# Patient Record
Sex: FEMALE | Race: WHITE | NOT HISPANIC OR LATINO | Employment: OTHER | URBAN - METROPOLITAN AREA
[De-identification: names, ages, dates, MRNs, and addresses within clinical notes are randomized per-mention and may not be internally consistent; named-entity substitution may affect disease eponyms.]

---

## 2021-01-01 ENCOUNTER — APPOINTMENT (OUTPATIENT)
Dept: GENERAL RADIOLOGY | Facility: CLINIC | Age: 76
DRG: 207 | End: 2021-01-01
Attending: INTERNAL MEDICINE
Payer: COMMERCIAL

## 2021-01-01 ENCOUNTER — APPOINTMENT (OUTPATIENT)
Dept: CT IMAGING | Facility: CLINIC | Age: 76
DRG: 207 | End: 2021-01-01
Attending: HOSPITALIST
Payer: COMMERCIAL

## 2021-01-01 ENCOUNTER — HOSPITAL ENCOUNTER (INPATIENT)
Facility: CLINIC | Age: 76
LOS: 20 days | DRG: 207 | End: 2021-12-28
Attending: EMERGENCY MEDICINE | Admitting: INTERNAL MEDICINE
Payer: COMMERCIAL

## 2021-01-01 ENCOUNTER — ANESTHESIA EVENT (OUTPATIENT)
Dept: INTENSIVE CARE | Facility: CLINIC | Age: 76
DRG: 207 | End: 2021-01-01
Payer: COMMERCIAL

## 2021-01-01 ENCOUNTER — ANESTHESIA (OUTPATIENT)
Dept: INTENSIVE CARE | Facility: CLINIC | Age: 76
DRG: 207 | End: 2021-01-01
Payer: COMMERCIAL

## 2021-01-01 ENCOUNTER — DOCUMENTATION ONLY (OUTPATIENT)
Dept: OTHER | Facility: CLINIC | Age: 76
End: 2021-01-01
Payer: MEDICARE

## 2021-01-01 ENCOUNTER — APPOINTMENT (OUTPATIENT)
Dept: CT IMAGING | Facility: CLINIC | Age: 76
DRG: 207 | End: 2021-01-01
Attending: EMERGENCY MEDICINE
Payer: COMMERCIAL

## 2021-01-01 VITALS
DIASTOLIC BLOOD PRESSURE: 61 MMHG | WEIGHT: 186.29 LBS | TEMPERATURE: 99.8 F | HEIGHT: 61 IN | BODY MASS INDEX: 35.17 KG/M2 | SYSTOLIC BLOOD PRESSURE: 131 MMHG | OXYGEN SATURATION: 91 %

## 2021-01-01 DIAGNOSIS — J12.82 PNEUMONIA DUE TO 2019 NOVEL CORONAVIRUS: ICD-10-CM

## 2021-01-01 DIAGNOSIS — U07.1 PNEUMONIA DUE TO 2019 NOVEL CORONAVIRUS: ICD-10-CM

## 2021-01-01 DIAGNOSIS — J96.01 ACUTE RESPIRATORY FAILURE WITH HYPOXIA (H): ICD-10-CM

## 2021-01-01 LAB
ABO/RH(D): NORMAL
ALBUMIN SERPL-MCNC: 1.4 G/DL (ref 3.4–5)
ALBUMIN SERPL-MCNC: 1.5 G/DL (ref 3.4–5)
ALBUMIN SERPL-MCNC: 1.5 G/DL (ref 3.4–5)
ALBUMIN SERPL-MCNC: 1.7 G/DL (ref 3.4–5)
ALBUMIN SERPL-MCNC: 2.2 G/DL (ref 3.4–5)
ALBUMIN SERPL-MCNC: 3 G/DL (ref 3.4–5)
ALBUMIN UR-MCNC: 70 MG/DL
ALBUMIN UR-MCNC: 70 MG/DL
ALP SERPL-CCNC: 50 U/L (ref 40–150)
ALP SERPL-CCNC: 50 U/L (ref 40–150)
ALP SERPL-CCNC: 53 U/L (ref 40–150)
ALP SERPL-CCNC: 55 U/L (ref 40–150)
ALP SERPL-CCNC: 58 U/L (ref 40–150)
ALP SERPL-CCNC: 63 U/L (ref 40–150)
ALT SERPL W P-5'-P-CCNC: 13 U/L (ref 0–50)
ALT SERPL W P-5'-P-CCNC: 15 U/L (ref 0–50)
ALT SERPL W P-5'-P-CCNC: 20 U/L (ref 0–50)
ALT SERPL W P-5'-P-CCNC: 21 U/L (ref 0–50)
ALT SERPL W P-5'-P-CCNC: 22 U/L (ref 0–50)
ALT SERPL W P-5'-P-CCNC: 24 U/L (ref 0–50)
ANION GAP SERPL CALCULATED.3IONS-SCNC: 11 MMOL/L (ref 3–14)
ANION GAP SERPL CALCULATED.3IONS-SCNC: 4 MMOL/L (ref 3–14)
ANION GAP SERPL CALCULATED.3IONS-SCNC: 4 MMOL/L (ref 3–14)
ANION GAP SERPL CALCULATED.3IONS-SCNC: 5 MMOL/L (ref 3–14)
ANION GAP SERPL CALCULATED.3IONS-SCNC: 6 MMOL/L (ref 3–14)
ANION GAP SERPL CALCULATED.3IONS-SCNC: 7 MMOL/L (ref 3–14)
ANION GAP SERPL CALCULATED.3IONS-SCNC: 8 MMOL/L (ref 3–14)
ANION GAP SERPL CALCULATED.3IONS-SCNC: 8 MMOL/L (ref 3–14)
APPEARANCE UR: CLEAR
APPEARANCE UR: CLEAR
APTT PPP: 32 SECONDS (ref 22–38)
AST SERPL W P-5'-P-CCNC: 19 U/L (ref 0–45)
AST SERPL W P-5'-P-CCNC: 20 U/L (ref 0–45)
AST SERPL W P-5'-P-CCNC: 28 U/L (ref 0–45)
AST SERPL W P-5'-P-CCNC: 31 U/L (ref 0–45)
AST SERPL W P-5'-P-CCNC: 43 U/L (ref 0–45)
AST SERPL W P-5'-P-CCNC: 59 U/L (ref 0–45)
ATRIAL RATE - MUSE: 75 BPM
ATRIAL RATE - MUSE: 87 BPM
ATRIAL RATE - MUSE: 87 BPM
BACTERIA #/AREA URNS HPF: ABNORMAL /HPF
BACTERIA #/AREA URNS HPF: ABNORMAL /HPF
BACTERIA BLD CULT: NO GROWTH
BACTERIA SPT CULT: ABNORMAL
BACTERIA UR CULT: ABNORMAL
BASE EXCESS BLDA CALC-SCNC: -1.7 MMOL/L (ref -9–1.8)
BASE EXCESS BLDA CALC-SCNC: -1.9 MMOL/L (ref -9–1.8)
BASE EXCESS BLDA CALC-SCNC: -2.1 MMOL/L (ref -9–1.8)
BASE EXCESS BLDA CALC-SCNC: -2.2 MMOL/L (ref -9–1.8)
BASE EXCESS BLDA CALC-SCNC: -2.9 MMOL/L (ref -9–1.8)
BASE EXCESS BLDA CALC-SCNC: 1 MMOL/L (ref -9–1.8)
BASE EXCESS BLDA CALC-SCNC: 1.3 MMOL/L (ref -9–1.8)
BASE EXCESS BLDA CALC-SCNC: 3.1 MMOL/L (ref -9–1.8)
BASE EXCESS BLDV CALC-SCNC: -1.1 MMOL/L (ref -7.7–1.9)
BASE EXCESS BLDV CALC-SCNC: -1.5 MMOL/L (ref -7.7–1.9)
BASE EXCESS BLDV CALC-SCNC: -2.3 MMOL/L (ref -7.7–1.9)
BASE EXCESS BLDV CALC-SCNC: -2.7 MMOL/L (ref -7.7–1.9)
BASE EXCESS BLDV CALC-SCNC: -4.4 MMOL/L (ref -7.7–1.9)
BASE EXCESS BLDV CALC-SCNC: -5.2 MMOL/L (ref -7.7–1.9)
BASE EXCESS BLDV CALC-SCNC: -5.2 MMOL/L (ref -7.7–1.9)
BASE EXCESS BLDV CALC-SCNC: -6.7 MMOL/L (ref -7.7–1.9)
BASE EXCESS BLDV CALC-SCNC: 2 MMOL/L (ref -7.7–1.9)
BASOPHILS # BLD AUTO: 0 10E3/UL (ref 0–0.2)
BASOPHILS # BLD MANUAL: 0 10E3/UL (ref 0–0.2)
BASOPHILS NFR BLD AUTO: 0 %
BASOPHILS NFR BLD MANUAL: 0 %
BILIRUB DIRECT SERPL-MCNC: <0.1 MG/DL (ref 0–0.2)
BILIRUB SERPL-MCNC: 0.2 MG/DL (ref 0.2–1.3)
BILIRUB SERPL-MCNC: 0.3 MG/DL (ref 0.2–1.3)
BILIRUB UR QL STRIP: NEGATIVE
BILIRUB UR QL STRIP: NEGATIVE
BUN SERPL-MCNC: 29 MG/DL (ref 7–30)
BUN SERPL-MCNC: 29 MG/DL (ref 7–30)
BUN SERPL-MCNC: 31 MG/DL (ref 7–30)
BUN SERPL-MCNC: 32 MG/DL (ref 7–30)
BUN SERPL-MCNC: 35 MG/DL (ref 7–30)
BUN SERPL-MCNC: 36 MG/DL (ref 7–30)
BUN SERPL-MCNC: 36 MG/DL (ref 7–30)
BUN SERPL-MCNC: 39 MG/DL (ref 7–30)
BUN SERPL-MCNC: 40 MG/DL (ref 7–30)
BUN SERPL-MCNC: 46 MG/DL (ref 7–30)
BUN SERPL-MCNC: 53 MG/DL (ref 7–30)
BUN SERPL-MCNC: 63 MG/DL (ref 7–30)
BUN SERPL-MCNC: 66 MG/DL (ref 7–30)
BUN SERPL-MCNC: 71 MG/DL (ref 7–30)
BUN SERPL-MCNC: 72 MG/DL (ref 7–30)
BUN SERPL-MCNC: 75 MG/DL (ref 7–30)
CALCIUM SERPL-MCNC: 7.8 MG/DL (ref 8.5–10.1)
CALCIUM SERPL-MCNC: 8 MG/DL (ref 8.5–10.1)
CALCIUM SERPL-MCNC: 8.1 MG/DL (ref 8.5–10.1)
CALCIUM SERPL-MCNC: 8.2 MG/DL (ref 8.5–10.1)
CALCIUM SERPL-MCNC: 8.3 MG/DL (ref 8.5–10.1)
CALCIUM SERPL-MCNC: 8.4 MG/DL (ref 8.5–10.1)
CALCIUM SERPL-MCNC: 8.5 MG/DL (ref 8.5–10.1)
CALCIUM SERPL-MCNC: 8.6 MG/DL (ref 8.5–10.1)
CHLORIDE BLD-SCNC: 101 MMOL/L (ref 94–109)
CHLORIDE BLD-SCNC: 107 MMOL/L (ref 94–109)
CHLORIDE BLD-SCNC: 107 MMOL/L (ref 94–109)
CHLORIDE BLD-SCNC: 108 MMOL/L (ref 94–109)
CHLORIDE BLD-SCNC: 108 MMOL/L (ref 94–109)
CHLORIDE BLD-SCNC: 109 MMOL/L (ref 94–109)
CHLORIDE BLD-SCNC: 109 MMOL/L (ref 94–109)
CHLORIDE BLD-SCNC: 110 MMOL/L (ref 94–109)
CHLORIDE BLD-SCNC: 111 MMOL/L (ref 94–109)
CHLORIDE BLD-SCNC: 112 MMOL/L (ref 94–109)
CK SERPL-CCNC: 12 U/L (ref 30–225)
CK SERPL-CCNC: 22 U/L (ref 30–225)
CK SERPL-CCNC: 44 U/L (ref 30–225)
CK SERPL-CCNC: 52 U/L (ref 30–225)
CK SERPL-CCNC: 537 U/L (ref 30–225)
CK SERPL-CCNC: 677 U/L (ref 30–225)
CO2 SERPL-SCNC: 19 MMOL/L (ref 20–32)
CO2 SERPL-SCNC: 20 MMOL/L (ref 20–32)
CO2 SERPL-SCNC: 20 MMOL/L (ref 20–32)
CO2 SERPL-SCNC: 21 MMOL/L (ref 20–32)
CO2 SERPL-SCNC: 22 MMOL/L (ref 20–32)
CO2 SERPL-SCNC: 22 MMOL/L (ref 20–32)
CO2 SERPL-SCNC: 23 MMOL/L (ref 20–32)
CO2 SERPL-SCNC: 24 MMOL/L (ref 20–32)
CO2 SERPL-SCNC: 24 MMOL/L (ref 20–32)
CO2 SERPL-SCNC: 25 MMOL/L (ref 20–32)
CO2 SERPL-SCNC: 25 MMOL/L (ref 20–32)
CO2 SERPL-SCNC: 26 MMOL/L (ref 20–32)
CO2 SERPL-SCNC: 27 MMOL/L (ref 20–32)
CO2 SERPL-SCNC: 27 MMOL/L (ref 20–32)
COLOR UR AUTO: YELLOW
COLOR UR AUTO: YELLOW
CREAT SERPL-MCNC: 0.79 MG/DL (ref 0.52–1.04)
CREAT SERPL-MCNC: 0.84 MG/DL (ref 0.52–1.04)
CREAT SERPL-MCNC: 0.86 MG/DL (ref 0.52–1.04)
CREAT SERPL-MCNC: 0.87 MG/DL (ref 0.52–1.04)
CREAT SERPL-MCNC: 0.92 MG/DL (ref 0.52–1.04)
CREAT SERPL-MCNC: 0.97 MG/DL (ref 0.52–1.04)
CREAT SERPL-MCNC: 0.97 MG/DL (ref 0.52–1.04)
CREAT SERPL-MCNC: 1.11 MG/DL (ref 0.52–1.04)
CREAT SERPL-MCNC: 1.12 MG/DL (ref 0.52–1.04)
CREAT SERPL-MCNC: 1.13 MG/DL (ref 0.52–1.04)
CREAT SERPL-MCNC: 1.16 MG/DL (ref 0.52–1.04)
CREAT SERPL-MCNC: 1.16 MG/DL (ref 0.52–1.04)
CREAT SERPL-MCNC: 1.21 MG/DL (ref 0.52–1.04)
CREAT SERPL-MCNC: 1.24 MG/DL (ref 0.52–1.04)
CREAT SERPL-MCNC: 1.28 MG/DL (ref 0.52–1.04)
CREAT SERPL-MCNC: 1.28 MG/DL (ref 0.52–1.04)
CREAT SERPL-MCNC: 1.33 MG/DL (ref 0.52–1.04)
CREAT SERPL-MCNC: 1.42 MG/DL (ref 0.52–1.04)
CREAT SERPL-MCNC: 1.54 MG/DL (ref 0.52–1.04)
CREAT SERPL-MCNC: 1.55 MG/DL (ref 0.52–1.04)
CREAT SERPL-MCNC: 1.67 MG/DL (ref 0.52–1.04)
CREAT UR-MCNC: 19 MG/DL
CRP SERPL-MCNC: 102 MG/L (ref 0–8)
CRP SERPL-MCNC: 120 MG/L (ref 0–8)
CRP SERPL-MCNC: 146 MG/L (ref 0–8)
CRP SERPL-MCNC: 148 MG/L (ref 0–8)
CRP SERPL-MCNC: 152 MG/L (ref 0–8)
CRP SERPL-MCNC: 154 MG/L (ref 0–8)
CRP SERPL-MCNC: 41.8 MG/L (ref 0–8)
CRP SERPL-MCNC: 72.1 MG/L (ref 0–8)
CRP SERPL-MCNC: 81 MG/L (ref 0–8)
CRP SERPL-MCNC: 91.3 MG/L (ref 0–8)
D DIMER PPP FEU-MCNC: 0.34 UG/ML FEU (ref 0–0.5)
D DIMER PPP FEU-MCNC: 0.38 UG/ML FEU (ref 0–0.5)
D DIMER PPP FEU-MCNC: 0.47 UG/ML FEU (ref 0–0.5)
D DIMER PPP FEU-MCNC: 0.75 UG/ML FEU (ref 0–0.5)
D DIMER PPP FEU-MCNC: 2.62 UG/ML FEU (ref 0–0.5)
D DIMER PPP FEU-MCNC: 2.69 UG/ML FEU (ref 0–0.5)
DIASTOLIC BLOOD PRESSURE - MUSE: NORMAL MMHG
EOSINOPHIL # BLD AUTO: 0 10E3/UL (ref 0–0.7)
EOSINOPHIL # BLD MANUAL: 0 10E3/UL (ref 0–0.7)
EOSINOPHIL NFR BLD AUTO: 0 %
EOSINOPHIL NFR BLD MANUAL: 0 %
ERYTHROCYTE [DISTWIDTH] IN BLOOD BY AUTOMATED COUNT: 12.7 % (ref 10–15)
ERYTHROCYTE [DISTWIDTH] IN BLOOD BY AUTOMATED COUNT: 12.9 % (ref 10–15)
ERYTHROCYTE [DISTWIDTH] IN BLOOD BY AUTOMATED COUNT: 13 % (ref 10–15)
ERYTHROCYTE [DISTWIDTH] IN BLOOD BY AUTOMATED COUNT: 13.1 % (ref 10–15)
ERYTHROCYTE [DISTWIDTH] IN BLOOD BY AUTOMATED COUNT: 13.1 % (ref 10–15)
ERYTHROCYTE [DISTWIDTH] IN BLOOD BY AUTOMATED COUNT: 13.2 % (ref 10–15)
ERYTHROCYTE [DISTWIDTH] IN BLOOD BY AUTOMATED COUNT: 13.2 % (ref 10–15)
ERYTHROCYTE [DISTWIDTH] IN BLOOD BY AUTOMATED COUNT: 13.3 % (ref 10–15)
ERYTHROCYTE [DISTWIDTH] IN BLOOD BY AUTOMATED COUNT: 13.5 % (ref 10–15)
ERYTHROCYTE [DISTWIDTH] IN BLOOD BY AUTOMATED COUNT: 13.6 % (ref 10–15)
ERYTHROCYTE [DISTWIDTH] IN BLOOD BY AUTOMATED COUNT: 13.6 % (ref 10–15)
ERYTHROCYTE [DISTWIDTH] IN BLOOD BY AUTOMATED COUNT: 14.1 % (ref 10–15)
ERYTHROCYTE [DISTWIDTH] IN BLOOD BY AUTOMATED COUNT: 14.3 % (ref 10–15)
ERYTHROCYTE [DISTWIDTH] IN BLOOD BY AUTOMATED COUNT: 14.6 % (ref 10–15)
FIBRINOGEN PPP-MCNC: 462 MG/DL (ref 170–490)
FIBRINOGEN PPP-MCNC: 506 MG/DL (ref 170–490)
FLUAV RNA SPEC QL NAA+PROBE: NEGATIVE
FLUBV RNA RESP QL NAA+PROBE: NEGATIVE
FRACT EXCRET NA UR+SERPL-RTO: 1.1 %
GFR SERPL CREATININE-BSD FRML MDRD: 31 ML/MIN/1.73M2
GFR SERPL CREATININE-BSD FRML MDRD: 33 ML/MIN/1.73M2
GFR SERPL CREATININE-BSD FRML MDRD: 34 ML/MIN/1.73M2
GFR SERPL CREATININE-BSD FRML MDRD: 38 ML/MIN/1.73M2
GFR SERPL CREATININE-BSD FRML MDRD: 39 ML/MIN/1.73M2
GFR SERPL CREATININE-BSD FRML MDRD: 41 ML/MIN/1.73M2
GFR SERPL CREATININE-BSD FRML MDRD: 42 ML/MIN/1.73M2
GFR SERPL CREATININE-BSD FRML MDRD: 43 ML/MIN/1.73M2
GFR SERPL CREATININE-BSD FRML MDRD: 44 ML/MIN/1.73M2
GFR SERPL CREATININE-BSD FRML MDRD: 46 ML/MIN/1.73M2
GFR SERPL CREATININE-BSD FRML MDRD: 48 ML/MIN/1.73M2
GFR SERPL CREATININE-BSD FRML MDRD: 48 ML/MIN/1.73M2
GFR SERPL CREATININE-BSD FRML MDRD: 49 ML/MIN/1.73M2
GFR SERPL CREATININE-BSD FRML MDRD: 50 ML/MIN/1.73M2
GFR SERPL CREATININE-BSD FRML MDRD: 57 ML/MIN/1.73M2
GFR SERPL CREATININE-BSD FRML MDRD: 60 ML/MIN/1.73M2
GFR SERPL CREATININE-BSD FRML MDRD: 61 ML/MIN/1.73M2
GFR SERPL CREATININE-BSD FRML MDRD: 65 ML/MIN/1.73M2
GFR SERPL CREATININE-BSD FRML MDRD: 66 ML/MIN/1.73M2
GFR SERPL CREATININE-BSD FRML MDRD: 68 ML/MIN/1.73M2
GFR SERPL CREATININE-BSD FRML MDRD: 73 ML/MIN/1.73M2
GLUCOSE BLD-MCNC: 102 MG/DL (ref 70–99)
GLUCOSE BLD-MCNC: 109 MG/DL (ref 70–99)
GLUCOSE BLD-MCNC: 110 MG/DL (ref 70–99)
GLUCOSE BLD-MCNC: 113 MG/DL (ref 70–99)
GLUCOSE BLD-MCNC: 120 MG/DL (ref 70–99)
GLUCOSE BLD-MCNC: 121 MG/DL (ref 70–99)
GLUCOSE BLD-MCNC: 125 MG/DL (ref 70–99)
GLUCOSE BLD-MCNC: 129 MG/DL (ref 70–99)
GLUCOSE BLD-MCNC: 129 MG/DL (ref 70–99)
GLUCOSE BLD-MCNC: 130 MG/DL (ref 70–99)
GLUCOSE BLD-MCNC: 131 MG/DL (ref 70–99)
GLUCOSE BLD-MCNC: 137 MG/DL (ref 70–99)
GLUCOSE BLD-MCNC: 164 MG/DL (ref 70–99)
GLUCOSE BLD-MCNC: 228 MG/DL (ref 70–99)
GLUCOSE BLD-MCNC: 94 MG/DL (ref 70–99)
GLUCOSE BLD-MCNC: 96 MG/DL (ref 70–99)
GLUCOSE BLDC GLUCOMTR-MCNC: 101 MG/DL (ref 70–99)
GLUCOSE BLDC GLUCOMTR-MCNC: 102 MG/DL (ref 70–99)
GLUCOSE BLDC GLUCOMTR-MCNC: 104 MG/DL (ref 70–99)
GLUCOSE BLDC GLUCOMTR-MCNC: 107 MG/DL (ref 70–99)
GLUCOSE BLDC GLUCOMTR-MCNC: 109 MG/DL (ref 70–99)
GLUCOSE BLDC GLUCOMTR-MCNC: 109 MG/DL (ref 70–99)
GLUCOSE BLDC GLUCOMTR-MCNC: 113 MG/DL (ref 70–99)
GLUCOSE BLDC GLUCOMTR-MCNC: 114 MG/DL (ref 70–99)
GLUCOSE BLDC GLUCOMTR-MCNC: 116 MG/DL (ref 70–99)
GLUCOSE BLDC GLUCOMTR-MCNC: 118 MG/DL (ref 70–99)
GLUCOSE BLDC GLUCOMTR-MCNC: 120 MG/DL (ref 70–99)
GLUCOSE BLDC GLUCOMTR-MCNC: 121 MG/DL (ref 70–99)
GLUCOSE BLDC GLUCOMTR-MCNC: 121 MG/DL (ref 70–99)
GLUCOSE BLDC GLUCOMTR-MCNC: 122 MG/DL (ref 70–99)
GLUCOSE BLDC GLUCOMTR-MCNC: 123 MG/DL (ref 70–99)
GLUCOSE BLDC GLUCOMTR-MCNC: 123 MG/DL (ref 70–99)
GLUCOSE BLDC GLUCOMTR-MCNC: 127 MG/DL (ref 70–99)
GLUCOSE BLDC GLUCOMTR-MCNC: 128 MG/DL (ref 70–99)
GLUCOSE BLDC GLUCOMTR-MCNC: 130 MG/DL (ref 70–99)
GLUCOSE BLDC GLUCOMTR-MCNC: 131 MG/DL (ref 70–99)
GLUCOSE BLDC GLUCOMTR-MCNC: 131 MG/DL (ref 70–99)
GLUCOSE BLDC GLUCOMTR-MCNC: 134 MG/DL (ref 70–99)
GLUCOSE BLDC GLUCOMTR-MCNC: 135 MG/DL (ref 70–99)
GLUCOSE BLDC GLUCOMTR-MCNC: 135 MG/DL (ref 70–99)
GLUCOSE BLDC GLUCOMTR-MCNC: 136 MG/DL (ref 70–99)
GLUCOSE BLDC GLUCOMTR-MCNC: 136 MG/DL (ref 70–99)
GLUCOSE BLDC GLUCOMTR-MCNC: 137 MG/DL (ref 70–99)
GLUCOSE BLDC GLUCOMTR-MCNC: 137 MG/DL (ref 70–99)
GLUCOSE BLDC GLUCOMTR-MCNC: 139 MG/DL (ref 70–99)
GLUCOSE BLDC GLUCOMTR-MCNC: 139 MG/DL (ref 70–99)
GLUCOSE BLDC GLUCOMTR-MCNC: 140 MG/DL (ref 70–99)
GLUCOSE BLDC GLUCOMTR-MCNC: 142 MG/DL (ref 70–99)
GLUCOSE BLDC GLUCOMTR-MCNC: 144 MG/DL (ref 70–99)
GLUCOSE BLDC GLUCOMTR-MCNC: 148 MG/DL (ref 70–99)
GLUCOSE BLDC GLUCOMTR-MCNC: 148 MG/DL (ref 70–99)
GLUCOSE BLDC GLUCOMTR-MCNC: 149 MG/DL (ref 70–99)
GLUCOSE BLDC GLUCOMTR-MCNC: 151 MG/DL (ref 70–99)
GLUCOSE BLDC GLUCOMTR-MCNC: 155 MG/DL (ref 70–99)
GLUCOSE BLDC GLUCOMTR-MCNC: 156 MG/DL (ref 70–99)
GLUCOSE BLDC GLUCOMTR-MCNC: 157 MG/DL (ref 70–99)
GLUCOSE BLDC GLUCOMTR-MCNC: 158 MG/DL (ref 70–99)
GLUCOSE BLDC GLUCOMTR-MCNC: 166 MG/DL (ref 70–99)
GLUCOSE BLDC GLUCOMTR-MCNC: 170 MG/DL (ref 70–99)
GLUCOSE BLDC GLUCOMTR-MCNC: 171 MG/DL (ref 70–99)
GLUCOSE BLDC GLUCOMTR-MCNC: 173 MG/DL (ref 70–99)
GLUCOSE BLDC GLUCOMTR-MCNC: 175 MG/DL (ref 70–99)
GLUCOSE BLDC GLUCOMTR-MCNC: 176 MG/DL (ref 70–99)
GLUCOSE BLDC GLUCOMTR-MCNC: 179 MG/DL (ref 70–99)
GLUCOSE BLDC GLUCOMTR-MCNC: 181 MG/DL (ref 70–99)
GLUCOSE BLDC GLUCOMTR-MCNC: 187 MG/DL (ref 70–99)
GLUCOSE BLDC GLUCOMTR-MCNC: 192 MG/DL (ref 70–99)
GLUCOSE BLDC GLUCOMTR-MCNC: 213 MG/DL (ref 70–99)
GLUCOSE BLDC GLUCOMTR-MCNC: 219 MG/DL (ref 70–99)
GLUCOSE BLDC GLUCOMTR-MCNC: 63 MG/DL (ref 70–99)
GLUCOSE BLDC GLUCOMTR-MCNC: 70 MG/DL (ref 70–99)
GLUCOSE BLDC GLUCOMTR-MCNC: 83 MG/DL (ref 70–99)
GLUCOSE BLDC GLUCOMTR-MCNC: 86 MG/DL (ref 70–99)
GLUCOSE BLDC GLUCOMTR-MCNC: 87 MG/DL (ref 70–99)
GLUCOSE BLDC GLUCOMTR-MCNC: 90 MG/DL (ref 70–99)
GLUCOSE BLDC GLUCOMTR-MCNC: 93 MG/DL (ref 70–99)
GLUCOSE BLDC GLUCOMTR-MCNC: 96 MG/DL (ref 70–99)
GLUCOSE BLDC GLUCOMTR-MCNC: 98 MG/DL (ref 70–99)
GLUCOSE BLDC GLUCOMTR-MCNC: 99 MG/DL (ref 70–99)
GLUCOSE BLDC GLUCOMTR-MCNC: 99 MG/DL (ref 70–99)
GLUCOSE UR STRIP-MCNC: NEGATIVE MG/DL
GLUCOSE UR STRIP-MCNC: NEGATIVE MG/DL
GRAM STAIN RESULT: ABNORMAL
GRAM STAIN RESULT: ABNORMAL
GRANULAR CAST: 4 /LPF
HBA1C MFR BLD: 5.6 % (ref 0–5.6)
HCO3 BLD-SCNC: 24 MMOL/L (ref 21–28)
HCO3 BLD-SCNC: 25 MMOL/L (ref 21–28)
HCO3 BLD-SCNC: 26 MMOL/L (ref 21–28)
HCO3 BLD-SCNC: 26 MMOL/L (ref 21–28)
HCO3 BLD-SCNC: 27 MMOL/L (ref 21–28)
HCO3 BLDV-SCNC: 21 MMOL/L (ref 21–28)
HCO3 BLDV-SCNC: 22 MMOL/L (ref 21–28)
HCO3 BLDV-SCNC: 24 MMOL/L (ref 21–28)
HCO3 BLDV-SCNC: 25 MMOL/L (ref 21–28)
HCO3 BLDV-SCNC: 28 MMOL/L (ref 21–28)
HCT VFR BLD AUTO: 28.2 % (ref 35–47)
HCT VFR BLD AUTO: 29.2 % (ref 35–47)
HCT VFR BLD AUTO: 29.5 % (ref 35–47)
HCT VFR BLD AUTO: 32.3 % (ref 35–47)
HCT VFR BLD AUTO: 32.9 % (ref 35–47)
HCT VFR BLD AUTO: 33.9 % (ref 35–47)
HCT VFR BLD AUTO: 34.8 % (ref 35–47)
HCT VFR BLD AUTO: 35.9 % (ref 35–47)
HCT VFR BLD AUTO: 36.1 % (ref 35–47)
HCT VFR BLD AUTO: 37.3 % (ref 35–47)
HCT VFR BLD AUTO: 37.3 % (ref 35–47)
HCT VFR BLD AUTO: 37.9 % (ref 35–47)
HCT VFR BLD AUTO: 38.9 % (ref 35–47)
HCT VFR BLD AUTO: 40.4 % (ref 35–47)
HCT VFR BLD AUTO: 40.8 % (ref 35–47)
HCT VFR BLD AUTO: 43.9 % (ref 35–47)
HGB BLD-MCNC: 10.2 G/DL (ref 11.7–15.7)
HGB BLD-MCNC: 10.8 G/DL (ref 11.7–15.7)
HGB BLD-MCNC: 11.2 G/DL (ref 11.7–15.7)
HGB BLD-MCNC: 11.7 G/DL (ref 11.7–15.7)
HGB BLD-MCNC: 12 G/DL (ref 11.7–15.7)
HGB BLD-MCNC: 12.2 G/DL (ref 11.7–15.7)
HGB BLD-MCNC: 12.2 G/DL (ref 11.7–15.7)
HGB BLD-MCNC: 12.7 G/DL (ref 11.7–15.7)
HGB BLD-MCNC: 13.1 G/DL (ref 11.7–15.7)
HGB BLD-MCNC: 13.1 G/DL (ref 11.7–15.7)
HGB BLD-MCNC: 14.5 G/DL (ref 11.7–15.7)
HGB BLD-MCNC: 8.6 G/DL (ref 11.7–15.7)
HGB BLD-MCNC: 8.7 G/DL (ref 11.7–15.7)
HGB BLD-MCNC: 9.1 G/DL (ref 11.7–15.7)
HGB BLD-MCNC: 9.6 G/DL (ref 11.7–15.7)
HGB BLD-MCNC: 9.8 G/DL (ref 11.7–15.7)
HGB UR QL STRIP: ABNORMAL
HGB UR QL STRIP: ABNORMAL
IMM GRANULOCYTES # BLD: 0 10E3/UL
IMM GRANULOCYTES # BLD: 0.1 10E3/UL
IMM GRANULOCYTES NFR BLD: 0 %
IMM GRANULOCYTES NFR BLD: 1 %
INR PPP: 1.05 (ref 0.85–1.15)
INR PPP: 1.22 (ref 0.85–1.15)
INTERPRETATION ECG - MUSE: NORMAL
KETONES UR STRIP-MCNC: 10 MG/DL
KETONES UR STRIP-MCNC: NEGATIVE MG/DL
LACTATE SERPL-SCNC: 1.7 MMOL/L (ref 0.7–2)
LEUKOCYTE ESTERASE UR QL STRIP: ABNORMAL
LEUKOCYTE ESTERASE UR QL STRIP: NEGATIVE
LIPASE SERPL-CCNC: 322 U/L (ref 73–393)
LYMPHOCYTES # BLD AUTO: 0.3 10E3/UL (ref 0.8–5.3)
LYMPHOCYTES # BLD AUTO: 0.4 10E3/UL (ref 0.8–5.3)
LYMPHOCYTES # BLD AUTO: 0.4 10E3/UL (ref 0.8–5.3)
LYMPHOCYTES # BLD AUTO: 0.5 10E3/UL (ref 0.8–5.3)
LYMPHOCYTES # BLD MANUAL: 0.4 10E3/UL (ref 0.8–5.3)
LYMPHOCYTES NFR BLD AUTO: 3 %
LYMPHOCYTES NFR BLD AUTO: 4 %
LYMPHOCYTES NFR BLD AUTO: 4 %
LYMPHOCYTES NFR BLD AUTO: 8 %
LYMPHOCYTES NFR BLD MANUAL: 3 %
MAGNESIUM SERPL-MCNC: 2.2 MG/DL (ref 1.6–2.3)
MAGNESIUM SERPL-MCNC: 2.3 MG/DL (ref 1.6–2.3)
MAGNESIUM SERPL-MCNC: 2.3 MG/DL (ref 1.6–2.3)
MAGNESIUM SERPL-MCNC: 2.4 MG/DL (ref 1.6–2.3)
MAGNESIUM SERPL-MCNC: 2.4 MG/DL (ref 1.6–2.3)
MAGNESIUM SERPL-MCNC: 2.5 MG/DL (ref 1.6–2.3)
MAGNESIUM SERPL-MCNC: 2.5 MG/DL (ref 1.6–2.3)
MAGNESIUM SERPL-MCNC: 2.6 MG/DL (ref 1.6–2.3)
MAGNESIUM SERPL-MCNC: 2.6 MG/DL (ref 1.6–2.3)
MAGNESIUM SERPL-MCNC: 2.8 MG/DL (ref 1.6–2.3)
MCH RBC QN AUTO: 30.2 PG (ref 26.5–33)
MCH RBC QN AUTO: 30.4 PG (ref 26.5–33)
MCH RBC QN AUTO: 30.4 PG (ref 26.5–33)
MCH RBC QN AUTO: 30.5 PG (ref 26.5–33)
MCH RBC QN AUTO: 30.6 PG (ref 26.5–33)
MCH RBC QN AUTO: 30.8 PG (ref 26.5–33)
MCH RBC QN AUTO: 30.8 PG (ref 26.5–33)
MCH RBC QN AUTO: 31 PG (ref 26.5–33)
MCH RBC QN AUTO: 31 PG (ref 26.5–33)
MCH RBC QN AUTO: 31.1 PG (ref 26.5–33)
MCH RBC QN AUTO: 31.3 PG (ref 26.5–33)
MCH RBC QN AUTO: 31.3 PG (ref 26.5–33)
MCH RBC QN AUTO: 31.4 PG (ref 26.5–33)
MCH RBC QN AUTO: 31.6 PG (ref 26.5–33)
MCHC RBC AUTO-ENTMCNC: 29.3 G/DL (ref 31.5–36.5)
MCHC RBC AUTO-ENTMCNC: 29.5 G/DL (ref 31.5–36.5)
MCHC RBC AUTO-ENTMCNC: 29.7 G/DL (ref 31.5–36.5)
MCHC RBC AUTO-ENTMCNC: 29.8 G/DL (ref 31.5–36.5)
MCHC RBC AUTO-ENTMCNC: 30.5 G/DL (ref 31.5–36.5)
MCHC RBC AUTO-ENTMCNC: 31 G/DL (ref 31.5–36.5)
MCHC RBC AUTO-ENTMCNC: 31.2 G/DL (ref 31.5–36.5)
MCHC RBC AUTO-ENTMCNC: 31.4 G/DL (ref 31.5–36.5)
MCHC RBC AUTO-ENTMCNC: 31.7 G/DL (ref 31.5–36.5)
MCHC RBC AUTO-ENTMCNC: 31.9 G/DL (ref 31.5–36.5)
MCHC RBC AUTO-ENTMCNC: 32.1 G/DL (ref 31.5–36.5)
MCHC RBC AUTO-ENTMCNC: 32.6 G/DL (ref 31.5–36.5)
MCHC RBC AUTO-ENTMCNC: 32.7 G/DL (ref 31.5–36.5)
MCHC RBC AUTO-ENTMCNC: 32.7 G/DL (ref 31.5–36.5)
MCHC RBC AUTO-ENTMCNC: 33 G/DL (ref 31.5–36.5)
MCHC RBC AUTO-ENTMCNC: 33.7 G/DL (ref 31.5–36.5)
MCV RBC AUTO: 100 FL (ref 78–100)
MCV RBC AUTO: 100 FL (ref 78–100)
MCV RBC AUTO: 102 FL (ref 78–100)
MCV RBC AUTO: 103 FL (ref 78–100)
MCV RBC AUTO: 105 FL (ref 78–100)
MCV RBC AUTO: 94 FL (ref 78–100)
MCV RBC AUTO: 94 FL (ref 78–100)
MCV RBC AUTO: 95 FL (ref 78–100)
MCV RBC AUTO: 95 FL (ref 78–100)
MCV RBC AUTO: 96 FL (ref 78–100)
MCV RBC AUTO: 96 FL (ref 78–100)
MCV RBC AUTO: 97 FL (ref 78–100)
MCV RBC AUTO: 98 FL (ref 78–100)
MCV RBC AUTO: 99 FL (ref 78–100)
MONOCYTES # BLD AUTO: 0.1 10E3/UL (ref 0–1.3)
MONOCYTES # BLD AUTO: 0.2 10E3/UL (ref 0–1.3)
MONOCYTES # BLD AUTO: 0.3 10E3/UL (ref 0–1.3)
MONOCYTES # BLD AUTO: 0.3 10E3/UL (ref 0–1.3)
MONOCYTES # BLD MANUAL: 0.1 10E3/UL (ref 0–1.3)
MONOCYTES NFR BLD AUTO: 1 %
MONOCYTES NFR BLD AUTO: 1 %
MONOCYTES NFR BLD AUTO: 3 %
MONOCYTES NFR BLD AUTO: 5 %
MONOCYTES NFR BLD MANUAL: 1 %
MUCOUS THREADS #/AREA URNS LPF: PRESENT /LPF
MUCOUS THREADS #/AREA URNS LPF: PRESENT /LPF
NEUTROPHILS # BLD AUTO: 10 10E3/UL (ref 1.6–8.3)
NEUTROPHILS # BLD AUTO: 14.4 10E3/UL (ref 1.6–8.3)
NEUTROPHILS # BLD AUTO: 4.7 10E3/UL (ref 1.6–8.3)
NEUTROPHILS # BLD AUTO: 7.4 10E3/UL (ref 1.6–8.3)
NEUTROPHILS # BLD MANUAL: 12.9 10E3/UL (ref 1.6–8.3)
NEUTROPHILS NFR BLD AUTO: 87 %
NEUTROPHILS NFR BLD AUTO: 92 %
NEUTROPHILS NFR BLD AUTO: 94 %
NEUTROPHILS NFR BLD AUTO: 95 %
NEUTROPHILS NFR BLD MANUAL: 96 %
NITRATE UR QL: NEGATIVE
NITRATE UR QL: NEGATIVE
NRBC # BLD AUTO: 0 10E3/UL
NRBC BLD AUTO-RTO: 0 /100
O2/TOTAL GAS SETTING VFR VENT: 100 %
O2/TOTAL GAS SETTING VFR VENT: 70 %
O2/TOTAL GAS SETTING VFR VENT: 75 %
O2/TOTAL GAS SETTING VFR VENT: 80 %
O2/TOTAL GAS SETTING VFR VENT: 80 %
O2/TOTAL GAS SETTING VFR VENT: 85 %
O2/TOTAL GAS SETTING VFR VENT: 90 %
O2/TOTAL GAS SETTING VFR VENT: 95 %
OXYHGB MFR BLD: 88 % (ref 92–100)
OXYHGB MFR BLD: 92 % (ref 92–100)
OXYHGB MFR BLD: 93 % (ref 92–100)
OXYHGB MFR BLD: 94 % (ref 92–100)
OXYHGB MFR BLD: 97 % (ref 92–100)
OXYHGB MFR BLD: 98 % (ref 92–100)
OXYHGB MFR BLDV: 70 % (ref 70–75)
OXYHGB MFR BLDV: 76 % (ref 70–75)
OXYHGB MFR BLDV: 80 % (ref 70–75)
OXYHGB MFR BLDV: 85 % (ref 70–75)
OXYHGB MFR BLDV: 86 % (ref 70–75)
OXYHGB MFR BLDV: 87 % (ref 70–75)
OXYHGB MFR BLDV: 88 % (ref 70–75)
OXYHGB MFR BLDV: 88 % (ref 70–75)
P AXIS - MUSE: 4 DEGREES
P AXIS - MUSE: 55 DEGREES
P AXIS - MUSE: 57 DEGREES
PCO2 BLD: 37 MM HG (ref 35–45)
PCO2 BLD: 39 MM HG (ref 35–45)
PCO2 BLD: 40 MM HG (ref 35–45)
PCO2 BLD: 45 MM HG (ref 35–45)
PCO2 BLD: 47 MM HG (ref 35–45)
PCO2 BLD: 48 MM HG (ref 35–45)
PCO2 BLD: 49 MM HG (ref 35–45)
PCO2 BLD: 49 MM HG (ref 35–45)
PCO2 BLDV: 48 MM HG (ref 40–50)
PCO2 BLDV: 50 MM HG (ref 40–50)
PCO2 BLDV: 51 MM HG (ref 40–50)
PCO2 BLDV: 52 MM HG (ref 40–50)
PCO2 BLDV: 59 MM HG (ref 40–50)
PCO2 BLDV: 64 MM HG (ref 40–50)
PH BLD: 7.3 [PH] (ref 7.35–7.45)
PH BLD: 7.31 [PH] (ref 7.35–7.45)
PH BLD: 7.32 [PH] (ref 7.35–7.45)
PH BLD: 7.32 [PH] (ref 7.35–7.45)
PH BLD: 7.34 [PH] (ref 7.35–7.45)
PH BLD: 7.42 [PH] (ref 7.35–7.45)
PH BLD: 7.42 [PH] (ref 7.35–7.45)
PH BLD: 7.47 [PH] (ref 7.35–7.45)
PH BLDV: 7.18 [PH] (ref 7.32–7.43)
PH BLDV: 7.21 [PH] (ref 7.32–7.43)
PH BLDV: 7.22 [PH] (ref 7.32–7.43)
PH BLDV: 7.25 [PH] (ref 7.32–7.43)
PH BLDV: 7.28 [PH] (ref 7.32–7.43)
PH BLDV: 7.28 [PH] (ref 7.32–7.43)
PH BLDV: 7.3 [PH] (ref 7.32–7.43)
PH BLDV: 7.33 [PH] (ref 7.32–7.43)
PH BLDV: 7.34 [PH] (ref 7.32–7.43)
PH UR STRIP: 6 [PH] (ref 5–7)
PH UR STRIP: 6 [PH] (ref 5–7)
PHOSPHATE SERPL-MCNC: 2.5 MG/DL (ref 2.5–4.5)
PHOSPHATE SERPL-MCNC: 2.6 MG/DL (ref 2.5–4.5)
PHOSPHATE SERPL-MCNC: 2.8 MG/DL (ref 2.5–4.5)
PHOSPHATE SERPL-MCNC: 3.2 MG/DL (ref 2.5–4.5)
PHOSPHATE SERPL-MCNC: 3.4 MG/DL (ref 2.5–4.5)
PHOSPHATE SERPL-MCNC: 3.7 MG/DL (ref 2.5–4.5)
PHOSPHATE SERPL-MCNC: 3.8 MG/DL (ref 2.5–4.5)
PHOSPHATE SERPL-MCNC: 3.9 MG/DL (ref 2.5–4.5)
PHOSPHATE SERPL-MCNC: 4 MG/DL (ref 2.5–4.5)
PHOSPHATE SERPL-MCNC: 4.3 MG/DL (ref 2.5–4.5)
PLAT MORPH BLD: ABNORMAL
PLAT MORPH BLD: NORMAL
PLATELET # BLD AUTO: 137 10E3/UL (ref 150–450)
PLATELET # BLD AUTO: 145 10E3/UL (ref 150–450)
PLATELET # BLD AUTO: 146 10E3/UL (ref 150–450)
PLATELET # BLD AUTO: 156 10E3/UL (ref 150–450)
PLATELET # BLD AUTO: 166 10E3/UL (ref 150–450)
PLATELET # BLD AUTO: 166 10E3/UL (ref 150–450)
PLATELET # BLD AUTO: 175 10E3/UL (ref 150–450)
PLATELET # BLD AUTO: 177 10E3/UL (ref 150–450)
PLATELET # BLD AUTO: 178 10E3/UL (ref 150–450)
PLATELET # BLD AUTO: 183 10E3/UL (ref 150–450)
PLATELET # BLD AUTO: 190 10E3/UL (ref 150–450)
PLATELET # BLD AUTO: 213 10E3/UL (ref 150–450)
PLATELET # BLD AUTO: 223 10E3/UL (ref 150–450)
PLATELET # BLD AUTO: 278 10E3/UL (ref 150–450)
PLATELET # BLD AUTO: 284 10E3/UL (ref 150–450)
PLATELET # BLD AUTO: 295 10E3/UL (ref 150–450)
PO2 BLD: 122 MM HG (ref 80–105)
PO2 BLD: 58 MM HG (ref 80–105)
PO2 BLD: 65 MM HG (ref 80–105)
PO2 BLD: 66 MM HG (ref 80–105)
PO2 BLD: 72 MM HG (ref 80–105)
PO2 BLD: 77 MM HG (ref 80–105)
PO2 BLD: 81 MM HG (ref 80–105)
PO2 BLD: 97 MM HG (ref 80–105)
PO2 BLDV: 21 MM HG (ref 25–47)
PO2 BLDV: 40 MM HG (ref 25–47)
PO2 BLDV: 42 MM HG (ref 25–47)
PO2 BLDV: 45 MM HG (ref 25–47)
PO2 BLDV: 54 MM HG (ref 25–47)
PO2 BLDV: 56 MM HG (ref 25–47)
PO2 BLDV: 57 MM HG (ref 25–47)
POTASSIUM BLD-SCNC: 4 MMOL/L (ref 3.4–5.3)
POTASSIUM BLD-SCNC: 4 MMOL/L (ref 3.4–5.3)
POTASSIUM BLD-SCNC: 4.1 MMOL/L (ref 3.4–5.3)
POTASSIUM BLD-SCNC: 4.3 MMOL/L (ref 3.4–5.3)
POTASSIUM BLD-SCNC: 4.3 MMOL/L (ref 3.4–5.3)
POTASSIUM BLD-SCNC: 4.4 MMOL/L (ref 3.4–5.3)
POTASSIUM BLD-SCNC: 4.5 MMOL/L (ref 3.4–5.3)
POTASSIUM BLD-SCNC: 4.6 MMOL/L (ref 3.4–5.3)
POTASSIUM BLD-SCNC: 4.6 MMOL/L (ref 3.4–5.3)
POTASSIUM BLD-SCNC: 4.7 MMOL/L (ref 3.4–5.3)
POTASSIUM BLD-SCNC: 4.8 MMOL/L (ref 3.4–5.3)
POTASSIUM BLD-SCNC: 4.9 MMOL/L (ref 3.4–5.3)
POTASSIUM BLD-SCNC: 5 MMOL/L (ref 3.4–5.3)
POTASSIUM BLD-SCNC: 5.1 MMOL/L (ref 3.4–5.3)
PR INTERVAL - MUSE: 136 MS
PR INTERVAL - MUSE: 136 MS
PR INTERVAL - MUSE: 92 MS
PREALB SERPL IA-MCNC: 7 MG/DL (ref 15–45)
PROCALCITONIN SERPL-MCNC: 0.17 NG/ML
PROCALCITONIN SERPL-MCNC: <0.05 NG/ML
PROT SERPL-MCNC: 5.6 G/DL (ref 6.8–8.8)
PROT SERPL-MCNC: 5.8 G/DL (ref 6.8–8.8)
PROT SERPL-MCNC: 6 G/DL (ref 6.8–8.8)
PROT SERPL-MCNC: 6 G/DL (ref 6.8–8.8)
PROT SERPL-MCNC: 6.1 G/DL (ref 6.8–8.8)
PROT SERPL-MCNC: 8.1 G/DL (ref 6.8–8.8)
QRS DURATION - MUSE: 82 MS
QRS DURATION - MUSE: 84 MS
QRS DURATION - MUSE: 98 MS
QT - MUSE: 354 MS
QT - MUSE: 362 MS
QT - MUSE: 372 MS
QTC - MUSE: 404 MS
QTC - MUSE: 425 MS
QTC - MUSE: 447 MS
R AXIS - MUSE: -8 DEGREES
R AXIS - MUSE: 30 DEGREES
R AXIS - MUSE: 46 DEGREES
RBC # BLD AUTO: 2.77 10E6/UL (ref 3.8–5.2)
RBC # BLD AUTO: 2.86 10E6/UL (ref 3.8–5.2)
RBC # BLD AUTO: 2.93 10E6/UL (ref 3.8–5.2)
RBC # BLD AUTO: 3.16 10E6/UL (ref 3.8–5.2)
RBC # BLD AUTO: 3.24 10E6/UL (ref 3.8–5.2)
RBC # BLD AUTO: 3.31 10E6/UL (ref 3.8–5.2)
RBC # BLD AUTO: 3.44 10E6/UL (ref 3.8–5.2)
RBC # BLD AUTO: 3.6 10E6/UL (ref 3.8–5.2)
RBC # BLD AUTO: 3.82 10E6/UL (ref 3.8–5.2)
RBC # BLD AUTO: 3.9 10E6/UL (ref 3.8–5.2)
RBC # BLD AUTO: 3.93 10E6/UL (ref 3.8–5.2)
RBC # BLD AUTO: 3.93 10E6/UL (ref 3.8–5.2)
RBC # BLD AUTO: 4.12 10E6/UL (ref 3.8–5.2)
RBC # BLD AUTO: 4.15 10E6/UL (ref 3.8–5.2)
RBC # BLD AUTO: 4.21 10E6/UL (ref 3.8–5.2)
RBC # BLD AUTO: 4.64 10E6/UL (ref 3.8–5.2)
RBC MORPH BLD: ABNORMAL
RBC MORPH BLD: NORMAL
RBC URINE: 4 /HPF
RBC URINE: 4 /HPF
SARS-COV-2 RNA RESP QL NAA+PROBE: POSITIVE
SODIUM SERPL-SCNC: 134 MMOL/L (ref 133–144)
SODIUM SERPL-SCNC: 135 MMOL/L (ref 133–144)
SODIUM SERPL-SCNC: 135 MMOL/L (ref 133–144)
SODIUM SERPL-SCNC: 137 MMOL/L (ref 133–144)
SODIUM SERPL-SCNC: 138 MMOL/L (ref 133–144)
SODIUM SERPL-SCNC: 138 MMOL/L (ref 133–144)
SODIUM SERPL-SCNC: 139 MMOL/L (ref 133–144)
SODIUM SERPL-SCNC: 139 MMOL/L (ref 133–144)
SODIUM SERPL-SCNC: 140 MMOL/L (ref 133–144)
SODIUM SERPL-SCNC: 141 MMOL/L (ref 133–144)
SODIUM SERPL-SCNC: 142 MMOL/L (ref 133–144)
SODIUM SERPL-SCNC: 142 MMOL/L (ref 133–144)
SODIUM UR-SCNC: 17 MMOL/L
SP GR UR STRIP: 1.01 (ref 1–1.03)
SP GR UR STRIP: 1.03 (ref 1–1.03)
SPECIMEN EXPIRATION DATE: NORMAL
SQUAMOUS EPITHELIAL: <1 /HPF
SQUAMOUS EPITHELIAL: <1 /HPF
SYSTOLIC BLOOD PRESSURE - MUSE: NORMAL MMHG
T AXIS - MUSE: 170 DEGREES
T AXIS - MUSE: 66 DEGREES
T AXIS - MUSE: 89 DEGREES
TRIGL SERPL-MCNC: 105 MG/DL
TRIGL SERPL-MCNC: 128 MG/DL
TRIGL SERPL-MCNC: 160 MG/DL
TRIGL SERPL-MCNC: 168 MG/DL
TRIGL SERPL-MCNC: 476 MG/DL
TROPONIN I SERPL HS-MCNC: 16 NG/L
TROPONIN I SERPL HS-MCNC: 16 NG/L
TROPONIN I SERPL HS-MCNC: 18 NG/L
TROPONIN I SERPL HS-MCNC: 18 NG/L
TROPONIN I SERPL HS-MCNC: 22 NG/L
TSH SERPL DL<=0.005 MIU/L-ACNC: 0.88 MU/L (ref 0.4–4)
UROBILINOGEN UR STRIP-MCNC: NORMAL MG/DL
UROBILINOGEN UR STRIP-MCNC: NORMAL MG/DL
VENTRICULAR RATE- MUSE: 75 BPM
VENTRICULAR RATE- MUSE: 87 BPM
VENTRICULAR RATE- MUSE: 87 BPM
WBC # BLD AUTO: 10.9 10E3/UL (ref 4–11)
WBC # BLD AUTO: 13.4 10E3/UL (ref 4–11)
WBC # BLD AUTO: 13.7 10E3/UL (ref 4–11)
WBC # BLD AUTO: 14.8 10E3/UL (ref 4–11)
WBC # BLD AUTO: 15 10E3/UL (ref 4–11)
WBC # BLD AUTO: 15.6 10E3/UL (ref 4–11)
WBC # BLD AUTO: 4 10E3/UL (ref 4–11)
WBC # BLD AUTO: 4 10E3/UL (ref 4–11)
WBC # BLD AUTO: 4.9 10E3/UL (ref 4–11)
WBC # BLD AUTO: 5.4 10E3/UL (ref 4–11)
WBC # BLD AUTO: 5.5 10E3/UL (ref 4–11)
WBC # BLD AUTO: 7.9 10E3/UL (ref 4–11)
WBC # BLD AUTO: 9 10E3/UL (ref 4–11)
WBC # BLD AUTO: 9.3 10E3/UL (ref 4–11)
WBC # BLD AUTO: 9.8 10E3/UL (ref 4–11)
WBC # BLD AUTO: 9.8 10E3/UL (ref 4–11)
WBC URINE: 2 /HPF
WBC URINE: 6 /HPF

## 2021-01-01 PROCEDURE — 120N000004 HC R&B MS OVERFLOW

## 2021-01-01 PROCEDURE — 86140 C-REACTIVE PROTEIN: CPT | Performed by: INTERNAL MEDICINE

## 2021-01-01 PROCEDURE — 999N000157 HC STATISTIC RCP TIME EA 10 MIN

## 2021-01-01 PROCEDURE — 94645 CONT INHLJ TX EACH ADDL HOUR: CPT

## 2021-01-01 PROCEDURE — 999N000105 HC STATISTIC NO DOCUMENTATION TO SUPPORT CHARGE

## 2021-01-01 PROCEDURE — 200N000001 HC R&B ICU

## 2021-01-01 PROCEDURE — 85027 COMPLETE CBC AUTOMATED: CPT | Performed by: INTERNAL MEDICINE

## 2021-01-01 PROCEDURE — 370N000003 HC ANESTHESIA WARD SERVICE

## 2021-01-01 PROCEDURE — 250N000011 HC RX IP 250 OP 636: Performed by: INTERNAL MEDICINE

## 2021-01-01 PROCEDURE — 36600 WITHDRAWAL OF ARTERIAL BLOOD: CPT

## 2021-01-01 PROCEDURE — 94003 VENT MGMT INPAT SUBQ DAY: CPT

## 2021-01-01 PROCEDURE — 82805 BLOOD GASES W/O2 SATURATION: CPT | Performed by: STUDENT IN AN ORGANIZED HEALTH CARE EDUCATION/TRAINING PROGRAM

## 2021-01-01 PROCEDURE — 87636 SARSCOV2 & INF A&B AMP PRB: CPT | Performed by: EMERGENCY MEDICINE

## 2021-01-01 PROCEDURE — 82550 ASSAY OF CK (CPK): CPT | Performed by: EMERGENCY MEDICINE

## 2021-01-01 PROCEDURE — 250N000013 HC RX MED GY IP 250 OP 250 PS 637: Performed by: INTERNAL MEDICINE

## 2021-01-01 PROCEDURE — 84100 ASSAY OF PHOSPHORUS: CPT | Performed by: INTERNAL MEDICINE

## 2021-01-01 PROCEDURE — 94640 AIRWAY INHALATION TREATMENT: CPT | Mod: 76

## 2021-01-01 PROCEDURE — 99223 1ST HOSP IP/OBS HIGH 75: CPT | Mod: AI | Performed by: INTERNAL MEDICINE

## 2021-01-01 PROCEDURE — 250N000009 HC RX 250: Performed by: EMERGENCY MEDICINE

## 2021-01-01 PROCEDURE — 82248 BILIRUBIN DIRECT: CPT | Performed by: INTERNAL MEDICINE

## 2021-01-01 PROCEDURE — C9113 INJ PANTOPRAZOLE SODIUM, VIA: HCPCS | Performed by: INTERNAL MEDICINE

## 2021-01-01 PROCEDURE — 99233 SBSQ HOSP IP/OBS HIGH 50: CPT | Performed by: INTERNAL MEDICINE

## 2021-01-01 PROCEDURE — 94002 VENT MGMT INPAT INIT DAY: CPT

## 2021-01-01 PROCEDURE — 250N000011 HC RX IP 250 OP 636: Performed by: HOSPITALIST

## 2021-01-01 PROCEDURE — 82310 ASSAY OF CALCIUM: CPT | Performed by: INTERNAL MEDICINE

## 2021-01-01 PROCEDURE — 82565 ASSAY OF CREATININE: CPT | Performed by: INTERNAL MEDICINE

## 2021-01-01 PROCEDURE — 83735 ASSAY OF MAGNESIUM: CPT | Performed by: INTERNAL MEDICINE

## 2021-01-01 PROCEDURE — XW033E5 INTRODUCTION OF REMDESIVIR ANTI-INFECTIVE INTO PERIPHERAL VEIN, PERCUTANEOUS APPROACH, NEW TECHNOLOGY GROUP 5: ICD-10-PCS | Performed by: INTERNAL MEDICINE

## 2021-01-01 PROCEDURE — 85384 FIBRINOGEN ACTIVITY: CPT | Performed by: INTERNAL MEDICINE

## 2021-01-01 PROCEDURE — 85610 PROTHROMBIN TIME: CPT | Performed by: INTERNAL MEDICINE

## 2021-01-01 PROCEDURE — 258N000003 HC RX IP 258 OP 636: Performed by: HOSPITALIST

## 2021-01-01 PROCEDURE — 80048 BASIC METABOLIC PNL TOTAL CA: CPT | Performed by: INTERNAL MEDICINE

## 2021-01-01 PROCEDURE — 272N000083 HC NUTRITION PRODUCT SEMIELEM INTERMED LITER

## 2021-01-01 PROCEDURE — 99231 SBSQ HOSP IP/OBS SF/LOW 25: CPT | Performed by: NURSE PRACTITIONER

## 2021-01-01 PROCEDURE — 250N000009 HC RX 250: Performed by: INTERNAL MEDICINE

## 2021-01-01 PROCEDURE — 99233 SBSQ HOSP IP/OBS HIGH 50: CPT | Performed by: HOSPITALIST

## 2021-01-01 PROCEDURE — 94660 CPAP INITIATION&MGMT: CPT

## 2021-01-01 PROCEDURE — 82550 ASSAY OF CK (CPK): CPT | Performed by: INTERNAL MEDICINE

## 2021-01-01 PROCEDURE — 82805 BLOOD GASES W/O2 SATURATION: CPT | Performed by: PHYSICIAN ASSISTANT

## 2021-01-01 PROCEDURE — 71045 X-RAY EXAM CHEST 1 VIEW: CPT

## 2021-01-01 PROCEDURE — 85379 FIBRIN DEGRADATION QUANT: CPT | Performed by: INTERNAL MEDICINE

## 2021-01-01 PROCEDURE — 84132 ASSAY OF SERUM POTASSIUM: CPT | Performed by: INTERNAL MEDICINE

## 2021-01-01 PROCEDURE — 84484 ASSAY OF TROPONIN QUANT: CPT | Performed by: EMERGENCY MEDICINE

## 2021-01-01 PROCEDURE — 36415 COLL VENOUS BLD VENIPUNCTURE: CPT | Performed by: INTERNAL MEDICINE

## 2021-01-01 PROCEDURE — 82310 ASSAY OF CALCIUM: CPT | Performed by: HOSPITALIST

## 2021-01-01 PROCEDURE — 85025 COMPLETE CBC W/AUTO DIFF WBC: CPT | Performed by: EMERGENCY MEDICINE

## 2021-01-01 PROCEDURE — 85379 FIBRIN DEGRADATION QUANT: CPT | Performed by: HOSPITALIST

## 2021-01-01 PROCEDURE — 94640 AIRWAY INHALATION TREATMENT: CPT

## 2021-01-01 PROCEDURE — 96376 TX/PRO/DX INJ SAME DRUG ADON: CPT

## 2021-01-01 PROCEDURE — 250N000013 HC RX MED GY IP 250 OP 250 PS 637: Performed by: HOSPITALIST

## 2021-01-01 PROCEDURE — 80048 BASIC METABOLIC PNL TOTAL CA: CPT | Performed by: HOSPITALIST

## 2021-01-01 PROCEDURE — 80053 COMPREHEN METABOLIC PANEL: CPT | Performed by: INTERNAL MEDICINE

## 2021-01-01 PROCEDURE — 84484 ASSAY OF TROPONIN QUANT: CPT | Performed by: INTERNAL MEDICINE

## 2021-01-01 PROCEDURE — 250N000009 HC RX 250: Performed by: NURSE PRACTITIONER

## 2021-01-01 PROCEDURE — 96365 THER/PROPH/DIAG IV INF INIT: CPT | Mod: 59

## 2021-01-01 PROCEDURE — 87205 SMEAR GRAM STAIN: CPT | Performed by: INTERNAL MEDICINE

## 2021-01-01 PROCEDURE — 999N000185 HC STATISTIC TRANSPORT TIME EA 15 MIN

## 2021-01-01 PROCEDURE — 82805 BLOOD GASES W/O2 SATURATION: CPT | Performed by: INTERNAL MEDICINE

## 2021-01-01 PROCEDURE — 99356 PR PROLONGED SERV,INPATIENT,1ST HR: CPT | Performed by: NURSE PRACTITIONER

## 2021-01-01 PROCEDURE — 85025 COMPLETE CBC W/AUTO DIFF WBC: CPT | Performed by: INTERNAL MEDICINE

## 2021-01-01 PROCEDURE — 250N000011 HC RX IP 250 OP 636: Performed by: NURSE PRACTITIONER

## 2021-01-01 PROCEDURE — 36592 COLLECT BLOOD FROM PICC: CPT | Performed by: INTERNAL MEDICINE

## 2021-01-01 PROCEDURE — 258N000003 HC RX IP 258 OP 636: Performed by: INTERNAL MEDICINE

## 2021-01-01 PROCEDURE — 84134 ASSAY OF PREALBUMIN: CPT | Performed by: INTERNAL MEDICINE

## 2021-01-01 PROCEDURE — 82803 BLOOD GASES ANY COMBINATION: CPT | Performed by: EMERGENCY MEDICINE

## 2021-01-01 PROCEDURE — 96375 TX/PRO/DX INJ NEW DRUG ADDON: CPT

## 2021-01-01 PROCEDURE — 74177 CT ABD & PELVIS W/CONTRAST: CPT

## 2021-01-01 PROCEDURE — 84478 ASSAY OF TRIGLYCERIDES: CPT | Performed by: INTERNAL MEDICINE

## 2021-01-01 PROCEDURE — 258N000003 HC RX IP 258 OP 636: Performed by: EMERGENCY MEDICINE

## 2021-01-01 PROCEDURE — 5A1955Z RESPIRATORY VENTILATION, GREATER THAN 96 CONSECUTIVE HOURS: ICD-10-PCS | Performed by: INTERNAL MEDICINE

## 2021-01-01 PROCEDURE — 84145 PROCALCITONIN (PCT): CPT | Performed by: HOSPITALIST

## 2021-01-01 PROCEDURE — 99223 1ST HOSP IP/OBS HIGH 75: CPT | Performed by: NURSE PRACTITIONER

## 2021-01-01 PROCEDURE — 85018 HEMOGLOBIN: CPT | Performed by: INTERNAL MEDICINE

## 2021-01-01 PROCEDURE — 80048 BASIC METABOLIC PNL TOTAL CA: CPT | Performed by: STUDENT IN AN ORGANIZED HEALTH CARE EDUCATION/TRAINING PROGRAM

## 2021-01-01 PROCEDURE — 250N000009 HC RX 250

## 2021-01-01 PROCEDURE — 87040 BLOOD CULTURE FOR BACTERIA: CPT | Performed by: EMERGENCY MEDICINE

## 2021-01-01 PROCEDURE — 999N000065 XR CHEST PORT 1 VIEW

## 2021-01-01 PROCEDURE — 99291 CRITICAL CARE FIRST HOUR: CPT | Performed by: INTERNAL MEDICINE

## 2021-01-01 PROCEDURE — 85014 HEMATOCRIT: CPT | Performed by: INTERNAL MEDICINE

## 2021-01-01 PROCEDURE — 5A09457 ASSISTANCE WITH RESPIRATORY VENTILATION, 24-96 CONSECUTIVE HOURS, CONTINUOUS POSITIVE AIRWAY PRESSURE: ICD-10-PCS | Performed by: HOSPITALIST

## 2021-01-01 PROCEDURE — 87040 BLOOD CULTURE FOR BACTERIA: CPT | Performed by: STUDENT IN AN ORGANIZED HEALTH CARE EDUCATION/TRAINING PROGRAM

## 2021-01-01 PROCEDURE — 81001 URINALYSIS AUTO W/SCOPE: CPT | Performed by: EMERGENCY MEDICINE

## 2021-01-01 PROCEDURE — 83605 ASSAY OF LACTIC ACID: CPT | Performed by: EMERGENCY MEDICINE

## 2021-01-01 PROCEDURE — 36415 COLL VENOUS BLD VENIPUNCTURE: CPT | Performed by: EMERGENCY MEDICINE

## 2021-01-01 PROCEDURE — 999N000215 HC STATISTIC HFNC ADULT NON-CPAP

## 2021-01-01 PROCEDURE — 70450 CT HEAD/BRAIN W/O DYE: CPT

## 2021-01-01 PROCEDURE — 999N000065 XR ABDOMEN PORT 1 VIEWS

## 2021-01-01 PROCEDURE — 86901 BLOOD TYPING SEROLOGIC RH(D): CPT | Performed by: INTERNAL MEDICINE

## 2021-01-01 PROCEDURE — 99285 EMERGENCY DEPT VISIT HI MDM: CPT | Mod: 25

## 2021-01-01 PROCEDURE — 84300 ASSAY OF URINE SODIUM: CPT | Performed by: INTERNAL MEDICINE

## 2021-01-01 PROCEDURE — 94644 CONT INHLJ TX 1ST HOUR: CPT

## 2021-01-01 PROCEDURE — 83036 HEMOGLOBIN GLYCOSYLATED A1C: CPT | Performed by: INTERNAL MEDICINE

## 2021-01-01 PROCEDURE — 250N000009 HC RX 250: Performed by: SURGERY

## 2021-01-01 PROCEDURE — 999N000127 HC STATISTIC PERIPHERAL IV START W US GUIDANCE

## 2021-01-01 PROCEDURE — G0463 HOSPITAL OUTPT CLINIC VISIT: HCPCS

## 2021-01-01 PROCEDURE — 250N000011 HC RX IP 250 OP 636: Performed by: NURSE ANESTHETIST, CERTIFIED REGISTERED

## 2021-01-01 PROCEDURE — 86140 C-REACTIVE PROTEIN: CPT | Performed by: HOSPITALIST

## 2021-01-01 PROCEDURE — 272N000026 HC KIT POWER PICC TRIPLE LUMEN

## 2021-01-01 PROCEDURE — 3E0436Z INTRODUCTION OF NUTRITIONAL SUBSTANCE INTO CENTRAL VEIN, PERCUTANEOUS APPROACH: ICD-10-PCS | Performed by: INTERNAL MEDICINE

## 2021-01-01 PROCEDURE — 258N000001 HC RX 258: Performed by: INTERNAL MEDICINE

## 2021-01-01 PROCEDURE — 32551 INSERTION OF CHEST TUBE: CPT | Performed by: SURGERY

## 2021-01-01 PROCEDURE — XW0DXM6 INTRODUCTION OF BARICITINIB INTO MOUTH AND PHARYNX, EXTERNAL APPROACH, NEW TECHNOLOGY GROUP 6: ICD-10-PCS | Performed by: HOSPITALIST

## 2021-01-01 PROCEDURE — 250N000011 HC RX IP 250 OP 636

## 2021-01-01 PROCEDURE — 250N000011 HC RX IP 250 OP 636: Performed by: STUDENT IN AN ORGANIZED HEALTH CARE EDUCATION/TRAINING PROGRAM

## 2021-01-01 PROCEDURE — 250N000011 HC RX IP 250 OP 636: Performed by: EMERGENCY MEDICINE

## 2021-01-01 PROCEDURE — 82040 ASSAY OF SERUM ALBUMIN: CPT | Performed by: INTERNAL MEDICINE

## 2021-01-01 PROCEDURE — 99291 CRITICAL CARE FIRST HOUR: CPT | Performed by: SURGERY

## 2021-01-01 PROCEDURE — 74018 RADEX ABDOMEN 1 VIEW: CPT

## 2021-01-01 PROCEDURE — 250N000012 HC RX MED GY IP 250 OP 636 PS 637: Performed by: INTERNAL MEDICINE

## 2021-01-01 PROCEDURE — 80053 COMPREHEN METABOLIC PANEL: CPT | Performed by: EMERGENCY MEDICINE

## 2021-01-01 PROCEDURE — 272N000054 HC CANNULA HIGH FLOW, ADULT

## 2021-01-01 PROCEDURE — 85730 THROMBOPLASTIN TIME PARTIAL: CPT | Performed by: INTERNAL MEDICINE

## 2021-01-01 PROCEDURE — 3E043XZ INTRODUCTION OF VASOPRESSOR INTO CENTRAL VEIN, PERCUTANEOUS APPROACH: ICD-10-PCS | Performed by: INTERNAL MEDICINE

## 2021-01-01 PROCEDURE — 84443 ASSAY THYROID STIM HORMONE: CPT | Performed by: HOSPITALIST

## 2021-01-01 PROCEDURE — 87077 CULTURE AEROBIC IDENTIFY: CPT | Performed by: INTERNAL MEDICINE

## 2021-01-01 PROCEDURE — 36415 COLL VENOUS BLD VENIPUNCTURE: CPT | Performed by: HOSPITALIST

## 2021-01-01 PROCEDURE — 96361 HYDRATE IV INFUSION ADD-ON: CPT

## 2021-01-01 PROCEDURE — 81001 URINALYSIS AUTO W/SCOPE: CPT | Performed by: STUDENT IN AN ORGANIZED HEALTH CARE EDUCATION/TRAINING PROGRAM

## 2021-01-01 PROCEDURE — 0W9B30Z DRAINAGE OF LEFT PLEURAL CAVITY WITH DRAINAGE DEVICE, PERCUTANEOUS APPROACH: ICD-10-PCS | Performed by: SURGERY

## 2021-01-01 PROCEDURE — 99291 CRITICAL CARE FIRST HOUR: CPT | Mod: 25 | Performed by: SURGERY

## 2021-01-01 PROCEDURE — 83690 ASSAY OF LIPASE: CPT | Performed by: EMERGENCY MEDICINE

## 2021-01-01 PROCEDURE — 71275 CT ANGIOGRAPHY CHEST: CPT

## 2021-01-01 PROCEDURE — 93005 ELECTROCARDIOGRAM TRACING: CPT

## 2021-01-01 PROCEDURE — 87186 SC STD MICRODIL/AGAR DIL: CPT | Performed by: EMERGENCY MEDICINE

## 2021-01-01 PROCEDURE — 84145 PROCALCITONIN (PCT): CPT | Performed by: STUDENT IN AN ORGANIZED HEALTH CARE EDUCATION/TRAINING PROGRAM

## 2021-01-01 PROCEDURE — 36569 INSJ PICC 5 YR+ W/O IMAGING: CPT

## 2021-01-01 PROCEDURE — 85027 COMPLETE CBC AUTOMATED: CPT | Performed by: STUDENT IN AN ORGANIZED HEALTH CARE EDUCATION/TRAINING PROGRAM

## 2021-01-01 PROCEDURE — 36592 COLLECT BLOOD FROM PICC: CPT | Performed by: STUDENT IN AN ORGANIZED HEALTH CARE EDUCATION/TRAINING PROGRAM

## 2021-01-01 PROCEDURE — 85025 COMPLETE CBC W/AUTO DIFF WBC: CPT | Performed by: HOSPITALIST

## 2021-01-01 PROCEDURE — C9803 HOPD COVID-19 SPEC COLLECT: HCPCS

## 2021-01-01 PROCEDURE — P9041 ALBUMIN (HUMAN),5%, 50ML: HCPCS | Performed by: INTERNAL MEDICINE

## 2021-01-01 RX ORDER — MEROPENEM 1 G/1
1 INJECTION, POWDER, FOR SOLUTION INTRAVENOUS EVERY 12 HOURS
Status: DISCONTINUED | OUTPATIENT
Start: 2021-01-01 | End: 2021-01-01

## 2021-01-01 RX ORDER — HYDROXYZINE HYDROCHLORIDE 10 MG/1
10 TABLET, FILM COATED ORAL EVERY 4 HOURS PRN
Status: DISCONTINUED | OUTPATIENT
Start: 2021-01-01 | End: 2021-01-01

## 2021-01-01 RX ORDER — LISINOPRIL 20 MG/1
20 TABLET ORAL DAILY
COMMUNITY

## 2021-01-01 RX ORDER — CYANOCOBALAMIN 1000 UG/ML
1 INJECTION, SOLUTION INTRAMUSCULAR; SUBCUTANEOUS
COMMUNITY

## 2021-01-01 RX ORDER — AMOXICILLIN 250 MG
1 CAPSULE ORAL 2 TIMES DAILY PRN
Status: DISCONTINUED | OUTPATIENT
Start: 2021-01-01 | End: 2021-01-01

## 2021-01-01 RX ORDER — PROPOFOL 10 MG/ML
5-75 INJECTION, EMULSION INTRAVENOUS CONTINUOUS
Status: DISCONTINUED | OUTPATIENT
Start: 2021-01-01 | End: 2021-01-01

## 2021-01-01 RX ORDER — FENTANYL CITRATE 50 UG/ML
50-100 INJECTION, SOLUTION INTRAMUSCULAR; INTRAVENOUS EVERY 30 MIN PRN
Status: DISCONTINUED | OUTPATIENT
Start: 2021-01-01 | End: 2021-01-01

## 2021-01-01 RX ORDER — GUAIFENESIN/DEXTROMETHORPHAN 100-10MG/5
10 SYRUP ORAL EVERY 4 HOURS PRN
Status: DISCONTINUED | OUTPATIENT
Start: 2021-01-01 | End: 2021-01-01 | Stop reason: HOSPADM

## 2021-01-01 RX ORDER — CARBOXYMETHYLCELLULOSE SODIUM 5 MG/ML
1 SOLUTION/ DROPS OPHTHALMIC
Status: DISCONTINUED | OUTPATIENT
Start: 2021-01-01 | End: 2021-01-01 | Stop reason: HOSPADM

## 2021-01-01 RX ORDER — CALCIUM GLUCONATE 94 MG/ML
INJECTION, SOLUTION INTRAVENOUS
Status: COMPLETED
Start: 2021-01-01 | End: 2021-01-01

## 2021-01-01 RX ORDER — EPINEPHRINE IN 0.9 % SOD CHLOR 5 MG/250ML
.01-.3 PLASTIC BAG, INJECTION (ML) INTRAVENOUS CONTINUOUS
Status: DISCONTINUED | OUTPATIENT
Start: 2021-01-01 | End: 2021-01-01 | Stop reason: CLARIF

## 2021-01-01 RX ORDER — ACETAMINOPHEN 650 MG/1
650 SUPPOSITORY RECTAL EVERY 4 HOURS PRN
Status: DISCONTINUED | OUTPATIENT
Start: 2021-01-01 | End: 2021-01-01 | Stop reason: HOSPADM

## 2021-01-01 RX ORDER — DEXAMETHASONE SODIUM PHOSPHATE 10 MG/ML
6 INJECTION, SOLUTION INTRAMUSCULAR; INTRAVENOUS DAILY
Status: COMPLETED | OUTPATIENT
Start: 2021-01-01 | End: 2021-01-01

## 2021-01-01 RX ORDER — EPINEPHRINE IN SOD CHLOR,ISO 1 MG/10 ML
SYRINGE (ML) INTRAVENOUS
Status: DISPENSED
Start: 2021-01-01 | End: 2021-01-01

## 2021-01-01 RX ORDER — PROCHLORPERAZINE MALEATE 5 MG
5 TABLET ORAL EVERY 6 HOURS PRN
Status: DISCONTINUED | OUTPATIENT
Start: 2021-01-01 | End: 2021-01-01 | Stop reason: HOSPADM

## 2021-01-01 RX ORDER — SIMVASTATIN 40 MG
40 TABLET ORAL AT BEDTIME
COMMUNITY

## 2021-01-01 RX ORDER — SODIUM CHLORIDE, SODIUM LACTATE, POTASSIUM CHLORIDE, CALCIUM CHLORIDE 600; 310; 30; 20 MG/100ML; MG/100ML; MG/100ML; MG/100ML
INJECTION, SOLUTION INTRAVENOUS CONTINUOUS
Status: DISCONTINUED | OUTPATIENT
Start: 2021-01-01 | End: 2021-01-01

## 2021-01-01 RX ORDER — ERGOCALCIFEROL 1.25 MG/1
50000 CAPSULE ORAL
COMMUNITY

## 2021-01-01 RX ORDER — GLYCOPYRROLATE 0.2 MG/ML
0.2 INJECTION, SOLUTION INTRAMUSCULAR; INTRAVENOUS ONCE
Status: COMPLETED | OUTPATIENT
Start: 2021-01-01 | End: 2021-01-01

## 2021-01-01 RX ORDER — DEXTROSE MONOHYDRATE 25 G/50ML
25-50 INJECTION, SOLUTION INTRAVENOUS
Status: DISCONTINUED | OUTPATIENT
Start: 2021-01-01 | End: 2021-01-01 | Stop reason: HOSPADM

## 2021-01-01 RX ORDER — LISINOPRIL 20 MG/1
20 TABLET ORAL DAILY
Status: DISCONTINUED | OUTPATIENT
Start: 2021-01-01 | End: 2021-01-01

## 2021-01-01 RX ORDER — PROPOFOL 10 MG/ML
INJECTION, EMULSION INTRAVENOUS PRN
Status: DISCONTINUED | OUTPATIENT
Start: 2021-01-01 | End: 2021-01-01

## 2021-01-01 RX ORDER — FUROSEMIDE 10 MG/ML
40 INJECTION INTRAMUSCULAR; INTRAVENOUS ONCE
Status: DISCONTINUED | OUTPATIENT
Start: 2021-01-01 | End: 2021-01-01

## 2021-01-01 RX ORDER — CHLORHEXIDINE GLUCONATE ORAL RINSE 1.2 MG/ML
15 SOLUTION DENTAL 2 TIMES DAILY
Status: DISCONTINUED | OUTPATIENT
Start: 2021-01-01 | End: 2021-01-01 | Stop reason: HOSPADM

## 2021-01-01 RX ORDER — FLUOXETINE 40 MG/1
40 CAPSULE ORAL DAILY
COMMUNITY

## 2021-01-01 RX ORDER — ALBUMIN, HUMAN INJ 5% 5 %
25 SOLUTION INTRAVENOUS ONCE
Status: COMPLETED | OUTPATIENT
Start: 2021-01-01 | End: 2021-01-01

## 2021-01-01 RX ORDER — LEVOTHYROXINE SODIUM 75 UG/1
75 TABLET ORAL DAILY
Status: DISCONTINUED | OUTPATIENT
Start: 2021-01-01 | End: 2021-01-01

## 2021-01-01 RX ORDER — NALOXONE HYDROCHLORIDE 0.4 MG/ML
0.2 INJECTION, SOLUTION INTRAMUSCULAR; INTRAVENOUS; SUBCUTANEOUS
Status: DISCONTINUED | OUTPATIENT
Start: 2021-01-01 | End: 2021-01-01 | Stop reason: HOSPADM

## 2021-01-01 RX ORDER — IOPAMIDOL 755 MG/ML
500 INJECTION, SOLUTION INTRAVASCULAR ONCE
Status: COMPLETED | OUTPATIENT
Start: 2021-01-01 | End: 2021-01-01

## 2021-01-01 RX ORDER — NOREPINEPHRINE BITARTRATE 0.02 MG/ML
.01-.6 INJECTION, SOLUTION INTRAVENOUS CONTINUOUS
Status: DISCONTINUED | OUTPATIENT
Start: 2021-01-01 | End: 2021-01-01

## 2021-01-01 RX ORDER — FENTANYL CITRATE 50 UG/ML
50 INJECTION, SOLUTION INTRAMUSCULAR; INTRAVENOUS EVERY 30 MIN PRN
Status: DISCONTINUED | OUTPATIENT
Start: 2021-01-01 | End: 2021-01-01

## 2021-01-01 RX ORDER — DEXTROSE MONOHYDRATE 25 G/50ML
25-50 INJECTION, SOLUTION INTRAVENOUS
Status: DISCONTINUED | OUTPATIENT
Start: 2021-01-01 | End: 2021-01-01

## 2021-01-01 RX ORDER — FENTANYL CITRATE 50 UG/ML
50 INJECTION, SOLUTION INTRAMUSCULAR; INTRAVENOUS EVERY 10 MIN PRN
Status: DISCONTINUED | OUTPATIENT
Start: 2021-01-01 | End: 2021-01-01 | Stop reason: HOSPADM

## 2021-01-01 RX ORDER — LORAZEPAM 2 MG/ML
1 INJECTION INTRAMUSCULAR EVERY 10 MIN PRN
Status: DISCONTINUED | OUTPATIENT
Start: 2021-01-01 | End: 2021-01-01 | Stop reason: HOSPADM

## 2021-01-01 RX ORDER — SODIUM CHLORIDE 9 MG/ML
INJECTION, SOLUTION INTRAVENOUS CONTINUOUS
Status: DISCONTINUED | OUTPATIENT
Start: 2021-01-01 | End: 2021-01-01

## 2021-01-01 RX ORDER — NALOXONE HYDROCHLORIDE 0.4 MG/ML
0.4 INJECTION, SOLUTION INTRAMUSCULAR; INTRAVENOUS; SUBCUTANEOUS
Status: DISCONTINUED | OUTPATIENT
Start: 2021-01-01 | End: 2021-01-01 | Stop reason: HOSPADM

## 2021-01-01 RX ORDER — ASPIRIN 81 MG/1
81 TABLET ORAL DAILY
COMMUNITY

## 2021-01-01 RX ORDER — DEXTROSE MONOHYDRATE 100 MG/ML
INJECTION, SOLUTION INTRAVENOUS CONTINUOUS PRN
Status: DISCONTINUED | OUTPATIENT
Start: 2021-01-01 | End: 2021-01-01 | Stop reason: HOSPADM

## 2021-01-01 RX ORDER — LACTULOSE 10 G/15ML
20 SOLUTION ORAL 2 TIMES DAILY
Status: DISCONTINUED | OUTPATIENT
Start: 2021-01-01 | End: 2021-01-01 | Stop reason: HOSPADM

## 2021-01-01 RX ORDER — BISACODYL 10 MG
10 SUPPOSITORY, RECTAL RECTAL DAILY PRN
Status: DISCONTINUED | OUTPATIENT
Start: 2021-01-01 | End: 2021-01-01 | Stop reason: HOSPADM

## 2021-01-01 RX ORDER — HYDROXYZINE HYDROCHLORIDE 10 MG/1
10 TABLET, FILM COATED ORAL EVERY 4 HOURS PRN
Status: DISCONTINUED | OUTPATIENT
Start: 2021-01-01 | End: 2021-01-01 | Stop reason: HOSPADM

## 2021-01-01 RX ORDER — DEXAMETHASONE SODIUM PHOSPHATE 10 MG/ML
6 INJECTION, SOLUTION INTRAMUSCULAR; INTRAVENOUS ONCE
Status: COMPLETED | OUTPATIENT
Start: 2021-01-01 | End: 2021-01-01

## 2021-01-01 RX ORDER — ASPIRIN 81 MG/1
81 TABLET ORAL DAILY
Status: DISCONTINUED | OUTPATIENT
Start: 2021-01-01 | End: 2021-01-01 | Stop reason: HOSPADM

## 2021-01-01 RX ORDER — AMINO AC/PROTEIN HYDR/WHEY PRO 10G-100/30
1 LIQUID (ML) ORAL 3 TIMES DAILY
Status: DISCONTINUED | OUTPATIENT
Start: 2021-01-01 | End: 2021-01-01 | Stop reason: HOSPADM

## 2021-01-01 RX ORDER — LIDOCAINE HYDROCHLORIDE 20 MG/ML
5 SOLUTION OROPHARYNGEAL ONCE
Status: DISCONTINUED | OUTPATIENT
Start: 2021-01-01 | End: 2021-01-01 | Stop reason: HOSPADM

## 2021-01-01 RX ORDER — GUAIFENESIN/DEXTROMETHORPHAN 100-10MG/5
10 SYRUP ORAL EVERY 4 HOURS PRN
Status: DISCONTINUED | OUTPATIENT
Start: 2021-01-01 | End: 2021-01-01

## 2021-01-01 RX ORDER — OXYCODONE HCL 5 MG/5 ML
2.5 SOLUTION, ORAL ORAL EVERY 6 HOURS PRN
Status: DISCONTINUED | OUTPATIENT
Start: 2021-01-01 | End: 2021-01-01 | Stop reason: HOSPADM

## 2021-01-01 RX ORDER — ONDANSETRON 2 MG/ML
4 INJECTION INTRAMUSCULAR; INTRAVENOUS EVERY 6 HOURS PRN
Status: DISCONTINUED | OUTPATIENT
Start: 2021-01-01 | End: 2021-01-01 | Stop reason: HOSPADM

## 2021-01-01 RX ORDER — LIDOCAINE 40 MG/G
CREAM TOPICAL
Status: DISCONTINUED | OUTPATIENT
Start: 2021-01-01 | End: 2021-01-01

## 2021-01-01 RX ORDER — ACETAMINOPHEN 325 MG/1
650 TABLET ORAL EVERY 6 HOURS PRN
Status: DISCONTINUED | OUTPATIENT
Start: 2021-01-01 | End: 2021-01-01

## 2021-01-01 RX ORDER — PROCHLORPERAZINE 25 MG
12.5 SUPPOSITORY, RECTAL RECTAL EVERY 12 HOURS PRN
Status: DISCONTINUED | OUTPATIENT
Start: 2021-01-01 | End: 2021-01-01 | Stop reason: HOSPADM

## 2021-01-01 RX ORDER — DEXTROSE MONOHYDRATE 100 MG/ML
INJECTION, SOLUTION INTRAVENOUS CONTINUOUS PRN
Status: DISCONTINUED | OUTPATIENT
Start: 2021-01-01 | End: 2021-01-01

## 2021-01-01 RX ORDER — ACETAMINOPHEN 650 MG/1
650 SUPPOSITORY RECTAL EVERY 6 HOURS PRN
Status: DISCONTINUED | OUTPATIENT
Start: 2021-01-01 | End: 2021-01-01

## 2021-01-01 RX ORDER — HYDROMORPHONE HYDROCHLORIDE 1 MG/ML
0.3 INJECTION, SOLUTION INTRAMUSCULAR; INTRAVENOUS; SUBCUTANEOUS EVERY 4 HOURS PRN
Status: DISCONTINUED | OUTPATIENT
Start: 2021-01-01 | End: 2021-01-01

## 2021-01-01 RX ORDER — BENZONATATE 100 MG/1
100 CAPSULE ORAL 3 TIMES DAILY PRN
Status: DISCONTINUED | OUTPATIENT
Start: 2021-01-01 | End: 2021-01-01

## 2021-01-01 RX ORDER — NICOTINE POLACRILEX 4 MG
15-30 LOZENGE BUCCAL
Status: DISCONTINUED | OUTPATIENT
Start: 2021-01-01 | End: 2021-01-01

## 2021-01-01 RX ORDER — LEVOTHYROXINE SODIUM 75 UG/1
75 TABLET ORAL DAILY
COMMUNITY

## 2021-01-01 RX ORDER — SIMVASTATIN 40 MG
40 TABLET ORAL AT BEDTIME
Status: DISCONTINUED | OUTPATIENT
Start: 2021-01-01 | End: 2021-01-01 | Stop reason: HOSPADM

## 2021-01-01 RX ORDER — DEXMEDETOMIDINE HYDROCHLORIDE 4 UG/ML
.1-1.2 INJECTION, SOLUTION INTRAVENOUS CONTINUOUS
Status: DISCONTINUED | OUTPATIENT
Start: 2021-01-01 | End: 2021-01-01

## 2021-01-01 RX ORDER — AMOXICILLIN 250 MG
2 CAPSULE ORAL 2 TIMES DAILY PRN
Status: DISCONTINUED | OUTPATIENT
Start: 2021-01-01 | End: 2021-01-01

## 2021-01-01 RX ORDER — ONDANSETRON 4 MG/1
4 TABLET, ORALLY DISINTEGRATING ORAL EVERY 6 HOURS PRN
Status: DISCONTINUED | OUTPATIENT
Start: 2021-01-01 | End: 2021-01-01 | Stop reason: HOSPADM

## 2021-01-01 RX ORDER — DEXAMETHASONE SODIUM PHOSPHATE 10 MG/ML
12 INJECTION, SOLUTION INTRAMUSCULAR; INTRAVENOUS DAILY
Status: DISCONTINUED | OUTPATIENT
Start: 2021-01-01 | End: 2021-01-01

## 2021-01-01 RX ORDER — FENTANYL CITRATE 50 UG/ML
50 INJECTION, SOLUTION INTRAMUSCULAR; INTRAVENOUS EVERY 30 MIN PRN
Status: DISCONTINUED | OUTPATIENT
Start: 2021-01-01 | End: 2021-01-01 | Stop reason: HOSPADM

## 2021-01-01 RX ORDER — DEXAMETHASONE SODIUM PHOSPHATE 10 MG/ML
6 INJECTION, SOLUTION INTRAMUSCULAR; INTRAVENOUS DAILY
Status: DISCONTINUED | OUTPATIENT
Start: 2021-01-01 | End: 2021-01-01

## 2021-01-01 RX ORDER — PROPOFOL 10 MG/ML
10 INJECTION, EMULSION INTRAVENOUS CONTINUOUS
Status: DISCONTINUED | OUTPATIENT
Start: 2021-01-01 | End: 2021-01-01 | Stop reason: HOSPADM

## 2021-01-01 RX ORDER — CEFAZOLIN SODIUM 1 G/50ML
1 INJECTION, SOLUTION INTRAVENOUS EVERY 8 HOURS
Status: DISCONTINUED | OUTPATIENT
Start: 2021-01-01 | End: 2021-01-01

## 2021-01-01 RX ORDER — ATROPINE SULFATE 10 MG/ML
1 SOLUTION/ DROPS OPHTHALMIC
Status: DISCONTINUED | OUTPATIENT
Start: 2021-01-01 | End: 2021-01-01 | Stop reason: HOSPADM

## 2021-01-01 RX ORDER — PHENYLEPHRINE HYDROCHLORIDE 10 MG/ML
INJECTION INTRAVENOUS PRN
Status: DISCONTINUED | OUTPATIENT
Start: 2021-01-01 | End: 2021-01-01

## 2021-01-01 RX ORDER — GUAIFENESIN 600 MG/1
15 TABLET, EXTENDED RELEASE ORAL DAILY
Status: DISCONTINUED | OUTPATIENT
Start: 2021-01-01 | End: 2021-01-01 | Stop reason: HOSPADM

## 2021-01-01 RX ORDER — NICOTINE POLACRILEX 4 MG
15-30 LOZENGE BUCCAL
Status: DISCONTINUED | OUTPATIENT
Start: 2021-01-01 | End: 2021-01-01 | Stop reason: HOSPADM

## 2021-01-01 RX ADMIN — PROPOFOL 30 MCG/KG/MIN: 10 INJECTION, EMULSION INTRAVENOUS at 01:00

## 2021-01-01 RX ADMIN — ENOXAPARIN SODIUM 40 MG: 40 INJECTION SUBCUTANEOUS at 23:00

## 2021-01-01 RX ADMIN — Medication 100 MCG/HR: at 04:29

## 2021-01-01 RX ADMIN — DOCUSATE SODIUM 100 MG: 50 LIQUID ORAL at 20:07

## 2021-01-01 RX ADMIN — ENOXAPARIN SODIUM 40 MG: 40 INJECTION SUBCUTANEOUS at 22:43

## 2021-01-01 RX ADMIN — IOPAMIDOL 80 ML: 755 INJECTION, SOLUTION INTRAVENOUS at 19:32

## 2021-01-01 RX ADMIN — FLUOXETINE 60 MG: 20 CAPSULE ORAL at 09:06

## 2021-01-01 RX ADMIN — DEXAMETHASONE SODIUM PHOSPHATE 6 MG: 10 INJECTION INTRAMUSCULAR; INTRAVENOUS at 13:16

## 2021-01-01 RX ADMIN — IPRATROPIUM BROMIDE AND ALBUTEROL 2 PUFF: 20; 100 SPRAY, METERED RESPIRATORY (INHALATION) at 20:35

## 2021-01-01 RX ADMIN — FLUOXETINE 60 MG: 20 CAPSULE ORAL at 08:28

## 2021-01-01 RX ADMIN — CEFAZOLIN SODIUM 1 G: 1 INJECTION, SOLUTION INTRAVENOUS at 02:18

## 2021-01-01 RX ADMIN — DEXAMETHASONE SODIUM PHOSPHATE 12 MG: 10 INJECTION INTRAMUSCULAR; INTRAVENOUS at 12:55

## 2021-01-01 RX ADMIN — LISINOPRIL 20 MG: 20 TABLET ORAL at 08:49

## 2021-01-01 RX ADMIN — IPRATROPIUM BROMIDE AND ALBUTEROL 2 PUFF: 20; 100 SPRAY, METERED RESPIRATORY (INHALATION) at 17:27

## 2021-01-01 RX ADMIN — MEROPENEM 1 G: 1 INJECTION, POWDER, FOR SOLUTION INTRAVENOUS at 09:08

## 2021-01-01 RX ADMIN — PROPOFOL 25 MCG/KG/MIN: 10 INJECTION, EMULSION INTRAVENOUS at 12:34

## 2021-01-01 RX ADMIN — MEROPENEM 1 G: 1 INJECTION, POWDER, FOR SOLUTION INTRAVENOUS at 09:07

## 2021-01-01 RX ADMIN — EPOPROSTENOL 20 NG/KG/MIN: 1.5 INJECTION, POWDER, LYOPHILIZED, FOR SOLUTION INTRAVENOUS at 14:22

## 2021-01-01 RX ADMIN — Medication 1 PACKET: at 16:25

## 2021-01-01 RX ADMIN — PROPOFOL 75 MCG/KG/MIN: 10 INJECTION, EMULSION INTRAVENOUS at 22:15

## 2021-01-01 RX ADMIN — Medication 1 PACKET: at 15:54

## 2021-01-01 RX ADMIN — SODIUM CHLORIDE 1000 ML: 9 INJECTION, SOLUTION INTRAVENOUS at 00:41

## 2021-01-01 RX ADMIN — CHLORHEXIDINE GLUCONATE 15 ML: 1.2 SOLUTION ORAL at 22:06

## 2021-01-01 RX ADMIN — ENOXAPARIN SODIUM 40 MG: 40 INJECTION SUBCUTANEOUS at 08:21

## 2021-01-01 RX ADMIN — EPOPROSTENOL 20 NG/KG/MIN: 1.5 INJECTION, POWDER, LYOPHILIZED, FOR SOLUTION INTRAVENOUS at 09:28

## 2021-01-01 RX ADMIN — MINERAL OIL AND PETROLATUM 3.5 G: 150; 830 OINTMENT OPHTHALMIC at 22:06

## 2021-01-01 RX ADMIN — Medication 1 PACKET: at 17:36

## 2021-01-01 RX ADMIN — SENNOSIDES A AND B 15 ML: 8.8 SYRUP ORAL at 09:22

## 2021-01-01 RX ADMIN — SENNOSIDES 15 ML: 8.8 LIQUID ORAL at 10:55

## 2021-01-01 RX ADMIN — LISINOPRIL 20 MG: 20 TABLET ORAL at 10:03

## 2021-01-01 RX ADMIN — Medication 1 PACKET: at 08:21

## 2021-01-01 RX ADMIN — ENOXAPARIN SODIUM 40 MG: 40 INJECTION SUBCUTANEOUS at 22:12

## 2021-01-01 RX ADMIN — PROPOFOL 75 MCG/KG/MIN: 10 INJECTION, EMULSION INTRAVENOUS at 06:38

## 2021-01-01 RX ADMIN — IPRATROPIUM BROMIDE AND ALBUTEROL 2 PUFF: 20; 100 SPRAY, METERED RESPIRATORY (INHALATION) at 08:18

## 2021-01-01 RX ADMIN — ENOXAPARIN SODIUM 40 MG: 40 INJECTION SUBCUTANEOUS at 10:24

## 2021-01-01 RX ADMIN — Medication 0.06 MCG/KG/MIN: at 16:33

## 2021-01-01 RX ADMIN — PROPOFOL 10 MCG/KG/MIN: 10 INJECTION, EMULSION INTRAVENOUS at 23:36

## 2021-01-01 RX ADMIN — SODIUM CHLORIDE 50 ML: 9 INJECTION, SOLUTION INTRAVENOUS at 18:40

## 2021-01-01 RX ADMIN — REMDESIVIR 100 MG: 100 INJECTION, POWDER, LYOPHILIZED, FOR SOLUTION INTRAVENOUS at 17:34

## 2021-01-01 RX ADMIN — FAMOTIDINE 20 MG: 10 INJECTION, SOLUTION INTRAVENOUS at 09:03

## 2021-01-01 RX ADMIN — Medication 200 MCG/HR: at 14:55

## 2021-01-01 RX ADMIN — SENNOSIDES A AND B 15 ML: 8.8 SYRUP ORAL at 08:13

## 2021-01-01 RX ADMIN — INSULIN ASPART 1 UNITS: 100 INJECTION, SOLUTION INTRAVENOUS; SUBCUTANEOUS at 15:54

## 2021-01-01 RX ADMIN — PROPOFOL 75 MCG/KG/MIN: 10 INJECTION, EMULSION INTRAVENOUS at 03:36

## 2021-01-01 RX ADMIN — PANTOPRAZOLE SODIUM 40 MG: 40 INJECTION, POWDER, FOR SOLUTION INTRAVENOUS at 08:13

## 2021-01-01 RX ADMIN — PANTOPRAZOLE SODIUM 40 MG: 40 INJECTION, POWDER, FOR SOLUTION INTRAVENOUS at 08:46

## 2021-01-01 RX ADMIN — MINERAL OIL AND PETROLATUM 3.5 G: 150; 830 OINTMENT OPHTHALMIC at 18:25

## 2021-01-01 RX ADMIN — DEXAMETHASONE SODIUM PHOSPHATE 12 MG: 10 INJECTION INTRAMUSCULAR; INTRAVENOUS at 13:20

## 2021-01-01 RX ADMIN — LEVOTHYROXINE SODIUM 75 MCG: 0.07 TABLET ORAL at 07:52

## 2021-01-01 RX ADMIN — LEVOTHYROXINE SODIUM 75 MCG: 0.07 TABLET ORAL at 08:12

## 2021-01-01 RX ADMIN — SODIUM CHLORIDE 500 ML: 9 INJECTION, SOLUTION INTRAVENOUS at 20:19

## 2021-01-01 RX ADMIN — ASPIRIN 81 MG: 81 TABLET, COATED ORAL at 09:07

## 2021-01-01 RX ADMIN — Medication 0.07 MCG/KG/MIN: at 04:23

## 2021-01-01 RX ADMIN — DOCUSATE SODIUM 100 MG: 50 LIQUID ORAL at 08:13

## 2021-01-01 RX ADMIN — SENNOSIDES A AND B 15 ML: 8.8 SYRUP ORAL at 08:21

## 2021-01-01 RX ADMIN — ENOXAPARIN SODIUM 40 MG: 40 INJECTION SUBCUTANEOUS at 08:29

## 2021-01-01 RX ADMIN — Medication 60 MG: at 23:22

## 2021-01-01 RX ADMIN — IPRATROPIUM BROMIDE AND ALBUTEROL 2 PUFF: 20; 100 SPRAY, METERED RESPIRATORY (INHALATION) at 19:53

## 2021-01-01 RX ADMIN — CEFAZOLIN SODIUM 1 G: 1 INJECTION, SOLUTION INTRAVENOUS at 18:25

## 2021-01-01 RX ADMIN — HYDROXYZINE HYDROCHLORIDE 10 MG: 10 TABLET ORAL at 00:15

## 2021-01-01 RX ADMIN — INSULIN ASPART 1 UNITS: 100 INJECTION, SOLUTION INTRAVENOUS; SUBCUTANEOUS at 05:20

## 2021-01-01 RX ADMIN — DEXAMETHASONE SODIUM PHOSPHATE 12 MG: 10 INJECTION INTRAMUSCULAR; INTRAVENOUS at 11:54

## 2021-01-01 RX ADMIN — ALBUMIN HUMAN 25 G: 0.05 INJECTION, SOLUTION INTRAVENOUS at 10:03

## 2021-01-01 RX ADMIN — EPOPROSTENOL 20 NG/KG/MIN: 1.5 INJECTION, POWDER, LYOPHILIZED, FOR SOLUTION INTRAVENOUS at 20:10

## 2021-01-01 RX ADMIN — ENOXAPARIN SODIUM 40 MG: 40 INJECTION SUBCUTANEOUS at 21:39

## 2021-01-01 RX ADMIN — EPOPROSTENOL 20 NG/KG/MIN: 1.5 INJECTION, POWDER, LYOPHILIZED, FOR SOLUTION INTRAVENOUS at 10:29

## 2021-01-01 RX ADMIN — DEXAMETHASONE SODIUM PHOSPHATE 6 MG: 10 INJECTION INTRAMUSCULAR; INTRAVENOUS at 13:27

## 2021-01-01 RX ADMIN — PROPOFOL 75 MCG/KG/MIN: 10 INJECTION, EMULSION INTRAVENOUS at 17:11

## 2021-01-01 RX ADMIN — PROPOFOL 75 MCG/KG/MIN: 10 INJECTION, EMULSION INTRAVENOUS at 12:26

## 2021-01-01 RX ADMIN — FAMOTIDINE 20 MG: 10 INJECTION, SOLUTION INTRAVENOUS at 09:02

## 2021-01-01 RX ADMIN — ENOXAPARIN SODIUM 40 MG: 40 INJECTION SUBCUTANEOUS at 21:00

## 2021-01-01 RX ADMIN — MEROPENEM 1 G: 1 INJECTION, POWDER, FOR SOLUTION INTRAVENOUS at 22:01

## 2021-01-01 RX ADMIN — PROPOFOL 75 MCG/KG/MIN: 10 INJECTION, EMULSION INTRAVENOUS at 17:00

## 2021-01-01 RX ADMIN — SENNOSIDES 15 ML: 8.8 LIQUID ORAL at 08:51

## 2021-01-01 RX ADMIN — IPRATROPIUM BROMIDE AND ALBUTEROL 2 PUFF: 20; 100 SPRAY, METERED RESPIRATORY (INHALATION) at 09:10

## 2021-01-01 RX ADMIN — FLUOXETINE 60 MG: 20 CAPSULE ORAL at 11:10

## 2021-01-01 RX ADMIN — TAZOBACTAM SODIUM AND PIPERACILLIN SODIUM 4.5 G: 500; 4 INJECTION, SOLUTION INTRAVENOUS at 15:54

## 2021-01-01 RX ADMIN — LEVOTHYROXINE SODIUM 75 MCG: 0.07 TABLET ORAL at 08:34

## 2021-01-01 RX ADMIN — INSULIN ASPART 1 UNITS: 100 INJECTION, SOLUTION INTRAVENOUS; SUBCUTANEOUS at 20:01

## 2021-01-01 RX ADMIN — LEVOTHYROXINE SODIUM 75 MCG: 0.07 TABLET ORAL at 09:06

## 2021-01-01 RX ADMIN — ENOXAPARIN SODIUM 40 MG: 40 INJECTION SUBCUTANEOUS at 10:05

## 2021-01-01 RX ADMIN — MINERAL OIL AND PETROLATUM 3.5 G: 150; 830 OINTMENT OPHTHALMIC at 09:22

## 2021-01-01 RX ADMIN — Medication 50 MEQ: at 19:25

## 2021-01-01 RX ADMIN — SENNOSIDES A AND B 15 ML: 8.8 SYRUP ORAL at 08:46

## 2021-01-01 RX ADMIN — ASPIRIN 81 MG: 81 TABLET, COATED ORAL at 10:03

## 2021-01-01 RX ADMIN — Medication 1 PACKET: at 08:13

## 2021-01-01 RX ADMIN — PROPOFOL 75 MCG/KG/MIN: 10 INJECTION, EMULSION INTRAVENOUS at 20:10

## 2021-01-01 RX ADMIN — MIDAZOLAM 2 MG: 1 INJECTION INTRAMUSCULAR; INTRAVENOUS at 23:56

## 2021-01-01 RX ADMIN — PANTOPRAZOLE SODIUM 40 MG: 40 INJECTION, POWDER, FOR SOLUTION INTRAVENOUS at 09:00

## 2021-01-01 RX ADMIN — ACETAMINOPHEN 650 MG: 325 SOLUTION ORAL at 09:25

## 2021-01-01 RX ADMIN — Medication 200 MCG/HR: at 12:03

## 2021-01-01 RX ADMIN — MEROPENEM 1 G: 1 INJECTION, POWDER, FOR SOLUTION INTRAVENOUS at 08:29

## 2021-01-01 RX ADMIN — Medication 0.18 MCG/KG/MIN: at 10:53

## 2021-01-01 RX ADMIN — PROPOFOL 75 MCG/KG/MIN: 10 INJECTION, EMULSION INTRAVENOUS at 11:32

## 2021-01-01 RX ADMIN — LEVOTHYROXINE SODIUM 75 MCG: 0.07 TABLET ORAL at 08:49

## 2021-01-01 RX ADMIN — FAMOTIDINE 20 MG: 10 INJECTION, SOLUTION INTRAVENOUS at 09:10

## 2021-01-01 RX ADMIN — PROPOFOL 60 MCG/KG/MIN: 10 INJECTION, EMULSION INTRAVENOUS at 01:53

## 2021-01-01 RX ADMIN — ASPIRIN 81 MG: 81 TABLET, COATED ORAL at 08:41

## 2021-01-01 RX ADMIN — IPRATROPIUM BROMIDE AND ALBUTEROL 2 PUFF: 20; 100 SPRAY, METERED RESPIRATORY (INHALATION) at 15:49

## 2021-01-01 RX ADMIN — PROPOFOL 75 MCG/KG/MIN: 10 INJECTION, EMULSION INTRAVENOUS at 03:50

## 2021-01-01 RX ADMIN — IPRATROPIUM BROMIDE AND ALBUTEROL 2 PUFF: 20; 100 SPRAY, METERED RESPIRATORY (INHALATION) at 16:47

## 2021-01-01 RX ADMIN — LISINOPRIL 20 MG: 20 TABLET ORAL at 09:07

## 2021-01-01 RX ADMIN — EPOPROSTENOL 20 NG/KG/MIN: 1.5 INJECTION, POWDER, LYOPHILIZED, FOR SOLUTION INTRAVENOUS at 23:05

## 2021-01-01 RX ADMIN — Medication 1 PACKET: at 08:51

## 2021-01-01 RX ADMIN — EPOPROSTENOL 20 NG/KG/MIN: 1.5 INJECTION, POWDER, LYOPHILIZED, FOR SOLUTION INTRAVENOUS at 00:55

## 2021-01-01 RX ADMIN — PROPOFOL 75 MCG/KG/MIN: 10 INJECTION, EMULSION INTRAVENOUS at 15:00

## 2021-01-01 RX ADMIN — FAMOTIDINE 20 MG: 10 INJECTION, SOLUTION INTRAVENOUS at 00:38

## 2021-01-01 RX ADMIN — ENOXAPARIN SODIUM 40 MG: 40 INJECTION SUBCUTANEOUS at 10:30

## 2021-01-01 RX ADMIN — IPRATROPIUM BROMIDE AND ALBUTEROL 2 PUFF: 20; 100 SPRAY, METERED RESPIRATORY (INHALATION) at 16:18

## 2021-01-01 RX ADMIN — BISACODYL 10 MG: 10 SUPPOSITORY RECTAL at 17:55

## 2021-01-01 RX ADMIN — INSULIN ASPART 1 UNITS: 100 INJECTION, SOLUTION INTRAVENOUS; SUBCUTANEOUS at 00:20

## 2021-01-01 RX ADMIN — CHLORHEXIDINE GLUCONATE 15 ML: 1.2 SOLUTION ORAL at 22:38

## 2021-01-01 RX ADMIN — PROPOFOL 20 MCG/KG/MIN: 10 INJECTION, EMULSION INTRAVENOUS at 22:32

## 2021-01-01 RX ADMIN — SODIUM CHLORIDE 50 ML: 9 INJECTION, SOLUTION INTRAVENOUS at 03:44

## 2021-01-01 RX ADMIN — DEXAMETHASONE SODIUM PHOSPHATE 6 MG: 10 INJECTION INTRAMUSCULAR; INTRAVENOUS at 13:17

## 2021-01-01 RX ADMIN — BARICITINIB 2 MG: 2 TABLET, FILM COATED ORAL at 08:21

## 2021-01-01 RX ADMIN — FAMOTIDINE 20 MG: 10 INJECTION, SOLUTION INTRAVENOUS at 10:03

## 2021-01-01 RX ADMIN — CEFAZOLIN SODIUM 1 G: 1 INJECTION, SOLUTION INTRAVENOUS at 03:46

## 2021-01-01 RX ADMIN — IPRATROPIUM BROMIDE AND ALBUTEROL 2 PUFF: 20; 100 SPRAY, METERED RESPIRATORY (INHALATION) at 12:05

## 2021-01-01 RX ADMIN — Medication 0.1 MCG/KG/MIN: at 12:27

## 2021-01-01 RX ADMIN — LACTULOSE 20 G: 20 SOLUTION ORAL at 20:04

## 2021-01-01 RX ADMIN — EPOPROSTENOL 20 NG/KG/MIN: 1.5 INJECTION, POWDER, LYOPHILIZED, FOR SOLUTION INTRAVENOUS at 05:51

## 2021-01-01 RX ADMIN — TAZOBACTAM SODIUM AND PIPERACILLIN SODIUM 4.5 G: 500; 4 INJECTION, SOLUTION INTRAVENOUS at 03:39

## 2021-01-01 RX ADMIN — Medication 1 PACKET: at 22:12

## 2021-01-01 RX ADMIN — LACTULOSE 20 G: 20 SOLUTION ORAL at 11:35

## 2021-01-01 RX ADMIN — DEXAMETHASONE SODIUM PHOSPHATE 6 MG: 10 INJECTION INTRAMUSCULAR; INTRAVENOUS at 16:17

## 2021-01-01 RX ADMIN — CHLORHEXIDINE GLUCONATE 15 ML: 1.2 SOLUTION ORAL at 08:13

## 2021-01-01 RX ADMIN — BARICITINIB 2 MG: 2 TABLET, FILM COATED ORAL at 08:29

## 2021-01-01 RX ADMIN — EPOPROSTENOL 20 NG/KG/MIN: 1.5 INJECTION, POWDER, LYOPHILIZED, FOR SOLUTION INTRAVENOUS at 12:54

## 2021-01-01 RX ADMIN — Medication 1 PACKET: at 09:23

## 2021-01-01 RX ADMIN — EPOPROSTENOL 20 NG/KG/MIN: 1.5 INJECTION, POWDER, LYOPHILIZED, FOR SOLUTION INTRAVENOUS at 16:50

## 2021-01-01 RX ADMIN — LEVOTHYROXINE SODIUM 75 MCG: 0.07 TABLET ORAL at 08:28

## 2021-01-01 RX ADMIN — FAMOTIDINE 20 MG: 10 INJECTION, SOLUTION INTRAVENOUS at 10:24

## 2021-01-01 RX ADMIN — Medication 0.15 MCG/KG/MIN: at 08:51

## 2021-01-01 RX ADMIN — ENOXAPARIN SODIUM 40 MG: 40 INJECTION SUBCUTANEOUS at 11:32

## 2021-01-01 RX ADMIN — Medication 200 MCG/HR: at 09:22

## 2021-01-01 RX ADMIN — PROPOFOL 75 MCG/KG/MIN: 10 INJECTION, EMULSION INTRAVENOUS at 17:24

## 2021-01-01 RX ADMIN — MINERAL OIL AND PETROLATUM 3.5 G: 150; 830 OINTMENT OPHTHALMIC at 06:14

## 2021-01-01 RX ADMIN — PROPOFOL 75 MCG/KG/MIN: 10 INJECTION, EMULSION INTRAVENOUS at 12:03

## 2021-01-01 RX ADMIN — SODIUM CHLORIDE, POTASSIUM CHLORIDE, SODIUM LACTATE AND CALCIUM CHLORIDE: 600; 310; 30; 20 INJECTION, SOLUTION INTRAVENOUS at 11:50

## 2021-01-01 RX ADMIN — SENNOSIDES A AND B 15 ML: 8.8 SYRUP ORAL at 08:12

## 2021-01-01 RX ADMIN — DEXAMETHASONE SODIUM PHOSPHATE 6 MG: 10 INJECTION INTRAMUSCULAR; INTRAVENOUS at 15:01

## 2021-01-01 RX ADMIN — Medication 1 PACKET: at 21:46

## 2021-01-01 RX ADMIN — MIDAZOLAM 2 MG: 1 INJECTION INTRAMUSCULAR; INTRAVENOUS at 00:23

## 2021-01-01 RX ADMIN — PROPOFOL 30 MCG/KG/MIN: 10 INJECTION, EMULSION INTRAVENOUS at 06:27

## 2021-01-01 RX ADMIN — CEFAZOLIN SODIUM 1 G: 1 INJECTION, SOLUTION INTRAVENOUS at 11:14

## 2021-01-01 RX ADMIN — EPOPROSTENOL 20 NG/KG/MIN: 1.5 INJECTION, POWDER, LYOPHILIZED, FOR SOLUTION INTRAVENOUS at 23:39

## 2021-01-01 RX ADMIN — LEVOTHYROXINE SODIUM 75 MCG: 0.07 TABLET ORAL at 10:04

## 2021-01-01 RX ADMIN — INSULIN ASPART 1 UNITS: 100 INJECTION, SOLUTION INTRAVENOUS; SUBCUTANEOUS at 16:26

## 2021-01-01 RX ADMIN — SIMVASTATIN 40 MG: 40 TABLET, FILM COATED ORAL at 20:15

## 2021-01-01 RX ADMIN — CEFAZOLIN SODIUM 1 G: 1 INJECTION, SOLUTION INTRAVENOUS at 10:53

## 2021-01-01 RX ADMIN — PROPOFOL 30 MCG/KG/MIN: 10 INJECTION, EMULSION INTRAVENOUS at 19:13

## 2021-01-01 RX ADMIN — SIMVASTATIN 40 MG: 40 TABLET, FILM COATED ORAL at 21:07

## 2021-01-01 RX ADMIN — FAMOTIDINE 20 MG: 10 INJECTION, SOLUTION INTRAVENOUS at 09:07

## 2021-01-01 RX ADMIN — Medication 1 PACKET: at 17:00

## 2021-01-01 RX ADMIN — ACETAMINOPHEN 650 MG: 325 TABLET, FILM COATED ORAL at 04:42

## 2021-01-01 RX ADMIN — EPOPROSTENOL 20 NG/KG/MIN: 1.5 INJECTION, POWDER, LYOPHILIZED, FOR SOLUTION INTRAVENOUS at 21:45

## 2021-01-01 RX ADMIN — Medication 1 PACKET: at 21:00

## 2021-01-01 RX ADMIN — Medication 1 PACKET: at 08:46

## 2021-01-01 RX ADMIN — PROPOFOL 30 MCG/KG/MIN: 10 INJECTION, EMULSION INTRAVENOUS at 19:55

## 2021-01-01 RX ADMIN — Medication 200 MCG/HR: at 00:23

## 2021-01-01 RX ADMIN — ENOXAPARIN SODIUM 40 MG: 40 INJECTION SUBCUTANEOUS at 08:15

## 2021-01-01 RX ADMIN — ENOXAPARIN SODIUM 40 MG: 40 INJECTION SUBCUTANEOUS at 10:02

## 2021-01-01 RX ADMIN — SODIUM CHLORIDE 1000 ML: 9 INJECTION, SOLUTION INTRAVENOUS at 18:25

## 2021-01-01 RX ADMIN — EPOPROSTENOL 20 NG/KG/MIN: 1.5 INJECTION, POWDER, LYOPHILIZED, FOR SOLUTION INTRAVENOUS at 16:29

## 2021-01-01 RX ADMIN — LORAZEPAM 1 MG: 2 INJECTION INTRAMUSCULAR; INTRAVENOUS at 17:34

## 2021-01-01 RX ADMIN — ENOXAPARIN SODIUM 40 MG: 40 INJECTION SUBCUTANEOUS at 08:49

## 2021-01-01 RX ADMIN — EPOPROSTENOL 20 NG/KG/MIN: 1.5 INJECTION, POWDER, LYOPHILIZED, FOR SOLUTION INTRAVENOUS at 04:55

## 2021-01-01 RX ADMIN — Medication 1 PACKET: at 15:25

## 2021-01-01 RX ADMIN — SODIUM CHLORIDE 50 ML: 9 INJECTION, SOLUTION INTRAVENOUS at 16:38

## 2021-01-01 RX ADMIN — Medication 1 PACKET: at 15:55

## 2021-01-01 RX ADMIN — DEXAMETHASONE SODIUM PHOSPHATE 12 MG: 10 INJECTION INTRAMUSCULAR; INTRAVENOUS at 13:26

## 2021-01-01 RX ADMIN — TAZOBACTAM SODIUM AND PIPERACILLIN SODIUM 4.5 G: 500; 4 INJECTION, SOLUTION INTRAVENOUS at 09:29

## 2021-01-01 RX ADMIN — REMDESIVIR 100 MG: 100 INJECTION, POWDER, LYOPHILIZED, FOR SOLUTION INTRAVENOUS at 18:42

## 2021-01-01 RX ADMIN — PROPOFOL 75 MCG/KG/MIN: 10 INJECTION, EMULSION INTRAVENOUS at 08:49

## 2021-01-01 RX ADMIN — Medication 0.06 MCG/KG/MIN: at 15:53

## 2021-01-01 RX ADMIN — MINERAL OIL AND PETROLATUM 3.5 G: 150; 830 OINTMENT OPHTHALMIC at 06:34

## 2021-01-01 RX ADMIN — SENNOSIDES A AND B 15 ML: 8.8 SYRUP ORAL at 07:48

## 2021-01-01 RX ADMIN — MEROPENEM 1 G: 1 INJECTION, POWDER, FOR SOLUTION INTRAVENOUS at 21:08

## 2021-01-01 RX ADMIN — SENNOSIDES 15 ML: 8.8 LIQUID ORAL at 08:13

## 2021-01-01 RX ADMIN — ENOXAPARIN SODIUM 40 MG: 40 INJECTION SUBCUTANEOUS at 08:41

## 2021-01-01 RX ADMIN — Medication 20 MG: at 15:25

## 2021-01-01 RX ADMIN — Medication 0.11 MCG/KG/MIN: at 22:17

## 2021-01-01 RX ADMIN — Medication 0.07 MCG/KG/MIN: at 08:55

## 2021-01-01 RX ADMIN — Medication 1 PACKET: at 22:07

## 2021-01-01 RX ADMIN — PROPOFOL 75 MCG/KG/MIN: 10 INJECTION, EMULSION INTRAVENOUS at 20:09

## 2021-01-01 RX ADMIN — LISINOPRIL 20 MG: 20 TABLET ORAL at 10:02

## 2021-01-01 RX ADMIN — PANTOPRAZOLE SODIUM 40 MG: 40 INJECTION, POWDER, FOR SOLUTION INTRAVENOUS at 08:34

## 2021-01-01 RX ADMIN — ASPIRIN 81 MG: 81 TABLET, COATED ORAL at 11:10

## 2021-01-01 RX ADMIN — INSULIN ASPART 1 UNITS: 100 INJECTION, SOLUTION INTRAVENOUS; SUBCUTANEOUS at 00:31

## 2021-01-01 RX ADMIN — DEXAMETHASONE SODIUM PHOSPHATE 6 MG: 10 INJECTION INTRAMUSCULAR; INTRAVENOUS at 21:06

## 2021-01-01 RX ADMIN — LACTULOSE 20 G: 20 SOLUTION ORAL at 21:25

## 2021-01-01 RX ADMIN — EPOPROSTENOL 20 NG/KG/MIN: 1.5 INJECTION, POWDER, LYOPHILIZED, FOR SOLUTION INTRAVENOUS at 23:14

## 2021-01-01 RX ADMIN — Medication 100 MCG/HR: at 05:06

## 2021-01-01 RX ADMIN — DEXTROSE MONOHYDRATE 25 ML: 25 INJECTION, SOLUTION INTRAVENOUS at 13:23

## 2021-01-01 RX ADMIN — PROPOFOL 40 MCG/KG/MIN: 10 INJECTION, EMULSION INTRAVENOUS at 15:37

## 2021-01-01 RX ADMIN — LEVOTHYROXINE SODIUM 75 MCG: 0.07 TABLET ORAL at 09:02

## 2021-01-01 RX ADMIN — DOCUSATE SODIUM 100 MG: 50 LIQUID ORAL at 10:55

## 2021-01-01 RX ADMIN — BARICITINIB 2 MG: 2 TABLET, FILM COATED ORAL at 08:49

## 2021-01-01 RX ADMIN — PANTOPRAZOLE SODIUM 40 MG: 40 INJECTION, POWDER, FOR SOLUTION INTRAVENOUS at 08:12

## 2021-01-01 RX ADMIN — SENNOSIDES 15 ML: 8.8 LIQUID ORAL at 20:07

## 2021-01-01 RX ADMIN — SODIUM BICARBONATE 50 MEQ: 84 INJECTION INTRAVENOUS at 19:30

## 2021-01-01 RX ADMIN — BARICITINIB 4 MG: 2 TABLET, FILM COATED ORAL at 09:06

## 2021-01-01 RX ADMIN — LEVOTHYROXINE SODIUM 75 MCG: 0.07 TABLET ORAL at 08:41

## 2021-01-01 RX ADMIN — MINERAL OIL AND PETROLATUM 3.5 G: 150; 830 OINTMENT OPHTHALMIC at 22:38

## 2021-01-01 RX ADMIN — BARICITINIB 2 MG: 2 TABLET, FILM COATED ORAL at 08:43

## 2021-01-01 RX ADMIN — SODIUM CHLORIDE 86 ML: 9 INJECTION, SOLUTION INTRAVENOUS at 19:37

## 2021-01-01 RX ADMIN — DOCUSATE SODIUM 100 MG: 50 LIQUID ORAL at 09:22

## 2021-01-01 RX ADMIN — INSULIN ASPART 1 UNITS: 100 INJECTION, SOLUTION INTRAVENOUS; SUBCUTANEOUS at 22:38

## 2021-01-01 RX ADMIN — SMOFLIPID 250 ML: 6; 6; 5; 3 INJECTION, EMULSION INTRAVENOUS at 20:32

## 2021-01-01 RX ADMIN — CEFAZOLIN SODIUM 1 G: 1 INJECTION, SOLUTION INTRAVENOUS at 11:32

## 2021-01-01 RX ADMIN — POTASSIUM CHLORIDE: 2 INJECTION, SOLUTION, CONCENTRATE INTRAVENOUS at 21:22

## 2021-01-01 RX ADMIN — Medication 0.03 MCG/KG/MIN: at 00:01

## 2021-01-01 RX ADMIN — DEXAMETHASONE SODIUM PHOSPHATE 6 MG: 10 INJECTION, SOLUTION INTRAMUSCULAR; INTRAVENOUS at 14:20

## 2021-01-01 RX ADMIN — PROPOFOL 65 MCG/KG/MIN: 10 INJECTION, EMULSION INTRAVENOUS at 11:13

## 2021-01-01 RX ADMIN — SIMVASTATIN 40 MG: 40 TABLET, FILM COATED ORAL at 21:37

## 2021-01-01 RX ADMIN — FLUOXETINE 60 MG: 20 CAPSULE ORAL at 10:03

## 2021-01-01 RX ADMIN — ENOXAPARIN SODIUM 40 MG: 40 INJECTION SUBCUTANEOUS at 22:39

## 2021-01-01 RX ADMIN — MEROPENEM 1 G: 1 INJECTION, POWDER, FOR SOLUTION INTRAVENOUS at 21:37

## 2021-01-01 RX ADMIN — CEFAZOLIN SODIUM 1 G: 1 INJECTION, SOLUTION INTRAVENOUS at 01:56

## 2021-01-01 RX ADMIN — PROPOFOL 75 MCG/KG/MIN: 10 INJECTION, EMULSION INTRAVENOUS at 14:44

## 2021-01-01 RX ADMIN — LEVOTHYROXINE SODIUM 75 MCG: 0.07 TABLET ORAL at 11:32

## 2021-01-01 RX ADMIN — INSULIN ASPART 1 UNITS: 100 INJECTION, SOLUTION INTRAVENOUS; SUBCUTANEOUS at 16:05

## 2021-01-01 RX ADMIN — HYDROMORPHONE HYDROCHLORIDE 0.3 MG: 1 INJECTION, SOLUTION INTRAMUSCULAR; INTRAVENOUS; SUBCUTANEOUS at 04:52

## 2021-01-01 RX ADMIN — Medication 1 PACKET: at 08:35

## 2021-01-01 RX ADMIN — FAMOTIDINE 20 MG: 10 INJECTION, SOLUTION INTRAVENOUS at 08:56

## 2021-01-01 RX ADMIN — Medication 1 PACKET: at 16:26

## 2021-01-01 RX ADMIN — ENOXAPARIN SODIUM 40 MG: 40 INJECTION SUBCUTANEOUS at 21:46

## 2021-01-01 RX ADMIN — CEFAZOLIN SODIUM 1 G: 1 INJECTION, SOLUTION INTRAVENOUS at 18:54

## 2021-01-01 RX ADMIN — PROPOFOL 30 MCG/KG/MIN: 10 INJECTION, EMULSION INTRAVENOUS at 12:17

## 2021-01-01 RX ADMIN — SIMVASTATIN 40 MG: 40 TABLET, FILM COATED ORAL at 01:00

## 2021-01-01 RX ADMIN — Medication 100 MCG/HR: at 05:10

## 2021-01-01 RX ADMIN — Medication 0.01 MCG/KG/MIN: at 00:34

## 2021-01-01 RX ADMIN — ACETAMINOPHEN 650 MG: 325 TABLET, FILM COATED ORAL at 13:05

## 2021-01-01 RX ADMIN — LEVOTHYROXINE SODIUM 75 MCG: 0.07 TABLET ORAL at 10:01

## 2021-01-01 RX ADMIN — PROPOFOL 60 MCG/KG/MIN: 10 INJECTION, EMULSION INTRAVENOUS at 13:25

## 2021-01-01 RX ADMIN — PROPOFOL 40 MCG/KG/MIN: 10 INJECTION, EMULSION INTRAVENOUS at 11:37

## 2021-01-01 RX ADMIN — ENOXAPARIN SODIUM 40 MG: 40 INJECTION SUBCUTANEOUS at 10:53

## 2021-01-01 RX ADMIN — PROPOFOL 80 MG: 10 INJECTION, EMULSION INTRAVENOUS at 23:22

## 2021-01-01 RX ADMIN — DOCUSATE SODIUM 100 MG: 50 LIQUID ORAL at 20:04

## 2021-01-01 RX ADMIN — EPOPROSTENOL 20 NG/KG/MIN: 1.5 INJECTION, POWDER, LYOPHILIZED, FOR SOLUTION INTRAVENOUS at 07:46

## 2021-01-01 RX ADMIN — MEROPENEM 1 G: 1 INJECTION, POWDER, FOR SOLUTION INTRAVENOUS at 08:16

## 2021-01-01 RX ADMIN — INSULIN ASPART 2 UNITS: 100 INJECTION, SOLUTION INTRAVENOUS; SUBCUTANEOUS at 00:24

## 2021-01-01 RX ADMIN — BARICITINIB 2 MG: 2 TABLET, FILM COATED ORAL at 10:01

## 2021-01-01 RX ADMIN — DEXAMETHASONE SODIUM PHOSPHATE 6 MG: 10 INJECTION INTRAMUSCULAR; INTRAVENOUS at 14:01

## 2021-01-01 RX ADMIN — BARICITINIB 2 MG: 2 TABLET, FILM COATED ORAL at 10:03

## 2021-01-01 RX ADMIN — DEXAMETHASONE SODIUM PHOSPHATE 12 MG: 10 INJECTION INTRAMUSCULAR; INTRAVENOUS at 12:02

## 2021-01-01 RX ADMIN — DEXAMETHASONE SODIUM PHOSPHATE 12 MG: 10 INJECTION INTRAMUSCULAR; INTRAVENOUS at 12:22

## 2021-01-01 RX ADMIN — EPOPROSTENOL 20 NG/KG/MIN: 1.5 INJECTION, POWDER, LYOPHILIZED, FOR SOLUTION INTRAVENOUS at 05:52

## 2021-01-01 RX ADMIN — PHENYLEPHRINE HYDROCHLORIDE 100 MCG: 10 INJECTION INTRAVENOUS at 23:22

## 2021-01-01 RX ADMIN — EPOPROSTENOL 20 NG/KG/MIN: 1.5 INJECTION, POWDER, LYOPHILIZED, FOR SOLUTION INTRAVENOUS at 01:02

## 2021-01-01 RX ADMIN — FLUOXETINE 60 MG: 20 CAPSULE ORAL at 08:49

## 2021-01-01 RX ADMIN — PANTOPRAZOLE SODIUM 40 MG: 40 INJECTION, POWDER, FOR SOLUTION INTRAVENOUS at 08:21

## 2021-01-01 RX ADMIN — Medication 0.18 MCG/KG/MIN: at 15:36

## 2021-01-01 RX ADMIN — PROPOFOL 25 MCG/KG/MIN: 10 INJECTION, EMULSION INTRAVENOUS at 03:16

## 2021-01-01 RX ADMIN — DOCUSATE SODIUM 100 MG: 50 LIQUID ORAL at 22:38

## 2021-01-01 RX ADMIN — PROPOFOL 25 MCG/KG/MIN: 10 INJECTION, EMULSION INTRAVENOUS at 19:49

## 2021-01-01 RX ADMIN — ENOXAPARIN SODIUM 40 MG: 40 INJECTION SUBCUTANEOUS at 21:06

## 2021-01-01 RX ADMIN — CALCIUM GLUCONATE 1 G: 98 INJECTION, SOLUTION INTRAVENOUS at 19:35

## 2021-01-01 RX ADMIN — PANTOPRAZOLE SODIUM 40 MG: 40 INJECTION, POWDER, FOR SOLUTION INTRAVENOUS at 08:50

## 2021-01-01 RX ADMIN — Medication 0.17 MCG/KG/MIN: at 16:53

## 2021-01-01 RX ADMIN — ENOXAPARIN SODIUM 40 MG: 40 INJECTION SUBCUTANEOUS at 10:55

## 2021-01-01 RX ADMIN — SENNOSIDES 15 ML: 8.8 LIQUID ORAL at 20:04

## 2021-01-01 RX ADMIN — IPRATROPIUM BROMIDE AND ALBUTEROL 2 PUFF: 20; 100 SPRAY, METERED RESPIRATORY (INHALATION) at 16:22

## 2021-01-01 RX ADMIN — INSULIN ASPART 1 UNITS: 100 INJECTION, SOLUTION INTRAVENOUS; SUBCUTANEOUS at 20:58

## 2021-01-01 RX ADMIN — IPRATROPIUM BROMIDE AND ALBUTEROL 2 PUFF: 20; 100 SPRAY, METERED RESPIRATORY (INHALATION) at 15:40

## 2021-01-01 RX ADMIN — MINERAL OIL AND PETROLATUM 3.5 G: 150; 830 OINTMENT OPHTHALMIC at 16:27

## 2021-01-01 RX ADMIN — MEROPENEM 1 G: 1 INJECTION, POWDER, FOR SOLUTION INTRAVENOUS at 10:04

## 2021-01-01 RX ADMIN — PROPOFOL 75 MCG/KG/MIN: 10 INJECTION, EMULSION INTRAVENOUS at 08:50

## 2021-01-01 RX ADMIN — ENOXAPARIN SODIUM 40 MG: 40 INJECTION SUBCUTANEOUS at 09:29

## 2021-01-01 RX ADMIN — MINERAL OIL AND PETROLATUM 3.5 G: 150; 830 OINTMENT OPHTHALMIC at 08:13

## 2021-01-01 RX ADMIN — DEXTROSE AND SODIUM CHLORIDE: 5; 900 INJECTION, SOLUTION INTRAVENOUS at 13:29

## 2021-01-01 RX ADMIN — BARICITINIB 2 MG: 2 TABLET, FILM COATED ORAL at 07:48

## 2021-01-01 RX ADMIN — ONDANSETRON 4 MG: 2 INJECTION INTRAMUSCULAR; INTRAVENOUS at 12:27

## 2021-01-01 RX ADMIN — ENOXAPARIN SODIUM 40 MG: 40 INJECTION SUBCUTANEOUS at 09:03

## 2021-01-01 RX ADMIN — POTASSIUM CHLORIDE: 2 INJECTION, SOLUTION, CONCENTRATE INTRAVENOUS at 20:20

## 2021-01-01 RX ADMIN — PROPOFOL 55 MCG/KG/MIN: 10 INJECTION, EMULSION INTRAVENOUS at 17:30

## 2021-01-01 RX ADMIN — LACTULOSE 20 G: 20 SOLUTION ORAL at 08:51

## 2021-01-01 RX ADMIN — INSULIN ASPART 2 UNITS: 100 INJECTION, SOLUTION INTRAVENOUS; SUBCUTANEOUS at 20:14

## 2021-01-01 RX ADMIN — ENOXAPARIN SODIUM 40 MG: 40 INJECTION SUBCUTANEOUS at 11:01

## 2021-01-01 RX ADMIN — FAMOTIDINE 20 MG: 10 INJECTION, SOLUTION INTRAVENOUS at 08:41

## 2021-01-01 RX ADMIN — FAMOTIDINE 20 MG: 10 INJECTION, SOLUTION INTRAVENOUS at 08:17

## 2021-01-01 RX ADMIN — IPRATROPIUM BROMIDE AND ALBUTEROL 2 PUFF: 20; 100 SPRAY, METERED RESPIRATORY (INHALATION) at 13:15

## 2021-01-01 RX ADMIN — EPOPROSTENOL 20 NG/KG/MIN: 1.5 INJECTION, POWDER, LYOPHILIZED, FOR SOLUTION INTRAVENOUS at 02:35

## 2021-01-01 RX ADMIN — PROPOFOL 75 MCG/KG/MIN: 10 INJECTION, EMULSION INTRAVENOUS at 06:20

## 2021-01-01 RX ADMIN — FAMOTIDINE 20 MG: 10 INJECTION, SOLUTION INTRAVENOUS at 08:49

## 2021-01-01 RX ADMIN — PROPOFOL 75 MCG/KG/MIN: 10 INJECTION, EMULSION INTRAVENOUS at 00:58

## 2021-01-01 RX ADMIN — REMDESIVIR 100 MG: 100 INJECTION, POWDER, LYOPHILIZED, FOR SOLUTION INTRAVENOUS at 18:12

## 2021-01-01 RX ADMIN — PROPOFOL 75 MCG/KG/MIN: 10 INJECTION, EMULSION INTRAVENOUS at 22:31

## 2021-01-01 RX ADMIN — EPOPROSTENOL 20 NG/KG/MIN: 1.5 INJECTION, POWDER, LYOPHILIZED, FOR SOLUTION INTRAVENOUS at 08:13

## 2021-01-01 RX ADMIN — ENOXAPARIN SODIUM 40 MG: 40 INJECTION SUBCUTANEOUS at 09:22

## 2021-01-01 RX ADMIN — SENNOSIDES AND DOCUSATE SODIUM 2 TABLET: 50; 8.6 TABLET ORAL at 00:43

## 2021-01-01 RX ADMIN — IPRATROPIUM BROMIDE AND ALBUTEROL 2 PUFF: 20; 100 SPRAY, METERED RESPIRATORY (INHALATION) at 08:43

## 2021-01-01 RX ADMIN — IPRATROPIUM BROMIDE AND ALBUTEROL 2 PUFF: 20; 100 SPRAY, METERED RESPIRATORY (INHALATION) at 19:59

## 2021-01-01 RX ADMIN — DEXAMETHASONE SODIUM PHOSPHATE 6 MG: 10 INJECTION INTRAMUSCULAR; INTRAVENOUS at 13:05

## 2021-01-01 RX ADMIN — ENOXAPARIN SODIUM 40 MG: 40 INJECTION SUBCUTANEOUS at 09:07

## 2021-01-01 RX ADMIN — INSULIN ASPART 1 UNITS: 100 INJECTION, SOLUTION INTRAVENOUS; SUBCUTANEOUS at 19:53

## 2021-01-01 RX ADMIN — INSULIN ASPART 1 UNITS: 100 INJECTION, SOLUTION INTRAVENOUS; SUBCUTANEOUS at 01:00

## 2021-01-01 RX ADMIN — ASPIRIN 81 MG: 81 TABLET, COATED ORAL at 10:01

## 2021-01-01 RX ADMIN — INSULIN ASPART 1 UNITS: 100 INJECTION, SOLUTION INTRAVENOUS; SUBCUTANEOUS at 20:38

## 2021-01-01 RX ADMIN — LISINOPRIL 20 MG: 20 TABLET ORAL at 08:29

## 2021-01-01 RX ADMIN — PROPOFOL 75 MCG/KG/MIN: 10 INJECTION, EMULSION INTRAVENOUS at 06:10

## 2021-01-01 RX ADMIN — DOCUSATE SODIUM 100 MG: 50 LIQUID ORAL at 08:51

## 2021-01-01 RX ADMIN — REMDESIVIR 100 MG: 100 INJECTION, POWDER, LYOPHILIZED, FOR SOLUTION INTRAVENOUS at 16:01

## 2021-01-01 RX ADMIN — LACTULOSE 20 G: 20 SOLUTION ORAL at 21:06

## 2021-01-01 RX ADMIN — IPRATROPIUM BROMIDE AND ALBUTEROL 2 PUFF: 20; 100 SPRAY, METERED RESPIRATORY (INHALATION) at 11:52

## 2021-01-01 RX ADMIN — Medication 100 MCG/HR: at 23:48

## 2021-01-01 RX ADMIN — SODIUM CHLORIDE 50 ML: 9 INJECTION, SOLUTION INTRAVENOUS at 18:13

## 2021-01-01 RX ADMIN — REMDESIVIR 200 MG: 100 INJECTION, POWDER, LYOPHILIZED, FOR SOLUTION INTRAVENOUS at 03:09

## 2021-01-01 RX ADMIN — INSULIN ASPART 1 UNITS: 100 INJECTION, SOLUTION INTRAVENOUS; SUBCUTANEOUS at 09:06

## 2021-01-01 RX ADMIN — FAMOTIDINE 20 MG: 10 INJECTION, SOLUTION INTRAVENOUS at 08:45

## 2021-01-01 RX ADMIN — EPOPROSTENOL 20 NG/KG/MIN: 1.5 INJECTION, POWDER, LYOPHILIZED, FOR SOLUTION INTRAVENOUS at 21:07

## 2021-01-01 RX ADMIN — PROPOFOL 75 MCG/KG/MIN: 10 INJECTION, EMULSION INTRAVENOUS at 01:07

## 2021-01-01 RX ADMIN — FLUOXETINE 60 MG: 20 CAPSULE ORAL at 08:40

## 2021-01-01 RX ADMIN — LACTULOSE 20 G: 20 SOLUTION ORAL at 08:46

## 2021-01-01 RX ADMIN — DEXAMETHASONE SODIUM PHOSPHATE 12 MG: 10 INJECTION INTRAMUSCULAR; INTRAVENOUS at 14:35

## 2021-01-01 RX ADMIN — PROPOFOL 75 MCG/KG/MIN: 10 INJECTION, EMULSION INTRAVENOUS at 03:32

## 2021-01-01 RX ADMIN — GLYCOPYRROLATE 0.2 MG: 0.2 INJECTION, SOLUTION INTRAMUSCULAR; INTRAVENOUS at 16:26

## 2021-01-01 RX ADMIN — SENNOSIDES A AND B 15 ML: 8.8 SYRUP ORAL at 08:34

## 2021-01-01 RX ADMIN — PROPOFOL 20 MCG/KG/MIN: 10 INJECTION, EMULSION INTRAVENOUS at 06:27

## 2021-01-01 RX ADMIN — SODIUM CHLORIDE 50 ML: 9 INJECTION, SOLUTION INTRAVENOUS at 20:05

## 2021-01-01 RX ADMIN — ASPIRIN 81 MG: 81 TABLET, COATED ORAL at 08:49

## 2021-01-01 RX ADMIN — IOPAMIDOL 67 ML: 755 INJECTION, SOLUTION INTRAVENOUS at 21:06

## 2021-01-01 RX ADMIN — ENOXAPARIN SODIUM 40 MG: 40 INJECTION SUBCUTANEOUS at 21:25

## 2021-01-01 RX ADMIN — IPRATROPIUM BROMIDE AND ALBUTEROL 2 PUFF: 20; 100 SPRAY, METERED RESPIRATORY (INHALATION) at 11:50

## 2021-01-01 RX ADMIN — PANTOPRAZOLE SODIUM 40 MG: 40 INJECTION, POWDER, FOR SOLUTION INTRAVENOUS at 07:48

## 2021-01-01 RX ADMIN — PROPOFOL 35 MCG/KG/MIN: 10 INJECTION, EMULSION INTRAVENOUS at 08:45

## 2021-01-01 RX ADMIN — IPRATROPIUM BROMIDE AND ALBUTEROL 2 PUFF: 20; 100 SPRAY, METERED RESPIRATORY (INHALATION) at 13:44

## 2021-01-01 RX ADMIN — IPRATROPIUM BROMIDE AND ALBUTEROL 2 PUFF: 20; 100 SPRAY, METERED RESPIRATORY (INHALATION) at 08:42

## 2021-01-01 RX ADMIN — FAMOTIDINE 20 MG: 10 INJECTION, SOLUTION INTRAVENOUS at 12:33

## 2021-01-01 RX ADMIN — PROPOFOL 75 MCG/KG/MIN: 10 INJECTION, EMULSION INTRAVENOUS at 22:21

## 2021-01-01 RX ADMIN — INSULIN ASPART 1 UNITS: 100 INJECTION, SOLUTION INTRAVENOUS; SUBCUTANEOUS at 01:34

## 2021-01-01 RX ADMIN — CHLORHEXIDINE GLUCONATE 15 ML: 1.2 SOLUTION ORAL at 09:22

## 2021-01-01 RX ADMIN — CEFAZOLIN SODIUM 1 G: 1 INJECTION, SOLUTION INTRAVENOUS at 18:20

## 2021-01-01 RX ADMIN — INSULIN ASPART 1 UNITS: 100 INJECTION, SOLUTION INTRAVENOUS; SUBCUTANEOUS at 19:56

## 2021-01-01 RX ADMIN — Medication 200 MCG/HR: at 04:27

## 2021-01-01 RX ADMIN — PROPOFOL 40 MCG/KG/MIN: 10 INJECTION, EMULSION INTRAVENOUS at 06:00

## 2021-01-01 RX ADMIN — MEROPENEM 1 G: 1 INJECTION, POWDER, FOR SOLUTION INTRAVENOUS at 20:19

## 2021-01-01 RX ADMIN — Medication 0.06 MCG/KG/MIN: at 04:21

## 2021-01-01 RX ADMIN — LACTULOSE 20 G: 20 SOLUTION ORAL at 20:06

## 2021-01-01 RX ADMIN — IPRATROPIUM BROMIDE AND ALBUTEROL 2 PUFF: 20; 100 SPRAY, METERED RESPIRATORY (INHALATION) at 15:51

## 2021-01-01 RX ADMIN — ENOXAPARIN SODIUM 40 MG: 40 INJECTION SUBCUTANEOUS at 22:06

## 2021-01-01 RX ADMIN — FLUOXETINE 60 MG: 20 CAPSULE ORAL at 10:01

## 2021-01-01 RX ADMIN — SODIUM CHLORIDE, POTASSIUM CHLORIDE, SODIUM LACTATE AND CALCIUM CHLORIDE: 600; 310; 30; 20 INJECTION, SOLUTION INTRAVENOUS at 10:03

## 2021-01-01 RX ADMIN — EPOPROSTENOL 20 NG/KG/MIN: 1.5 INJECTION, POWDER, LYOPHILIZED, FOR SOLUTION INTRAVENOUS at 18:37

## 2021-01-01 RX ADMIN — BARICITINIB 2 MG: 2 TABLET, FILM COATED ORAL at 09:02

## 2021-01-01 RX ADMIN — ONDANSETRON 4 MG: 2 INJECTION INTRAMUSCULAR; INTRAVENOUS at 12:53

## 2021-01-01 RX ADMIN — ENOXAPARIN SODIUM 40 MG: 40 INJECTION SUBCUTANEOUS at 22:07

## 2021-01-01 RX ADMIN — Medication 0.15 MCG/KG/MIN: at 02:51

## 2021-01-01 RX ADMIN — Medication 200 MCG/HR: at 12:22

## 2021-01-01 RX ADMIN — INSULIN ASPART 2 UNITS: 100 INJECTION, SOLUTION INTRAVENOUS; SUBCUTANEOUS at 04:36

## 2021-01-01 RX ADMIN — BARICITINIB 4 MG: 2 TABLET, FILM COATED ORAL at 08:41

## 2021-01-01 RX ADMIN — DEXAMETHASONE SODIUM PHOSPHATE 6 MG: 10 INJECTION INTRAMUSCULAR; INTRAVENOUS at 13:19

## 2021-01-01 RX ADMIN — IPRATROPIUM BROMIDE AND ALBUTEROL 2 PUFF: 20; 100 SPRAY, METERED RESPIRATORY (INHALATION) at 11:13

## 2021-01-01 RX ADMIN — EPOPROSTENOL 20 NG/KG/MIN: 1.5 INJECTION, POWDER, LYOPHILIZED, FOR SOLUTION INTRAVENOUS at 08:18

## 2021-01-01 RX ADMIN — Medication 1 PACKET: at 16:06

## 2021-01-01 RX ADMIN — PROPOFOL 20 MCG/KG/MIN: 10 INJECTION, EMULSION INTRAVENOUS at 14:21

## 2021-01-01 RX ADMIN — MEROPENEM 1 G: 1 INJECTION, POWDER, FOR SOLUTION INTRAVENOUS at 10:20

## 2021-01-01 RX ADMIN — FAMOTIDINE 20 MG: 10 INJECTION, SOLUTION INTRAVENOUS at 08:29

## 2021-01-01 RX ADMIN — Medication 0.04 MCG/KG/MIN: at 06:09

## 2021-01-01 RX ADMIN — Medication 0.03 MCG/KG/MIN: at 21:40

## 2021-01-01 RX ADMIN — EPOPROSTENOL 20 NG/KG/MIN: 1.5 INJECTION, POWDER, LYOPHILIZED, FOR SOLUTION INTRAVENOUS at 12:25

## 2021-01-01 RX ADMIN — Medication 150 MCG/HR: at 21:44

## 2021-01-01 RX ADMIN — Medication 1 PACKET: at 07:50

## 2021-01-01 RX ADMIN — PROPOFOL 75 MCG/KG/MIN: 10 INJECTION, EMULSION INTRAVENOUS at 14:56

## 2021-01-01 RX ADMIN — Medication 200 MCG/HR: at 00:20

## 2021-01-01 RX ADMIN — INSULIN ASPART 1 UNITS: 100 INJECTION, SOLUTION INTRAVENOUS; SUBCUTANEOUS at 04:45

## 2021-01-01 RX ADMIN — Medication 1 PACKET: at 22:41

## 2021-01-01 RX ADMIN — PROPOFOL 55 MCG/KG/MIN: 10 INJECTION, EMULSION INTRAVENOUS at 16:26

## 2021-01-01 RX ADMIN — PROPOFOL 75 MCG/KG/MIN: 10 INJECTION, EMULSION INTRAVENOUS at 01:12

## 2021-01-01 RX ADMIN — MIDAZOLAM 2 MG: 1 INJECTION INTRAMUSCULAR; INTRAVENOUS at 23:43

## 2021-01-01 RX ADMIN — LISINOPRIL 20 MG: 20 TABLET ORAL at 11:10

## 2021-01-01 RX ADMIN — LEVOTHYROXINE SODIUM 75 MCG: 0.07 TABLET ORAL at 08:46

## 2021-01-01 RX ADMIN — PROPOFOL 75 MCG/KG/MIN: 10 INJECTION, EMULSION INTRAVENOUS at 19:56

## 2021-01-01 RX ADMIN — LEVOTHYROXINE SODIUM 75 MCG: 0.07 TABLET ORAL at 08:21

## 2021-01-01 RX ADMIN — LEVOTHYROXINE SODIUM 75 MCG: 0.07 TABLET ORAL at 11:10

## 2021-01-01 RX ADMIN — PROPOFOL 75 MCG/KG/MIN: 10 INJECTION, EMULSION INTRAVENOUS at 09:20

## 2021-01-01 RX ADMIN — SENNOSIDES A AND B 15 ML: 8.8 SYRUP ORAL at 08:50

## 2021-01-01 RX ADMIN — DEXAMETHASONE SODIUM PHOSPHATE 6 MG: 10 INJECTION INTRAMUSCULAR; INTRAVENOUS at 12:00

## 2021-01-01 RX ADMIN — IPRATROPIUM BROMIDE AND ALBUTEROL 2 PUFF: 20; 100 SPRAY, METERED RESPIRATORY (INHALATION) at 07:48

## 2021-01-01 RX ADMIN — Medication 0.18 MCG/KG/MIN: at 19:19

## 2021-01-01 RX ADMIN — Medication 1 PACKET: at 21:27

## 2021-01-01 RX ADMIN — PANTOPRAZOLE SODIUM 40 MG: 40 INJECTION, POWDER, FOR SOLUTION INTRAVENOUS at 07:52

## 2021-01-01 RX ADMIN — SODIUM CHLORIDE 1000 ML: 9 INJECTION, SOLUTION INTRAVENOUS at 21:06

## 2021-01-01 RX ADMIN — INSULIN ASPART 1 UNITS: 100 INJECTION, SOLUTION INTRAVENOUS; SUBCUTANEOUS at 23:46

## 2021-01-01 RX ADMIN — LEVOTHYROXINE SODIUM 75 MCG: 0.07 TABLET ORAL at 08:50

## 2021-01-01 RX ADMIN — BARICITINIB 2 MG: 2 TABLET, FILM COATED ORAL at 08:12

## 2021-01-01 RX ADMIN — ACETAMINOPHEN 650 MG: 325 TABLET, FILM COATED ORAL at 00:15

## 2021-01-01 RX ADMIN — Medication 1 PACKET: at 21:06

## 2021-01-01 RX ADMIN — EPOPROSTENOL 20 NG/KG/MIN: 1.5 INJECTION, POWDER, LYOPHILIZED, FOR SOLUTION INTRAVENOUS at 06:54

## 2021-01-01 RX ADMIN — MINERAL OIL AND PETROLATUM 3.5 G: 150; 830 OINTMENT OPHTHALMIC at 07:52

## 2021-01-01 RX ADMIN — DEXAMETHASONE SODIUM PHOSPHATE 12 MG: 10 INJECTION INTRAMUSCULAR; INTRAVENOUS at 13:24

## 2021-01-01 RX ADMIN — Medication 100 MCG/HR: at 14:02

## 2021-01-01 RX ADMIN — PROPOFOL 20 MCG/KG/MIN: 10 INJECTION, EMULSION INTRAVENOUS at 06:17

## 2021-01-01 RX ADMIN — SIMVASTATIN 40 MG: 40 TABLET, FILM COATED ORAL at 20:20

## 2021-01-01 RX ADMIN — SMOFLIPID 250 ML: 6; 6; 5; 3 INJECTION, EMULSION INTRAVENOUS at 21:22

## 2021-01-01 RX ADMIN — ENOXAPARIN SODIUM 40 MG: 40 INJECTION SUBCUTANEOUS at 21:32

## 2021-01-01 RX ADMIN — LEVOTHYROXINE SODIUM 75 MCG: 0.07 TABLET ORAL at 08:13

## 2021-01-01 RX ADMIN — LEVOTHYROXINE SODIUM 75 MCG: 0.07 TABLET ORAL at 07:48

## 2021-01-01 RX ADMIN — DOCUSATE SODIUM 100 MG: 50 LIQUID ORAL at 10:39

## 2021-01-01 RX ADMIN — INSULIN ASPART 1 UNITS: 100 INJECTION, SOLUTION INTRAVENOUS; SUBCUTANEOUS at 16:12

## 2021-01-01 RX ADMIN — ENOXAPARIN SODIUM 40 MG: 40 INJECTION SUBCUTANEOUS at 10:39

## 2021-01-01 RX ADMIN — Medication 125 MCG/HR: at 19:02

## 2021-01-01 RX ADMIN — PROPOFOL 30 MCG/KG/MIN: 10 INJECTION, EMULSION INTRAVENOUS at 00:20

## 2021-01-01 RX ADMIN — IPRATROPIUM BROMIDE AND ALBUTEROL 2 PUFF: 20; 100 SPRAY, METERED RESPIRATORY (INHALATION) at 21:02

## 2021-01-01 RX ADMIN — Medication 1 PACKET: at 08:18

## 2021-01-01 RX ADMIN — CEFAZOLIN SODIUM 1 G: 1 INJECTION, SOLUTION INTRAVENOUS at 10:39

## 2021-01-01 RX ADMIN — INSULIN ASPART 1 UNITS: 100 INJECTION, SOLUTION INTRAVENOUS; SUBCUTANEOUS at 12:25

## 2021-01-01 RX ADMIN — DEXAMETHASONE SODIUM PHOSPHATE 12 MG: 10 INJECTION INTRAMUSCULAR; INTRAVENOUS at 13:42

## 2021-01-01 RX ADMIN — PROPOFOL 30 MCG/KG/MIN: 10 INJECTION, EMULSION INTRAVENOUS at 06:34

## 2021-01-01 RX ADMIN — HYDROXYZINE HYDROCHLORIDE 10 MG: 10 TABLET ORAL at 04:42

## 2021-01-01 RX ADMIN — DEXAMETHASONE SODIUM PHOSPHATE 6 MG: 10 INJECTION INTRAMUSCULAR; INTRAVENOUS at 14:18

## 2021-01-01 RX ADMIN — PROPOFOL 30 MCG/KG/MIN: 10 INJECTION, EMULSION INTRAVENOUS at 15:55

## 2021-01-01 RX ADMIN — ASPIRIN 81 MG: 81 TABLET, COATED ORAL at 08:28

## 2021-01-01 RX ADMIN — BARICITINIB 4 MG: 2 TABLET, FILM COATED ORAL at 11:10

## 2021-01-01 ASSESSMENT — MIFFLIN-ST. JEOR
SCORE: 1250.38
SCORE: 1271.38
SCORE: 1334.57
SCORE: 1293.38
SCORE: 1286.38
SCORE: 1217.38
SCORE: 1272.38
SCORE: 1237.38
SCORE: 1281.38
SCORE: 1283.38
SCORE: 1225.38

## 2021-01-01 ASSESSMENT — ACTIVITIES OF DAILY LIVING (ADL)
ADLS_ACUITY_SCORE: 14
ADLS_ACUITY_SCORE: 20
ADLS_ACUITY_SCORE: 16
ADLS_ACUITY_SCORE: 14
ADLS_ACUITY_SCORE: 14
ADLS_ACUITY_SCORE: 12
ADLS_ACUITY_SCORE: 16
ADLS_ACUITY_SCORE: 10
ADLS_ACUITY_SCORE: 16
ADLS_ACUITY_SCORE: 17
ADLS_ACUITY_SCORE: 14
ADLS_ACUITY_SCORE: 12
ADLS_ACUITY_SCORE: 14
ADLS_ACUITY_SCORE: 12
ADLS_ACUITY_SCORE: 14
ADLS_ACUITY_SCORE: 14
ADLS_ACUITY_SCORE: 16
ADLS_ACUITY_SCORE: 17
ADLS_ACUITY_SCORE: 14
ADLS_ACUITY_SCORE: 16
ADLS_ACUITY_SCORE: 10
ADLS_ACUITY_SCORE: 14
ADLS_ACUITY_SCORE: 16
ADLS_ACUITY_SCORE: 14
ADLS_ACUITY_SCORE: 16
ADLS_ACUITY_SCORE: 18
ADLS_ACUITY_SCORE: 14
ADLS_ACUITY_SCORE: 16
ADLS_ACUITY_SCORE: 14
ADLS_ACUITY_SCORE: 10
ADLS_ACUITY_SCORE: 16
ADLS_ACUITY_SCORE: 16
ADLS_ACUITY_SCORE: 14
ADLS_ACUITY_SCORE: 12
ADLS_ACUITY_SCORE: 16
ADLS_ACUITY_SCORE: 14
ADLS_ACUITY_SCORE: 10
ADLS_ACUITY_SCORE: 19
ADLS_ACUITY_SCORE: 12
ADLS_ACUITY_SCORE: 17
ADLS_ACUITY_SCORE: 16
ADLS_ACUITY_SCORE: 18
ADLS_ACUITY_SCORE: 8
ADLS_ACUITY_SCORE: 14
ADLS_ACUITY_SCORE: 14
ADLS_ACUITY_SCORE: 12
ADLS_ACUITY_SCORE: 16
ADLS_ACUITY_SCORE: 14
ADLS_ACUITY_SCORE: 16
ADLS_ACUITY_SCORE: 14
ADLS_ACUITY_SCORE: 16
ADLS_ACUITY_SCORE: 14
ADLS_ACUITY_SCORE: 12
ADLS_ACUITY_SCORE: 16
ADLS_ACUITY_SCORE: 12
ADLS_ACUITY_SCORE: 14
ADLS_ACUITY_SCORE: 14
ADLS_ACUITY_SCORE: 19
ADLS_ACUITY_SCORE: 16
ADLS_ACUITY_SCORE: 17
ADLS_ACUITY_SCORE: 16
ADLS_ACUITY_SCORE: 18
ADLS_ACUITY_SCORE: 16
ADLS_ACUITY_SCORE: 14
ADLS_ACUITY_SCORE: 16
ADLS_ACUITY_SCORE: 16
ADLS_ACUITY_SCORE: 14
ADLS_ACUITY_SCORE: 16
ADLS_ACUITY_SCORE: 17
ADLS_ACUITY_SCORE: 14
ADLS_ACUITY_SCORE: 14
ADLS_ACUITY_SCORE: 18
ADLS_ACUITY_SCORE: 12
ADLS_ACUITY_SCORE: 18
ADLS_ACUITY_SCORE: 12
ADLS_ACUITY_SCORE: 16
ADLS_ACUITY_SCORE: 19
ADLS_ACUITY_SCORE: 10
ADLS_ACUITY_SCORE: 19
ADLS_ACUITY_SCORE: 10
ADLS_ACUITY_SCORE: 16
ADLS_ACUITY_SCORE: 12
ADLS_ACUITY_SCORE: 14
ADLS_ACUITY_SCORE: 16
ADLS_ACUITY_SCORE: 16
ADLS_ACUITY_SCORE: 19
ADLS_ACUITY_SCORE: 12
ADLS_ACUITY_SCORE: 14
ADLS_ACUITY_SCORE: 16
ADLS_ACUITY_SCORE: 17
ADLS_ACUITY_SCORE: 18
ADLS_ACUITY_SCORE: 14
ADLS_ACUITY_SCORE: 16
ADLS_ACUITY_SCORE: 16
ADLS_ACUITY_SCORE: 14
ADLS_ACUITY_SCORE: 19
ADLS_ACUITY_SCORE: 17
ADLS_ACUITY_SCORE: 14
ADLS_ACUITY_SCORE: 16
ADLS_ACUITY_SCORE: 12
ADLS_ACUITY_SCORE: 16
ADLS_ACUITY_SCORE: 19
ADLS_ACUITY_SCORE: 12
ADLS_ACUITY_SCORE: 18
ADLS_ACUITY_SCORE: 12
ADLS_ACUITY_SCORE: 12
ADLS_ACUITY_SCORE: 16
ADLS_ACUITY_SCORE: 14
ADLS_ACUITY_SCORE: 16
ADLS_ACUITY_SCORE: 10
ADLS_ACUITY_SCORE: 16
ADLS_ACUITY_SCORE: 14
ADLS_ACUITY_SCORE: 16
ADLS_ACUITY_SCORE: 12
ADLS_ACUITY_SCORE: 10
ADLS_ACUITY_SCORE: 14
ADLS_ACUITY_SCORE: 12
ADLS_ACUITY_SCORE: 16
ADLS_ACUITY_SCORE: 16
ADLS_ACUITY_SCORE: 14
ADLS_ACUITY_SCORE: 14
ADLS_ACUITY_SCORE: 16
ADLS_ACUITY_SCORE: 16
ADLS_ACUITY_SCORE: 12
ADLS_ACUITY_SCORE: 16
ADLS_ACUITY_SCORE: 16
ADLS_ACUITY_SCORE: 14
ADLS_ACUITY_SCORE: 16
ADLS_ACUITY_SCORE: 12
ADLS_ACUITY_SCORE: 16
ADLS_ACUITY_SCORE: 12
ADLS_ACUITY_SCORE: 17
ADLS_ACUITY_SCORE: 18
ADLS_ACUITY_SCORE: 17
ADLS_ACUITY_SCORE: 4
ADLS_ACUITY_SCORE: 16
ADLS_ACUITY_SCORE: 14
ADLS_ACUITY_SCORE: 14
ADLS_ACUITY_SCORE: 16
ADLS_ACUITY_SCORE: 18
ADLS_ACUITY_SCORE: 10
ADLS_ACUITY_SCORE: 14
ADLS_ACUITY_SCORE: 16
ADLS_ACUITY_SCORE: 14
ADLS_ACUITY_SCORE: 16
ADLS_ACUITY_SCORE: 14
ADLS_ACUITY_SCORE: 16
ADLS_ACUITY_SCORE: 10
ADLS_ACUITY_SCORE: 10
ADLS_ACUITY_SCORE: 12
ADLS_ACUITY_SCORE: 14
ADLS_ACUITY_SCORE: 16
ADLS_ACUITY_SCORE: 10
ADLS_ACUITY_SCORE: 19
ADLS_ACUITY_SCORE: 14
ADLS_ACUITY_SCORE: 16
ADLS_ACUITY_SCORE: 14
ADLS_ACUITY_SCORE: 16
ADLS_ACUITY_SCORE: 10
ADLS_ACUITY_SCORE: 16
WHICH_OF_THE_ABOVE_FUNCTIONAL_RISKS_HAD_A_RECENT_ONSET_OR_CHANGE?: FALL HISTORY
ADLS_ACUITY_SCORE: 16
ADLS_ACUITY_SCORE: 10
ADLS_ACUITY_SCORE: 17
ADLS_ACUITY_SCORE: 16
ADLS_ACUITY_SCORE: 18
ADLS_ACUITY_SCORE: 14
ADLS_ACUITY_SCORE: 16
ADLS_ACUITY_SCORE: 18
ADLS_ACUITY_SCORE: 17
ADLS_ACUITY_SCORE: 16
ADLS_ACUITY_SCORE: 18
ADLS_ACUITY_SCORE: 14
ADLS_ACUITY_SCORE: 12
ADLS_ACUITY_SCORE: 16
ADLS_ACUITY_SCORE: 16
ADLS_ACUITY_SCORE: 14
ADLS_ACUITY_SCORE: 16
ADLS_ACUITY_SCORE: 14
ADLS_ACUITY_SCORE: 10
ADLS_ACUITY_SCORE: 12
ADLS_ACUITY_SCORE: 14
ADLS_ACUITY_SCORE: 16
ADLS_ACUITY_SCORE: 14
ADLS_ACUITY_SCORE: 10
ADLS_ACUITY_SCORE: 14
ADLS_ACUITY_SCORE: 18
ADLS_ACUITY_SCORE: 14
ADLS_ACUITY_SCORE: 12
ADLS_ACUITY_SCORE: 10
ADLS_ACUITY_SCORE: 16
ADLS_ACUITY_SCORE: 12
ADLS_ACUITY_SCORE: 14
ADLS_ACUITY_SCORE: 14
ADLS_ACUITY_SCORE: 16
ADLS_ACUITY_SCORE: 14
ADLS_ACUITY_SCORE: 10
ADLS_ACUITY_SCORE: 14
ADLS_ACUITY_SCORE: 16
ADLS_ACUITY_SCORE: 10
ADLS_ACUITY_SCORE: 12
ADLS_ACUITY_SCORE: 16
ADLS_ACUITY_SCORE: 16
ADLS_ACUITY_SCORE: 14
ADLS_ACUITY_SCORE: 16
ADLS_ACUITY_SCORE: 17
ADLS_ACUITY_SCORE: 14
ADLS_ACUITY_SCORE: 17
ADLS_ACUITY_SCORE: 16
ADLS_ACUITY_SCORE: 18
ADLS_ACUITY_SCORE: 14
ADLS_ACUITY_SCORE: 16
ADLS_ACUITY_SCORE: 14
ADLS_ACUITY_SCORE: 12
ADLS_ACUITY_SCORE: 12
ADLS_ACUITY_SCORE: 16
ADLS_ACUITY_SCORE: 16
ADLS_ACUITY_SCORE: 18
ADLS_ACUITY_SCORE: 10
ADLS_ACUITY_SCORE: 14
ADLS_ACUITY_SCORE: 16
ADLS_ACUITY_SCORE: 19
ADLS_ACUITY_SCORE: 14
ADLS_ACUITY_SCORE: 12
ADLS_ACUITY_SCORE: 18
ADLS_ACUITY_SCORE: 14
ADLS_ACUITY_SCORE: 16
ADLS_ACUITY_SCORE: 14
ADLS_ACUITY_SCORE: 12
ADLS_ACUITY_SCORE: 19
ADLS_ACUITY_SCORE: 16
ADLS_ACUITY_SCORE: 12
ADLS_ACUITY_SCORE: 14
ADLS_ACUITY_SCORE: 16
ADLS_ACUITY_SCORE: 16
ADLS_ACUITY_SCORE: 12
ADLS_ACUITY_SCORE: 18
ADLS_ACUITY_SCORE: 10
ADLS_ACUITY_SCORE: 14
ADLS_ACUITY_SCORE: 14
ADLS_ACUITY_SCORE: 16
ADLS_ACUITY_SCORE: 14
ADLS_ACUITY_SCORE: 12
ADLS_ACUITY_SCORE: 14
ADLS_ACUITY_SCORE: 16
ADLS_ACUITY_SCORE: 14
ADLS_ACUITY_SCORE: 12
ADLS_ACUITY_SCORE: 17
ADLS_ACUITY_SCORE: 16
ADLS_ACUITY_SCORE: 17
ADLS_ACUITY_SCORE: 16
ADLS_ACUITY_SCORE: 12
ADLS_ACUITY_SCORE: 19
ADLS_ACUITY_SCORE: 16
ADLS_ACUITY_SCORE: 12
ADLS_ACUITY_SCORE: 12
ADLS_ACUITY_SCORE: 19
ADLS_ACUITY_SCORE: 14
ADLS_ACUITY_SCORE: 14
ADLS_ACUITY_SCORE: 12
ADLS_ACUITY_SCORE: 14
ADLS_ACUITY_SCORE: 16
ADLS_ACUITY_SCORE: 18
ADLS_ACUITY_SCORE: 16
ADLS_ACUITY_SCORE: 14
ADLS_ACUITY_SCORE: 16
ADLS_ACUITY_SCORE: 12
ADLS_ACUITY_SCORE: 12
ADLS_ACUITY_SCORE: 10
ADLS_ACUITY_SCORE: 16
ADLS_ACUITY_SCORE: 16
ADLS_ACUITY_SCORE: 14
ADLS_ACUITY_SCORE: 19
ADLS_ACUITY_SCORE: 16
ADLS_ACUITY_SCORE: 10
ADLS_ACUITY_SCORE: 19
ADLS_ACUITY_SCORE: 16
ADLS_ACUITY_SCORE: 16
ADLS_ACUITY_SCORE: 19
ADLS_ACUITY_SCORE: 16
ADLS_ACUITY_SCORE: 12
ADLS_ACUITY_SCORE: 18
ADLS_ACUITY_SCORE: 14
ADLS_ACUITY_SCORE: 14
ADLS_ACUITY_SCORE: 16
ADLS_ACUITY_SCORE: 12
ADLS_ACUITY_SCORE: 16
ADLS_ACUITY_SCORE: 14
ADLS_ACUITY_SCORE: 16
ADLS_ACUITY_SCORE: 18
ADLS_ACUITY_SCORE: 12
ADLS_ACUITY_SCORE: 16
ADLS_ACUITY_SCORE: 16
ADLS_ACUITY_SCORE: 20
ADLS_ACUITY_SCORE: 16
ADLS_ACUITY_SCORE: 20
ADLS_ACUITY_SCORE: 14
ADLS_ACUITY_SCORE: 14
ADLS_ACUITY_SCORE: 10
ADLS_ACUITY_SCORE: 18
ADLS_ACUITY_SCORE: 10
ADLS_ACUITY_SCORE: 14
ADLS_ACUITY_SCORE: 12
ADLS_ACUITY_SCORE: 16
ADLS_ACUITY_SCORE: 19
ADLS_ACUITY_SCORE: 16
ADLS_ACUITY_SCORE: 14
ADLS_ACUITY_SCORE: 17
ADLS_ACUITY_SCORE: 12
ADLS_ACUITY_SCORE: 20
ADLS_ACUITY_SCORE: 12
ADLS_ACUITY_SCORE: 16
ADLS_ACUITY_SCORE: 14
ADLS_ACUITY_SCORE: 14
ADLS_ACUITY_SCORE: 18
ADLS_ACUITY_SCORE: 16
ADLS_ACUITY_SCORE: 16
ADLS_ACUITY_SCORE: 12
ADLS_ACUITY_SCORE: 16
ADLS_ACUITY_SCORE: 10
ADLS_ACUITY_SCORE: 16
ADLS_ACUITY_SCORE: 14
ADLS_ACUITY_SCORE: 16
ADLS_ACUITY_SCORE: 16
ADLS_ACUITY_SCORE: 18
ADLS_ACUITY_SCORE: 12
ADLS_ACUITY_SCORE: 12
ADLS_ACUITY_SCORE: 16
ADLS_ACUITY_SCORE: 14
ADLS_ACUITY_SCORE: 16
ADLS_ACUITY_SCORE: 12
ADLS_ACUITY_SCORE: 16
ADLS_ACUITY_SCORE: 10
ADLS_ACUITY_SCORE: 16
ADLS_ACUITY_SCORE: 14
ADLS_ACUITY_SCORE: 14
ADLS_ACUITY_SCORE: 19
ADLS_ACUITY_SCORE: 14
ADLS_ACUITY_SCORE: 14
ADLS_ACUITY_SCORE: 16
ADLS_ACUITY_SCORE: 14
ADLS_ACUITY_SCORE: 16
ADLS_ACUITY_SCORE: 10
ADLS_ACUITY_SCORE: 18
ADLS_ACUITY_SCORE: 18
ADLS_ACUITY_SCORE: 16
ADLS_ACUITY_SCORE: 16
ADLS_ACUITY_SCORE: 19
ADLS_ACUITY_SCORE: 10
ADLS_ACUITY_SCORE: 19
ADLS_ACUITY_SCORE: 12
ADLS_ACUITY_SCORE: 12
ADLS_ACUITY_SCORE: 17
ADLS_ACUITY_SCORE: 14
ADLS_ACUITY_SCORE: 16
ADLS_ACUITY_SCORE: 10
ADLS_ACUITY_SCORE: 12
ADLS_ACUITY_SCORE: 16
ADLS_ACUITY_SCORE: 14
ADLS_ACUITY_SCORE: 14
ADLS_ACUITY_SCORE: 19
ADLS_ACUITY_SCORE: 17
ADLS_ACUITY_SCORE: 18
ADLS_ACUITY_SCORE: 10
ADLS_ACUITY_SCORE: 14
ADLS_ACUITY_SCORE: 16
ADLS_ACUITY_SCORE: 14
ADLS_ACUITY_SCORE: 10
ADLS_ACUITY_SCORE: 16
ADLS_ACUITY_SCORE: 18
ADLS_ACUITY_SCORE: 16
ADLS_ACUITY_SCORE: 10
ADLS_ACUITY_SCORE: 16
ADLS_ACUITY_SCORE: 12
ADLS_ACUITY_SCORE: 18
ADLS_ACUITY_SCORE: 16
ADLS_ACUITY_SCORE: 12
ADLS_ACUITY_SCORE: 16
ADLS_ACUITY_SCORE: 16
ADLS_ACUITY_SCORE: 14
ADLS_ACUITY_SCORE: 16
ADLS_ACUITY_SCORE: 12
ADLS_ACUITY_SCORE: 14
ADLS_ACUITY_SCORE: 18
ADLS_ACUITY_SCORE: 14
ADLS_ACUITY_SCORE: 12
ADLS_ACUITY_SCORE: 12
ADLS_ACUITY_SCORE: 14
ADLS_ACUITY_SCORE: 14
ADLS_ACUITY_SCORE: 17
ADLS_ACUITY_SCORE: 14
ADLS_ACUITY_SCORE: 10
ADLS_ACUITY_SCORE: 14
ADLS_ACUITY_SCORE: 18
ADLS_ACUITY_SCORE: 12
ADLS_ACUITY_SCORE: 14
ADLS_ACUITY_SCORE: 16
ADLS_ACUITY_SCORE: 12
ADLS_ACUITY_SCORE: 16
ADLS_ACUITY_SCORE: 16
ADLS_ACUITY_SCORE: 14
ADLS_ACUITY_SCORE: 12
ADLS_ACUITY_SCORE: 16
ADLS_ACUITY_SCORE: 14
ADLS_ACUITY_SCORE: 10
ADLS_ACUITY_SCORE: 14
ADLS_ACUITY_SCORE: 16
ADLS_ACUITY_SCORE: 16

## 2021-01-01 ASSESSMENT — ENCOUNTER SYMPTOMS
LIGHT-HEADEDNESS: 1
DIZZINESS: 1
VOMITING: 0
FEVER: 0
NECK PAIN: 0

## 2021-12-08 PROBLEM — U07.1 PNEUMONIA DUE TO 2019 NOVEL CORONAVIRUS: Status: ACTIVE | Noted: 2021-01-01

## 2021-12-08 PROBLEM — J12.82 PNEUMONIA DUE TO 2019 NOVEL CORONAVIRUS: Status: ACTIVE | Noted: 2021-01-01

## 2021-12-08 PROBLEM — J96.01 ACUTE RESPIRATORY FAILURE WITH HYPOXIA (H): Status: ACTIVE | Noted: 2021-01-01

## 2021-12-08 NOTE — ED TRIAGE NOTES
Patient arrives by EMS from her home. Patient report she fell 2-3 days ago in the bathroom after feeling dizzy. Patient's son found her in the bathroom today and called EMS. Patient reports she's been experiencing intermittent dizziness and had a prior fall at work last week. Patient is alert, c/o pain to lower back. Per EMS, patient was in the mid 80's on room air. IV established, , patient onm 10L NRB.

## 2021-12-08 NOTE — ED NOTES
Bed: ED33  Expected date: 12/8/21  Expected time: 5:46 PM  Means of arrival: Ambulance  Comments:  [id number]

## 2021-12-09 NOTE — PHARMACY
Pt states cell phone number is [phone].  Area code 612 number listed on face sheet as cell is incorrect.

## 2021-12-09 NOTE — PROGRESS NOTES
"/74 (BP Location: Left arm)   Pulse 96   Temp 97.6  F (36.4  C) (Oral)   Resp 20   Ht 1.549 m (5' 1\")   Wt 90.7 kg (200 lb)   SpO2 92%   BMI 37.79 kg/m      Pt A&Ox4. VSS on 8-10L oximyzer. LS dim. IS therapy encouraged. Dyspnea on exertion. Denies SOB at rest. Infrequent, productive cough. Denies pain. Incont of loose stools x 2. Barrier paste applied to redness on buttocks. Up A1. IV decadron, redmes, & lovenox. Will cont POC.     Andressa Walden RN    "

## 2021-12-09 NOTE — ED PROVIDER NOTES
History     Chief Complaint:  Fall     The history is provided by the patient, the EMS personnel and a relative.      Sunita Barajas is a 76 year old female with history of hypertension, hyperlipidemia who presents after a fall which occurred 2-3 days ago. Her family checked on her today and found her on her bathroom floor after she fell and was too weak to get up, and they therefore called 911. 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 Here in the ED, Sunita remembers going to the bathroom and feeling lightheaded before falling. She notes that she hit the back of her head and is having some soreness there. She mentions that she had a fall 2-3 weeks ago at work as well. She lives in senior apartments and is not vaccinated against COVID-19. Sunita denies fever, chest pain, vomiting, neck pain, or any other pain. She does note that she has had a cough for the past 2 weeks as well as some diarrhea. She states that she recently had a procedure done to evaluate for chronic pain in her low back/pelvis.    Review of Systems   Constitutional: Negative for fever.   Cardiovascular: Negative for chest pain.   Gastrointestinal: Negative for vomiting.   Musculoskeletal: Negative for neck pain.   Neurological: Positive for dizziness and light-headedness.   All other systems reviewed and are negative.    Allergies:  Sulfa drugs    Medications:  Prozac  Aspirin 81 mg  Lisinopril  Zocor  Levothyroxine    Past Medical History:    Cataracts  Parathyroid abnormality  Normocytic anemia  Depression  Pancreatitis  Tubular adenoma of colon  Uterine mass  JORDEN  Vitamin B12 deficiency   Vitamin D deficiency   Eating disorder  Dysthymia  Hypertension  Hyperlipidemia  Hypothyroidism  Female stress incontinence  Pericarditis  Shingles    Past Surgical History:    Breast augmentation  Tubal ligation  Colonoscopy x2     Family  "History:    Mother: arthritis, thyroid disease, depression   Father: COPD  Sister: hypertension, thyroid disease, diabetes  Brother: alcohol abuse, hypertension, stroke    Social History:  Patient presents to the ED with her son.  Patient presents to the ED via EMS.    Physical Exam     Patient Vitals for the past 24 hrs:   BP Temp Temp src Pulse Resp SpO2 Height Weight   12/09/21 0042 120/50 98.3  F (36.8  C) Oral 86 16 93 % -- --   12/08/21 2336 -- -- -- -- -- -- 1.549 m (5' 1\") 90.7 kg (200 lb)   12/08/21 2230 -- 98  F (36.7  C) Oral -- 18 -- -- --   12/08/21 2000 -- -- -- 86 -- 98 % -- --   12/08/21 1759 (!) 143/77 97.6  F (36.4  C) Oral 98 20 95 % -- --     Physical Exam  Nursing note and vitals reviewed.  Constitutional:  Appears well-developed and well-nourished.   HENT:   Head:    Atraumatic.   Mouth/Throat:   No oropharyngeal exudate. Dry mucous membranes.  Eyes:    Pupils are equal, round, and reactive to light.   Neck:    Normal range of motion. Neck supple. Nontender.     No tracheal deviation present. No thyromegaly present.   Cardiovascular:  Normal rate, regular rhythm, no murmur   Pulmonary/Chest: Chest wall nontender. Few bibasilar crackles in the lungs.  Abdominal:   Soft. Bowel sounds are normal. Exhibits no distension and      no mass. There is no tenderness.      There is no rebound and no guarding.   Back:   Nontender thoracic and lumbar spine. Pelvis stable and nontender.  :   Incontinent of stool with raw red superficial skin breakdown to the perineal area.  Musculoskeletal:  Exhibits no edema.   Lymphadenopathy:  No cervical adenopathy.   Neurological:   Alert and oriented to person, place, and time. GCS 15.  CN 2-12 intact.  and proximal upper extremity strength strong and equal.  Bilateral lower extremity strength strong and equal, including strong dorsiflexion and plantarflexion strength.  Sensation intact and equal to the face, arms and legs.  No facial droop or weakness. Normal " speech.  Follows commands and answers questions normally.    Skin:    Skin is warm and dry. No rash noted. No pallor.     Emergency Department Course     ECG  ECG taken at 1859, ECG read at 1900  Sinus rhythm with short MT  Low voltage QRS  Nonspecific ST and T wave abnormality  Abnormal ECG  Rate 87 bpm. MT interval 92 ms. QRS duration 98 ms. QT/QTc 372/447 ms. P-R-T axes 4 -8 170.     Imaging:  CT Chest (PE) Abdomen Pelvis w Contrast   Final Result   IMPRESSION:   1.  Pulmonary infiltrates most consistent with COVID-19 pneumonia.   2.  No acute findings in the abdomen or pelvis.   3.  5.4 and 1.7 cm left ovarian cysts have mildly increased in size since 2017. Recommend nonurgent follow-up ultrasound. Note the left ovary is positioned high and lateral, and will be best imaged transabdominally.            Head CT w/o contrast   Final Result   IMPRESSION:       1. Senescent changes and sequelae of chronic microangiopathy without acute intracranial abnormality.      Reading per radiology     Laboratory:  Labs Ordered and Resulted from Time of ED Arrival to Time of ED Departure   COMPREHENSIVE METABOLIC PANEL - Abnormal       Result Value    Sodium 135      Potassium 4.1      Chloride 101      Carbon Dioxide (CO2) 23      Anion Gap 11      Urea Nitrogen 39 (*)     Creatinine 1.54 (*)     Calcium 8.6      Glucose 120 (*)     Alkaline Phosphatase 50      AST 59 (*)     ALT 24      Protein Total 8.1      Albumin 3.0 (*)     Bilirubin Total 0.3      GFR Estimate 33 (*)    CK TOTAL - Abnormal     (*)    BLOOD GAS VENOUS - Abnormal    pH Venous 7.33      pCO2 Venous 48      pO2 Venous 21 (*)     Bicarbonate Venous 25      Base Excess/Deficit (+/-) -1.1      FIO2 100     CBC WITH PLATELETS AND DIFFERENTIAL - Abnormal    WBC Count 5.4      RBC Count 4.64      Hemoglobin 14.5      Hematocrit 43.9      MCV 95      MCH 31.3      MCHC 33.0      RDW 12.7      Platelet Count 156      % Neutrophils 87      % Lymphocytes 8       % Monocytes 5      % Eosinophils 0      % Basophils 0      % Immature Granulocytes 0      NRBCs per 100 WBC 0      Absolute Neutrophils 4.7      Absolute Lymphocytes 0.4 (*)     Absolute Monocytes 0.3      Absolute Eosinophils 0.0      Absolute Basophils 0.0      Absolute Immature Granulocytes 0.0      Absolute NRBCs 0.0     INFLUENZA A/B & SARS-COV2 PCR MULTIPLEX - Abnormal    Influenza A PCR Negative      Influenza B PCR Negative      SARS CoV2 PCR Positive (*)    CRP INFLAMMATION - Abnormal    CRP Inflammation 154.0 (*)    MAGNESIUM - Abnormal    Magnesium 2.6 (*)    LIPASE - Normal    Lipase 322     LACTIC ACID WHOLE BLOOD - Normal    Lactic Acid 1.7     TROPONIN I - Normal    Troponin I High Sensitivity 16     INR - Normal    INR 1.05     PARTIAL THROMBOPLASTIN TIME - Normal    aPTT 32     FIBRINOGEN ACTIVITY - Normal    Fibrinogen Activity 462     PHOSPHORUS - Normal    Phosphorus 4.0     ROUTINE UA WITH MICROSCOPIC REFLEX TO CULTURE   ABO AND RH    ABO/RH(D) O POS      SPECIMEN EXPIRATION DATE [id number]     URINE CULTURE   BLOOD CULTURE   BLOOD CULTURE     Emergency Department Course:    Reviewed:  I reviewed nursing notes, vitals, past medical history, care everywhere, and [facility]    Assessments:  1803 I obtained history and examined the patient as noted above.   2045 I spoke with and updated the patient's son.    Consults:   2059 I spoke with Dr. Hernandez from the hospitalist service regarding the patient's presentation, findings here in the ED, and plan of care.    Interventions:  1825 NS 1 L IV  2106 Decadron 6 mg IV  2106 NS 1 L IV  0038 Pepcid 20 mg IV  0041 NS 1 L IV    Disposition:  The patient was admitted to the hospital under the care of Dr. Hernandez.     Impression & Plan     Medical Decision Making:  I found this patient to have acute hypoxic respiratory failure due to COVID-19 infection with bilateral COVID-19 pneumonia. Therefore, she was placed on supplemental oxygen, IV fluid hydrated, and  given Decadron IV. She was admitted to medical telemetry floor under the care of the hospitalist Dr. Hernandez who plans to order remdesivir. There was no sign of bacterial infection at this time. She was very dehydrated so she was IV hydrated and urine will be collected by catheterization after she is fluid hydrated. There was no sign of traumatic injury or intracranial bleeding, but she does have some mild rhabdomyolysis which should respond to IV hydration. This was from laying on the ground for 2 days.    Diagnosis:    ICD-10-CM    1. Acute respiratory failure with hypoxia (H)  J96.01    2. Pneumonia due to 2019 novel coronavirus  U07.1     J12.82      Scribe Disclosure:  I, Tisha Antonio, am serving as a scribe at 6:00 PM on 12/8/2021 to document services personally performed by Radha Colorado MD based on my observations and the provider's statements to me.       Radha Colorado MD  12/09/21 0133       Radha Colorado MD  12/09/21 0133

## 2021-12-09 NOTE — ED NOTES
[facility]  ED Nurse Handoff Report    Sunita Barajas is a 76 year old female   ED Chief complaint: Fall  . ED Diagnosis:   Final diagnoses:   Acute respiratory failure with hypoxia (H)   Pneumonia due to 2019 novel coronavirus     Allergies:   Allergies   Allergen Reactions     Sulfa Drugs        Code Status: not on file  Activity level - Baseline/Home: Independent . Activity Level - Current:   Assist X 1. Lift room needed: No. Bariatric: No   Needed: No   Isolation: Yes. Infection: Not Applicable  COVID r/o and special precautions.     Vital Signs:   Vitals:    12/08/21 1759 12/08/21 2000   BP: (!) 143/77    Pulse: 98 86   Resp: 20    Temp: 97.6  F (36.4  C)    TempSrc: Oral    SpO2: 95% 98%       Cardiac Rhythm:  ,     NSR  Pain level:   4/10 lower back pain  Patient confused: No. Patient Falls Risk: Yes- weak, several falls   Elimination Status: Unable to void   Patient Report - Initial Complaint: fall. Focused Assessment:     Patient arrives by EMS from her home. Patient report she fell 2-3 days ago in the bathroom after feeling dizzy. Patient's son found her in the bathroom today and called EMS. Patient reports she's been experiencing intermittent dizziness and had a prior fall at work last week. Patient is alert, c/o pain to lower back. Per EMS, patient was in the mid 80's on room air. IV established, , patient onm 10L NRB.      Tests Performed:   Labs Ordered and Resulted from Time of ED Arrival to Time of ED Departure   COMPREHENSIVE METABOLIC PANEL - Abnormal       Result Value    Sodium 135      Potassium 4.1      Chloride 101      Carbon Dioxide (CO2) 23      Anion Gap 11      Urea Nitrogen 39 (*)     Creatinine 1.54 (*)     Calcium 8.6      Glucose 120 (*)     Alkaline Phosphatase 50      AST 59 (*)     ALT 24      Protein Total 8.1      Albumin 3.0 (*)     Bilirubin Total 0.3      GFR Estimate 33 (*)    CK TOTAL - Abnormal     (*)    BLOOD GAS VENOUS - Abnormal    pH  Venous 7.33      pCO2 Venous 48      pO2 Venous 21 (*)     Bicarbonate Venous 25      Base Excess/Deficit (+/-) -1.1      FIO2 100     CBC WITH PLATELETS AND DIFFERENTIAL - Abnormal    WBC Count 5.4      RBC Count 4.64      Hemoglobin 14.5      Hematocrit 43.9      MCV 95      MCH 31.3      MCHC 33.0      RDW 12.7      Platelet Count 156      % Neutrophils 87      % Lymphocytes 8      % Monocytes 5      % Eosinophils 0      % Basophils 0      % Immature Granulocytes 0      NRBCs per 100 WBC 0      Absolute Neutrophils 4.7      Absolute Lymphocytes 0.4 (*)     Absolute Monocytes 0.3      Absolute Eosinophils 0.0      Absolute Basophils 0.0      Absolute Immature Granulocytes 0.0      Absolute NRBCs 0.0     LIPASE - Normal    Lipase 322     LACTIC ACID WHOLE BLOOD - Normal    Lactic Acid 1.7     TROPONIN I - Normal    Troponin I High Sensitivity 16     ROUTINE UA WITH MICROSCOPIC REFLEX TO CULTURE   INFLUENZA A/B & SARS-COV2 PCR MULTIPLEX   URINE CULTURE   BLOOD CULTURE   BLOOD CULTURE     CT Chest (PE) Abdomen Pelvis w Contrast   Preliminary Result   IMPRESSION:   1.  Pulmonary infiltrates most consistent with COVID-19 pneumonia.   2.  No acute findings in the abdomen or pelvis.   3.  5.4 and 1.7 cm left ovarian cysts have mildly increased in size since 2017. Recommend nonurgent follow-up ultrasound. Note the left ovary is positioned high and lateral, and will be best imaged transabdominally.            Head CT w/o contrast   Final Result   IMPRESSION:       1. Senescent changes and sequelae of chronic microangiopathy without acute intracranial abnormality.        Treatments provided: IV 20g left AC, 2L NS, 6 mg Decadron  Family Comments: son contacted by MD and aware of plan  OBS brochure/video discussed/provided to patient:  Yes  ED Medications:   Medications   0.9% sodium chloride BOLUS (1,000 mLs Intravenous New Bag 12/8/21 2106)   0.9% sodium chloride BOLUS (0 mLs Intravenous Stopped 12/8/21 1925)   Saline CT  scan flush (86 mLs Intravenous Given 12/8/21 1937)   iopamidol (ISOVUE-370) solution 500 mL (80 mLs Intravenous Given 12/8/21 1932)   dexamethasone PF (DECADRON) injection 6 mg (6 mg Intravenous Given 12/8/21 2106)     Drips infusing:  Yes -NS  For the majority of the shift, the patient's behavior Green. Interventions performed were questions answered .    Sepsis treatment initiated: No     Patient tested for COVID 19 prior to admission: YES    ED Nurse Name/Phone Number: Viv Scott RN,   9:57 PM    RECEIVING UNIT ED HANDOFF REVIEW    Above ED Nurse Handoff Report was reviewed: Yes  Reviewed by: Sheyla Escalante, RN on December 9, 2021 at 7:53 PM

## 2021-12-09 NOTE — PROGRESS NOTES
[facility]    Hospitalist Progress Note  Name: Sunita Barajas    MRN: [id number]  Provider:  Stalin Wolf DO MPH  Date of Service: 12/09/2021    Summary of Stay: Sunita Barajas is a 76 year old female with prior history of dyslipidemia, hypertension, hypothyroidism and depression and apparently was on her usual state of health as she is independent of her activity of daily living as baseline, unvaccinated against COVID-19, works as a  in a gas/service station and has been having issues/symptomatology of intermittent coughing spells, intermittent loose stooling at least for more than a week prior to this presentation.      She presented here by EMS as apparently patient has been having worsening generalized weakness and had a fall event possibly 2 to 3 days ago and has been laying on her bathroom floor since then.  Patient did not report at work and her employer contacted the family that eventually check on her at home and found her on this condition.  She mentioned that she has not been eating any food at least in the past 2 days duration.    Here in the hospital she has a temperature of 97.6, heart rate 98, blood pressure 143/77, respiratory 20 and oxygen saturation 95% on 10 L oxygen mask.  Labs show elevated creatinine of 1.5, , .  She tested positive for COVID-19.  CT shows pulmonary infiltrates consistent with COVID-19 pneumonia but no PE.  There is also a left ovarian cyst.  She was started on dexamethasone, remdesivir and enoxaparin.  She was admitted for further management.    Problem List:   1. Acute hypoxic respiratory failure secondary to COVID-19 pneumonia: Oxygen requirements are up to 15 L this morning.  We will continue dexamethasone (day 2 of 10) and remdesivir (day 2 of 10).  She is agreeable to continue remdesivir.  If she clinically deteriorates further or requires high flow nasal cannula will initiate baricitinib.  We will continue enoxaparin for DVT  prophylaxis.  No PE seen on CT study.  Will check procalcitonin but low suspicion for bacterial infection.  2. Rhabdomyolysis: Was on IV fluids.  Will discontinue now is relatively contraindicated with Covid pneumonia and respiratory failure.  3. Acute kidney injury: Creatinine mildly elevated at 1.5, likely due to prerenal etiology and rhabdomyolysis.  Now trending down to 1.28 with fluids.  Discontinue fluids as noted above.  4. Ovarian cyst: Outpatient ultrasound follow-up.    DVT Prophylaxis: Enoxaparin (Lovenox) SQ  Code Status: No Order  Diet: Combination Diet Regular Diet Adult    Rojas Catheter: Not present  Disposition: Expected discharge in 3 days to home. Goals prior to discharge include manage respiratory failure.   Incidental Findings: None.  Family updated today: Yes discussed with son Diego twice today.     Interval History   Assumed care from previous hospitalist. The history was fully reviewed.  No chest pain but still coughing and shortness of breath. No nausea, vomiting, diarrhea, constipation. No fevers. No other specific complaints identified.     -Data reviewed today: I personally reviewed all new labs and imaging results over the last 24 hours.     Physical Exam   Temp: 97.6  F (36.4  C) Temp src: Oral BP: 130/68 Pulse: 80   Resp: 21 SpO2: 94 % O2 Device: Oxymizer cannula Oxygen Delivery: 6 LPM  Vitals:    12/08/21 2336   Weight: 90.7 kg (200 lb)     Vital Signs with Ranges  Temp:  [97.6  F (36.4  C)-98.3  F (36.8  C)] 97.6  F (36.4  C)  Pulse:  [75-98] 80  Resp:  [16-21] 21  BP: (110-143)/(50-77) 130/68  SpO2:  [93 %-98 %] 94 %  No intake/output data recorded.    GENERAL: No apparent distress. Awake, alert, and fully oriented.  HEENT: Normocephalic, atraumatic. Extraocular movements intact.  CARDIOVASCULAR: Regular rate and rhythm without murmurs or rubs. No S3.  PULMONARY: Clear bilaterally.  GASTROINTESTINAL: Soft, non-tender, non-distended. Bowel sounds normoactive.   EXTREMITIES: No  cyanosis or clubbing. No edema.  NEUROLOGICAL: CN 2-12 grossly intact, no focal neurological deficits.  DERMATOLOGICAL: No rash, ulcer, bruising, nor jaundice.     Medications     - MEDICATION INSTRUCTIONS -         [START ON 12/10/2021] remdesivir  100 mg Intravenous Q24H    And     [START ON 12/10/2021] sodium chloride 0.9%  50 mL Intravenous Q24H     dexamethasone  6 mg Intravenous Daily     enoxaparin ANTICOAGULANT  40 mg Subcutaneous Q24H     famotidine  20 mg Intravenous Q12H     ipratropium-albuterol  2 puff Inhalation 4x Daily     sodium chloride (PF)  3 mL Intracatheter Q8H     Data     Laboratory:  Recent Labs   Lab 12/09/21  0701 12/08/21  1853   WBC 4.0 5.4   HGB 13.1 14.5   HCT 40.8 43.9   MCV 97 95   * 156     Recent Labs   Lab 12/09/21  0701 12/08/21  1853    135   POTASSIUM 4.6 4.1   CHLORIDE 109 101   CO2 22 23   ANIONGAP 8 11   * 120*   BUN 31* 39*   CR 1.28* 1.54*   GFRESTIMATED 41* 33*   SEDA 8.3* 8.6     No results for input(s): CULT in the last 168 hours.    Imaging:  Recent Results (from the past 24 hour(s))   Head CT w/o contrast    Narrative    EXAM: CT HEAD W/O CONTRAST  LOCATION: [facility]  DATE/TIME: 12/8/2021 7:28 PM    INDICATION: Headache after trauma  COMPARISON: None available at time of dictation  TECHNIQUE: Routine CT Head without IV contrast. Multiplanar reformats. Dose reduction techniques were used.    FINDINGS:   INTRACRANIAL CONTENTS: No acute intracranial hemorrhage. No CT evidence of acute infarct. Sequelae of moderate chronic microangiopathy. Mild cerebral volume loss without hydrocephalus. No extra-axial fluid collections.  Patent basal cisterns.     VISUALIZED ORBITS/SINUSES/MASTOIDS: The orbits are unremarkable. The visualized paranasal sinuses and temporal bone structures are well-aerated.     BONES/SOFT TISSUES: The calvarium and skull base are unremarkable.       Impression    IMPRESSION:     1. Senescent changes and sequelae  of chronic microangiopathy without acute intracranial abnormality.   CT Chest (PE) Abdomen Pelvis w Contrast    Narrative    EXAM: CT CHEST PE ABDOMEN PELVIS W CONTRAST  LOCATION: [facility]  DATE/TIME: 12/8/2021 7:28 PM    INDICATION: History of hypertension and hyperlipidemia. Found down after a fall which occurred 2-3 days ago. Weakness and dizziness, cough and diarrhea.  COMPARISON: CT abdomen and pelvis 08/03/2017.  TECHNIQUE: CT chest pulmonary angiogram and routine CT abdomen pelvis with IV contrast. Arterial phase through the chest and venous phase through the abdomen and pelvis. Multiplanar reformats and MIP reconstructions were performed. Dose reduction   techniques were used.   CONTRAST: 80 mL Isovue-370.    FINDINGS:  ANGIOGRAM CHEST: Pulmonary arteries are normal caliber and negative for pulmonary emboli.    LUNGS AND PLEURA: There are patchy and confluent groundglass pulmonary infiltrates bilaterally suspicious for COVID-19 pneumonia. Dependent atelectasis in the lung bases.    MEDIASTINUM/AXILLAE: No lymphadenopathy. Moderate-sized hiatal hernia.    CORONARY ARTERY CALCIFICATION: None.    HEPATOBILIARY: Benign hepatic cysts.    PANCREAS: Normal.    SPLEEN: Normal.    ADRENAL GLANDS: Normal.    KIDNEYS/BLADDER: Benign parapelvic renal cysts. The bladder is moderately distended and extends cephalad to the level of the umbilicus.    BOWEL: Sigmoid colonic diverticulosis without signs of diverticulitis.    LYMPH NODES: Normal.    VASCULATURE: Unremarkable.    PELVIC ORGANS: 5.4 and 1.7 cm left ovarian cysts, mildly increased from previous 4 cm and 1.2 cm.    MUSCULOSKELETAL: Degenerative changes in the lower lumbar spine. No acute fractures. Bilateral rim calcified breast implants.      Impression    IMPRESSION:  1.  Pulmonary infiltrates most consistent with COVID-19 pneumonia.  2.  No acute findings in the abdomen or pelvis.  3.  5.4 and 1.7 cm left ovarian cysts have mildly  increased in size since 2017. Recommend nonurgent follow-up ultrasound. Note the left ovary is positioned high and lateral, and will be best imaged transabdominally.             Stalin Wolf DO MPH  [facility] hospital Hospitalist  [address] [place] [address]  12/09/2021

## 2021-12-09 NOTE — PLAN OF CARE
Pt is A&O x4. Denies pain and N/V. Pure wick in place. Urine sample sent. On 7L of O2 via Oxymask. Complains of dyspnea on exertion. VSS. Will continue to monitor.

## 2021-12-09 NOTE — H&P
[facility]  Hospitalist Admission Note  Name: Sunita Barajas    MRN: [id number]  YOB: 1945    Age: 76 year old  Date of admission: 12/8/2021  Primary care provider: No primary care provider on file.            Assessment and Plan:   Sunita Barajas is a 76 year old female with prior history of dyslipidemia, hypertension, hypothyroidism and depression and apparently was on her usual state of health as she is independent of her activity of daily living as baseline, unvaccinated against COVID-19, works as a  in a gas/service station and has been having issues/symptomatology of intermittent coughing spells, intermittent loose stooling at least for more than a week prior to this presentation.  She presented here by EMS as apparently patient has been having worsening generalized weakness and had a fall event possibly 2 to 3 days ago and has been laying on her bathroom floor since then.  Patient did not report at work and her employer contacted the family that eventually check on her at home and found her on this condition.  She denies any head trauma, bleeding tendencies, vomiting, abdominal pain and no reported mental status changes.  She mentioned that she has not been eating any food at least in the past 2 days duration.      1.  Acute hypoxic respiratory failure with findings of bilateral, multifocal pneumonia highly suspicious for COVID-19 pneumonia  2.  High suspicion for COVID-19 infection-pending testing results  3.  VALE secondary to severe dehydration with lack of oral intake at least in the past 2 days duration  4.  Acute rhabdomyolysis mild secondary to #1, has been laying on the floor for at least 48 hours  5.  Physical deconditioning  6.  History of hypertension  7.  History of hypothyroidism  8.  History of depression  9.  Dyslipidemia   10. Obesity with BMI of 37  11. 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  High risk for clinical deterioration  needs close monitoring and care  Continue isolation precautions for high suspicion for COVID-19 infection even if with negative COVID-19 testing  -Pending COVID-19 testing as done earlier in the emergency room  -Supportive care  -Check procalcitonin if COVID-19 negative  -Consider initiating antibiotics if with elevated procalcitonin levels  -Continue Decadron  -Oxygen support.  Titrate as able  -Fortunately improving and now was able to titrated down to 5 L by oxygen mask  -Start remdesivir if COVID-19 testing is positive  -Supportive care with antitussives, antipyretics as needed ordered  -I will continue IV fluids for now given her severe dehydrated state.  -Will continue at least another liter and then stop IV fluids given high suspicion for COVID-19 pneumonia  -IV Pepcid  -She can have regular diet  -Recheck her CK levels, recheck creatinine and BUN given concurrent VALE  -Hold her ACE inhibitor's given this VALE.  -She will need PT/OT evaluation and social service for discharge disposition  -Monitor labs such as CRP  -Earlier CT of the chest is not a PE protocol.  No mention about obvious PE  -Needs close follow-up as outpatient with the incidental findings of multiple ovarian cysts  -Chemo DVT prophylaxis  -Palliative care input for POLST assistance    Code status: Full code  Admit to inpatient  Prophylaxis: Lovenox  Disposition: To be determined but I am anticipating Sunita will be needing inpatient care at least in the next 3 to 5 days          Chief Complaint:   Fall, generalized weakness, apparently patient has been laying on the floor for at least 2 days duration       Source of Information:   Patient with fair reliability  Discussion with ED physician  Review of E chart records         History of Present Illness:   Sunita Barajas is a 76 year old female with prior history of dyslipidemia, hypertension, hypothyroidism and depression and apparently was on her usual state of health as she is independent of her  activity of daily living as baseline, unvaccinated against COVID-19, works as a  in a gas/service station and has been having issues/symptomatology of intermittent coughing spells, intermittent loose stooling at least for more than a week prior to this presentation.  She presented here by EMS as apparently patient has been having worsening generalized weakness and had a fall event possibly 2 to 3 days ago and has been laying on her bathroom floor since then.  Patient did not report at work and her employer contacted the family that eventually check on her at home and found her on this condition.  She denies any head trauma, bleeding tendencies, vomiting, abdominal pain and no reported mental status changes.  She mentioned that she has not been eating any food at least in the past 2 days duration.   During her initial presentation she was found to be hypoxic and was subsequently been placed on oxygen mask while undergoing work-up in the emergency room.  Afebrile, not tachycardic and no hypotension noted.  However she was found to be severely dehydrated, further investigation did show VALE, azotemia, elevated CPK, and underwent CT of the chest which revealed multifocal pneumonia, no acute findings in the abdomen and pelvis and incidentally found with multiple ovarian cysts.    She has a pending COVID-19 testing but given her presenting symptomatology, hypoxia, multifocal pneumonia she was treated as such for COVID-19 respiratory failure and pneumonia and was provided with Decadron.  IV fluids was provided given her VALE and severely dehydrated state.    Her case was eventually referred to us for further evaluation and care hence this hospitalization   During time my exam I found Sunita laying comfortably on the ED gurney.  She was sleeping but easily aroused with minimal verbal stimuli.  She mentioned that she is feeling much better and now comfortable at 5 L by oxygen mask.  She was able to provide much of her  history and denies any other new symptomatology or complaints at that time.                Past Medical History:     As listed above  History of prior pancreatitis  Stress incontinence  Prior pericarditis  Cataracts       Past Surgical History:     History of breast augmentation, tubal ligation         Social History:       Lives in a senior apartment  Denies EtOH abuse/dependence pattern  Independent in her activities of daily living.  Works in a gas station           Family History:   Family history was fully reviewed and non-contributory in this case.         Allergies:     Allergies   Allergen Reactions     Sulfa Drugs              Medications:     Prior to Admission medications    Not on File             Review of Systems:   A Comprehensive greater than 10 system review of systems was carried out.  Pertinent positives and negatives are noted above.  Otherwise negative for contributory information.           Physical Exam:   Blood pressure (!) 143/77, pulse 86, temperature 97.6  F (36.4  C), temperature source Oral, resp. rate 20, SpO2 98 %.  Wt Readings from Last 1 Encounters:   No data found for Wt     Exam:  GENERAL: No apparent distress. Awake, alert, and fully oriented.  HEENT: Normocephalic, atraumatic. Extraocular movements intact.  CARDIOVASCULAR: Regular rate and rhythm without murmurs or rubs. No JVD  PULMONARY: Fair air entry, bilateral fine crackles on all lung fields  ABDOMINAL: Soft, non-tender, non-distended. Bowel sounds normoactive. No hepatosplenomegaly.  EXTREMITIES: No cyanosis or clubbing. No edema.  NEUROLOGICAL: CN 2-12 grossly intact, awake and alert x3, spontaneous and coherent speech. no focal neurological deficits.  DERMATOLOGICAL: No rash, ulcer, ecchymoses, jaundice.  Psych: not agitation, not combative, pleasant mood         Data:   EKG: Normal sinus rhythm at a rate of 86 bpm with nonspecific ST changes    Imaging:  Results for orders placed or performed during the hospital  encounter of 12/08/21   Head CT w/o contrast    Narrative    EXAM: CT HEAD W/O CONTRAST  LOCATION: [facility]  DATE/TIME: 12/8/2021 7:28 PM    INDICATION: Headache after trauma  COMPARISON: None available at time of dictation  TECHNIQUE: Routine CT Head without IV contrast. Multiplanar reformats. Dose reduction techniques were used.    FINDINGS:   INTRACRANIAL CONTENTS: No acute intracranial hemorrhage. No CT evidence of acute infarct. Sequelae of moderate chronic microangiopathy. Mild cerebral volume loss without hydrocephalus. No extra-axial fluid collections.  Patent basal cisterns.     VISUALIZED ORBITS/SINUSES/MASTOIDS: The orbits are unremarkable. The visualized paranasal sinuses and temporal bone structures are well-aerated.     BONES/SOFT TISSUES: The calvarium and skull base are unremarkable.       Impression    IMPRESSION:     1. Senescent changes and sequelae of chronic microangiopathy without acute intracranial abnormality.   CT Chest (PE) Abdomen Pelvis w Contrast    Narrative    EXAM: CT CHEST PE ABDOMEN PELVIS W CONTRAST  LOCATION: [facility]  DATE/TIME: 12/8/2021 7:28 PM    INDICATION: History of hypertension and hyperlipidemia. Found down after a fall which occurred 2-3 days ago. Weakness and dizziness, cough and diarrhea.  COMPARISON: CT abdomen and pelvis 08/03/2017.  TECHNIQUE: CT chest pulmonary angiogram and routine CT abdomen pelvis with IV contrast. Arterial phase through the chest and venous phase through the abdomen and pelvis. Multiplanar reformats and MIP reconstructions were performed. Dose reduction   techniques were used.   CONTRAST: 80 mL Isovue-370.    FINDINGS:  ANGIOGRAM CHEST: Pulmonary arteries are normal caliber and negative for pulmonary emboli.    LUNGS AND PLEURA: There are patchy and confluent groundglass pulmonary infiltrates bilaterally suspicious for COVID-19 pneumonia. Dependent atelectasis in the lung  bases.    MEDIASTINUM/AXILLAE: No lymphadenopathy. Moderate-sized hiatal hernia.    CORONARY ARTERY CALCIFICATION: None.    HEPATOBILIARY: Benign hepatic cysts.    PANCREAS: Normal.    SPLEEN: Normal.    ADRENAL GLANDS: Normal.    KIDNEYS/BLADDER: Benign parapelvic renal cysts. The bladder is moderately distended and extends cephalad to the level of the umbilicus.    BOWEL: Sigmoid colonic diverticulosis without signs of diverticulitis.    LYMPH NODES: Normal.    VASCULATURE: Unremarkable.    PELVIC ORGANS: 5.4 and 1.7 cm left ovarian cysts, mildly increased from previous 4 cm and 1.2 cm.    MUSCULOSKELETAL: Degenerative changes in the lower lumbar spine. No acute fractures. Bilateral rim calcified breast implants.      Impression    IMPRESSION:  1.  Pulmonary infiltrates most consistent with COVID-19 pneumonia.  2.  No acute findings in the abdomen or pelvis.  3.  5.4 and 1.7 cm left ovarian cysts have mildly increased in size since 2017. Recommend nonurgent follow-up ultrasound. Note the left ovary is positioned high and lateral, and will be best imaged transabdominally.             Labs:  No results for input(s): CULT in the last 168 hours.  Recent Labs   Lab 12/08/21  1853   WBC 5.4   HGB 14.5   HCT 43.9   MCV 95        Recent Labs   Lab 12/08/21  1853      POTASSIUM 4.1   CHLORIDE 101   CO2 23   ANIONGAP 11   *   BUN 39*   CR 1.54*   GFRESTIMATED 33*   SEDA 8.6   PROTTOTAL 8.1   ALBUMIN 3.0*   BILITOTAL 0.3   ALKPHOS 50   AST 59*   ALT 24     No results for input(s): SED, CRP in the last 168 hours.  Recent Labs   Lab 12/08/21  1853   *     No results for input(s): INR in the last 168 hours.  Recent Labs   Lab 12/08/21  1853   LIPASE 322     No results for input(s): TROPONIN, TROPI, TROPR in the last 168 hours.    Invalid input(s): TROP, TROPONINIES  No results for input(s): COLOR, APPEARANCE, URINEGLC, URINEBILI, URINEKETONE, SG, UBLD, URINEPH, PROTEIN, UROBILINOGEN, NITRITE, LEUKEST,  RBCU, WBCU in the last 168 hours.

## 2021-12-10 NOTE — PROGRESS NOTES
[facility]    Hospitalist Progress Note  Name: Sunita Barajas    MRN: [id number]  Provider: Fred Eaton MD  Date of Service: 12/10/2021    Summary of Stay: Sunita Barajas is a 76 year old female with prior history of dyslipidemia, hypertension, hypothyroidism and depression and apparently was on her usual state of health as she is independent of her activity of daily living as baseline, unvaccinated against COVID-19, works as a  in a gas/service station and has been having issues/symptomatology of intermittent coughing spells, intermittent loose stooling at least for more than a week prior to this presentation.      She presented here by EMS as apparently patient has been having worsening generalized weakness and had a fall event possibly 2 to 3 days ago and has been laying on her bathroom floor since then.  Patient did not report at work and her employer contacted the family that eventually check on her at home and found her on this condition.  She mentioned that she has not been eating any food at least in the past 2 days duration.    Here in the hospital she has a temperature of 97.6, heart rate 98, blood pressure 143/77, respiratory 20 and oxygen saturation 95% on 10 L oxygen mask.  Labs show elevated creatinine of 1.5, , .  She tested positive for COVID-19.  CT shows pulmonary infiltrates consistent with COVID-19 pneumonia but no PE.  There is also a left ovarian cyst.  She was started on dexamethasone, remdesivir and enoxaparin.  She was admitted for further management.    Acute hypoxic respiratory failure secondary to COVID-19 pneumonia    -  Oxygen requirements are up to 90%FiO2 on 55L HFNC    - cont dexamethasone (day 3 of 10) and remdesivir (day 3 of 5).    - start baricitinib (Day #1/14 or until discharge)    - no PE on CT chest    - encouraged incentive spirometry    Acute on chronic renal failure    - per Care Everywhere her baseline Cr is about 1.1-1.2    - Cr  today is 1.2    - likely at her baseline Cr    Acute rhabdomyolysis    - received IVF    - improved    Ovarian cyst     - seen on CT    - will need uutpatient ultrasound follow-up (TV and abdominal)    HTN    - not on meds at home    - BPs stable here    - previously was on lisinopril 20mg    Hypothyroidism    - not on meds    - previously was on synthroid 75mcg    - check TSH/Free T4    Depression    - not on meds    HLP    - not currently on meds    - previously on simvastatin 40mg    Called and updated danette Cortez    Patient has multiple home medications   DVT Prophylaxis: Enoxaparin (Lovenox) SQ  Code Status: Full Code  Diet: Combination Diet Regular Diet Adult    Rojas Catheter: Not present  Disposition: Expected discharge in 3 days to home. Goals prior to discharge include manage respiratory failure.   Incidental Findings: None.  Family updated today: Yes discussed with danette Cortez twice today.     Interval History   Assumed care from previous hospitalist. The history was fully reviewed.  Still has cough and sob. No chest pain. Eating. No nausea, vomiting, diarrhea, constipation. No fevers. No other specific complaints identified.     -Data reviewed today: I personally reviewed all new labs and imaging results over the last 24 hours.     Physical Exam   Temp: 97.7  F (36.5  C) Temp src: Oral BP: 113/60 Pulse: 78   Resp: 20 SpO2: 95 % O2 Device: High Flow Nasal Cannula (HFNC) Oxygen Delivery: 55 LPM  Vitals:    12/08/21 2336   Weight: 90.7 kg (200 lb)     Vital Signs with Ranges  Temp:  [97.6  F (36.4  C)-98.5  F (36.9  C)] 97.7  F (36.5  C)  Pulse:  [65-96] 78  Resp:  [14-22] 20  BP: (113-141)/(56-74) 113/60  FiO2 (%):  [10 %-95 %] 90 %  SpO2:  [83 %-96 %] 95 %  No intake/output data recorded.    GENERAL: No apparent distress. Awake, alert, and fully oriented.  HEENT: Normocephalic, atraumatic. Extraocular movements intact.  CARDIOVASCULAR: Regular rate and rhythm without murmurs or rubs. No S3.  PULMONARY: crackles  in the bases (r>L)  GASTROINTESTINAL: Soft, non-tender, non-distended. Bowel sounds normoactive.   EXTREMITIES: No cyanosis or clubbing. No edema.  NEUROLOGICAL: CN 2-12 grossly intact, no focal neurological deficits.  DERMATOLOGICAL: No rash, ulcer, bruising, nor jaundice.     Medications     lactated ringers       - MEDICATION INSTRUCTIONS -         remdesivir  100 mg Intravenous Q24H    And     sodium chloride 0.9%  50 mL Intravenous Q24H     dexamethasone  6 mg Intravenous Daily     enoxaparin ANTICOAGULANT  40 mg Subcutaneous Q24H     famotidine  20 mg Intravenous Once     famotidine  20 mg Intravenous Q24H     ipratropium-albuterol  2 puff Inhalation 4x Daily     sodium chloride (PF)  3 mL Intracatheter Q8H     Data     Laboratory:  Recent Labs   Lab 12/10/21  0706 12/09/21  0701 12/08/21  1853   WBC 5.5 4.0 5.4   HGB 12.7 13.1 14.5   HCT 40.4 40.8 43.9   MCV 98 97 95   * 137* 156     Recent Labs   Lab 12/10/21  0706 12/09/21  0701 12/08/21  1853    139 135   POTASSIUM 4.0 4.6 4.1   CHLORIDE 108 109 101   CO2 19* 22 23   ANIONGAP 7 8 11   * 130* 120*   BUN 35* 31* 39*   CR 1.21* 1.28* 1.54*   GFRESTIMATED 44* 41* 33*   SEDA 7.8* 8.3* 8.6     No results for input(s): CULT in the last 168 hours.    Imaging:  No results found for this or any previous visit (from the past 24 hour(s)).    Fred Eaton MD

## 2021-12-10 NOTE — CONSULTS
"CLINICAL NUTRITION SERVICES  -  ASSESSMENT NOTE      Malnutrition Diagnosis: Unable to determine due to lack of NFPE and nutrition history        REASON FOR ASSESSMENT  uSnita Barajas is a 76 year old female seen by Registered Dietitian for Admission Nutrition Risk Screen for positive.    PMH of: Depression.    Admit 2/2: COVID+, respiratory failure, VALE.      NUTRITION HISTORY  - Information obtained from chart.  Limited ability to call into room d/t HFNC/respiratory needs.    - Allergies: NKFA.      CURRENT NUTRITION ORDERS  Diet Order:     Regular    Current Intake/Tolerance:  Respiratory needs began increasing 12/09.        NUTRITION FOCUSED PHYSICAL ASSESSMENT FOR DIAGNOSING MALNUTRITION)  No    Obtained from Chart/Interdisciplinary Team:  - Requiring HFNC  - No documentation of PI  - Stooling patterns reviewed    ANTHROPOMETRICS  Height: 5' 1\"  Weight: 200 lbs 0 oz  Body mass index is 37.79 kg/m .  Weight Status:  Obesity Grade II BMI 35-39.9  Weight History:  Wt Readings from Last 10 Encounters:   12/08/21 90.7 kg (200 lb)     - Wt of 200# from 11/03/2021.      LABS  Labs reviewed:  Electrolytes  Potassium (mmol/L)   Date Value   12/10/2021 4.0   12/09/2021 4.6   12/08/2021 4.1     Phosphorus (mg/dL)   Date Value   12/08/2021 4.0    Blood Glucose  Glucose (mg/dL)   Date Value   12/10/2021 129 (H)   12/09/2021 130 (H)   12/08/2021 120 (H)    Inflammatory Markers  CRP Inflammation (mg/L)   Date Value   12/10/2021 102.0 (H)   12/09/2021 146.0 (H)   12/08/2021 154.0 (H)     WBC Count (10e3/uL)   Date Value   12/10/2021 5.5   12/09/2021 4.0   12/08/2021 5.4     Albumin (g/dL)   Date Value   12/08/2021 3.0 (L)      Magnesium (mg/dL)   Date Value   12/08/2021 2.6 (H)     Sodium (mmol/L)   Date Value   12/10/2021 134   12/09/2021 139   12/08/2021 135    Renal  Urea Nitrogen (mg/dL)   Date Value   12/10/2021 35 (H)   12/09/2021 31 (H)   12/08/2021 39 (H)     Creatinine (mg/dL)   Date Value   12/10/2021 1.21 (H) "   12/09/2021 1.28 (H)   12/08/2021 1.54 (H)     Additional  Ketones Urine (mg/dL)   Date Value   12/09/2021 10  (A)        B/P: 154/75, T: 98.3, P: 79, R: 22      MEDICATIONS  Medications reviewed:    remdesivir  100 mg Intravenous Q24H    And     sodium chloride 0.9%  50 mL Intravenous Q24H     aspirin  81 mg Oral Daily     baricitinib  4 mg Oral Daily     dexamethasone  6 mg Intravenous Daily     enoxaparin ANTICOAGULANT  40 mg Subcutaneous Q24H     famotidine  20 mg Intravenous Q24H     FLUoxetine  60 mg Oral Daily     ipratropium-albuterol  2 puff Inhalation 4x Daily     levothyroxine  75 mcg Oral Daily     lisinopril  20 mg Oral Daily     simvastatin  40 mg Oral At Bedtime     sodium chloride (PF)  3 mL Intracatheter Q8H        - MEDICATION INSTRUCTIONS -            ASSESSED NUTRITION NEEDS PER APPROVED PRACTICE GUIDELINES:    Dosing Weight 91 kg   Estimated Energy Needs: 20-25 Kcal/Kg  Justification: maintenance  Estimated Protein Needs: 1-1.2 g pro/Kg  Justification: preservation of lean body mass  Estimated Fluid Needs: per MD      NUTRITION DIAGNOSIS:  Predicted inadequate nutrient intake (energy/protein) related to potential for decline in PO intakes pending COVID clinical course, respiratory needs.    NUTRITION INTERVENTIONS  Recommendations / Nutrition Prescription  Diet per MD.     Add 4 oz Ensure offering w/ meals BID.        Implementation  Nutrition education: Not appropriate at this time due to patient condition.    Medical Food Supplement: As above    Nutrition Goals  Patient to consume at least 50-75% of meals or supplements TID.      MONITORING AND EVALUATION:  Progress towards goals will be monitored and evaluated per protocol and Practice Guidelines          Kalyani Ashraf RDN, LD  Clinical Dietitian  3rd floor/ICU: [phone]  All other floors: [phone]  Weekend/holiday: [phone]

## 2021-12-10 NOTE — PLAN OF CARE
"BP (!) 154/75 (BP Location: Right arm)   Pulse 79   Temp 98.3  F (36.8  C) (Axillary)   Resp 22   Ht 1.549 m (5' 1\")   Wt 90.7 kg (200 lb)   SpO2 95%   BMI 37.79 kg/m        Pt A&Ox4. VSS on 90%-55 oximyzer. LS dim. IS therapy encouraged. Dyspnea on exertion. Denies SOB at rest. Infrequent, productive cough. Denies pain. . Barrier paste applied to redness on buttocks. Up A1. IV dexamethazone, redmes, & lovenox. Will cont POC.  "

## 2021-12-10 NOTE — PROGRESS NOTES
SPIRITUAL HEALTH SERVICES Progress Note     -- Met with patient at staff request.     -- Patient expressed grief and shame over falling and getting hospitalized; testing COVID positive and a decades long eating disorder that has overshadowed her opportunities and relationships.     -- Patient looks to her Spiritism anamaria as a resource.     -- I absolved patient of her grief and shame, saying that the Lord would not want her to carry these burdens.       -- I asked her instead to take that emotional energy and pray for her friends, family, and the staff and patients at this hospital.  She liked that idea and said she would do it.     -- I also followed up with bedside nurse regarding making sure that eating disorder, as described by patient, is addressed.    Rev. Christina Mei M.Div.  Staff   Phone  [phone]

## 2021-12-10 NOTE — PROVIDER NOTIFICATION
MD paged to notify of patient's decreasing oxygen saturation on 15 L oxymizer cannula. RT in touch and has HFNC available, but orders are needed. HFNC orders placed. RT to come assess patient and need for HFNC.

## 2021-12-10 NOTE — PLAN OF CARE
"[id number]    Inpatient Progress Note:    /60 (BP Location: Right arm)   Pulse 78   Temp 97.7  F (36.5  C) (Oral)   Resp 20   Ht 1.549 m (5' 1\")   Wt 90.7 kg (200 lb)   SpO2 95%   BMI 37.79 kg/m       Orientation: alert but became more drowsy overnight, easily arousable, disoriented to place  Pain status: tylenol administered for abdominal cramping, symptoms relieved after BM  Activity: up with assist x1 and gait belt prior to HFNC needs, repositioning in bed and encouraging side lying  Resp: shortness of breath, dyspnea on exertion, increasing O2 needs -- RT consulted and patient switched from oxymizer cannula to HFNC, continuous pulse oximetry monitoring patient, LS diminished bilaterally, infrequent nonproductive cough  Cardiac: WDL, telemetry monitoring reading SR with HR 80s overnight per telemetry tech  GI: diarrhea - liquid/watery yellow stool, incontinence  : incontinent - purewick in place  Skin: abrasion R knee, scattered bruising, pale cool skin, blanchable redness to buttocks from diarrhea -- barrier cream applied  LDA: peripheral IV   Infusions: SL  Pertinent Labs: fibrinogen 506, D dimer 0.75, BUN 31, creat 1.28, CRP inflammation 146, procalcitonin 0.17, troponin 16  Diet: regular  Consults: spiritual health, RT  Discharge Plan: continue decadron, remdesivir, and lovenox, **per MD note: if clinically deteriorates further or requires high flow nasal cannula will initiate baricitinib** --> RN notified during shift report, will address with day provider; wean O2 as able, encourage side lying or proning (pt did not tolerate), encourage IS use    Will continue to monitor and provide cares.     Sheyla Escalante, RN         "

## 2021-12-10 NOTE — PHARMACY-ADMISSION MEDICATION HISTORY
Admission medication history interview status for this patient is complete. See [facility] admission navigator for allergy information, prior to admission medications and immunization status.     Medication history interview done, indicate source(s): Patient not able to help with meds. Contacted pt's clinic to review med list  Medication history resources (including written lists, pill bottles, clinic record):[facility] list  Pharmacy:      Changes made to PTA medication list:  Added: all meds  Changed: _  Reported as Not Taking: -  Removed: -    Actions taken by pharmacist (provider contacted, etc):None     Additional medication history information: Some medications (lisinopril, simvastatin, levothyroxine) not filled for a while but pt suppose to be on meds    Medication reconciliation/reorder completed by provider prior to medication history?  N   (Y/N)         Prior to Admission medications    Medication Sig Last Dose Taking? Auth Provider   aspirin 81 MG EC tablet Take 81 mg by mouth daily  Yes Unknown, Entered By History   cyanocobalamin (CYANOCOBALAMIN) 1000 MCG/ML injection Inject 1 mL into the muscle every 30 days  Yes Unknown, Entered By History   ergocalciferol (ERGOCALCIFEROL) 1.25 MG (27967 UT) capsule Take 50,000 Units by mouth  Yes Unknown, Entered By History   FLUoxetine (PROZAC) 20 MG capsule Take 20 mg by mouth daily Take with 40mg = 60mg total dose  Yes Unknown, Entered By History   FLUoxetine (PROZAC) 40 MG capsule Take 40 mg by mouth daily Take with 20mg = total dose of 60mg  Yes Unknown, Entered By History   levothyroxine (SYNTHROID/LEVOTHROID) 75 MCG tablet Take 75 mcg by mouth daily  Yes Unknown, Entered By History   lisinopril (ZESTRIL) 20 MG tablet Take 20 mg by mouth daily  Yes Unknown, Entered By History   simvastatin (ZOCOR) 40 MG tablet Take 40 mg by mouth At Bedtime  Yes Unknown, Entered By History

## 2021-12-10 NOTE — PLAN OF CARE
Pt A&Ox4. VSS on 8-10L oximyzer. LS dim. IS therapy enforced. Dyspnea on exertion. Denies pain.  SOB at rest. Infrequent, productive cough.

## 2021-12-10 NOTE — PROGRESS NOTES
SPIRITUAL HEALTH SERVICES Progress Note     -- Met with patient at staff request.     -- Patient expressed issues of grief and shame over getting Covid 19.       -- Reflective conversation and prayer provided.      Rev. Christina Mei M.Div.  Staff   Phone  [phone]

## 2021-12-10 NOTE — PROGRESS NOTES
Called for pt desatting on 15 L oxymizer. Pt satting low 80's. Placed on HFNC and positioned to left side. On 55 L 95%, pt sats 92%. RT will continue to monitor.    Nikky Kessler, RT

## 2021-12-10 NOTE — PROGRESS NOTES
ROOM # 231    Living Situation (if not independent, order SW consult): [facility] living  Facility name: [facility]  : Son    Activity level at baseline: Ind with no assistive devices   Activity level on admit: x2      Patient registered to observation; given Patient Bill of Rights; given the opportunity to ask questions about observation status and their plan of care.  Patient has been oriented to the observation room, bathroom and call light is in place.    Discussed discharge goals and expectations with patient/family.

## 2021-12-10 NOTE — PROGRESS NOTES
Cross Cover    Called for abdominal cramping, anxiety, and sleep medication    Patient is unvaccinated for COVID and here with acute hypoxic respiratory failure 2/2 COVID 19 pna.    Prn hydroxyzine orders in     XC #2    Increasing O2 needs despite 15 L/FM, orders in for HF/bipap if needed

## 2021-12-11 NOTE — PLAN OF CARE
Temp: 97.7  F (36.5  C) Temp src: Oral BP: 100/52 Pulse: 80   Resp: 20 SpO2: 96 % O2 Device: High Flow Nasal Cannula (HFNC) Oxygen Delivery: 40 LPM    A&Ox4. Up SBA-Ax1 at times. Denies pain. Up in chair for dinner, pt is ambulating to the bathroom and tolerating well when she moves slow. Better appetite today. Infrequent cough. O2 alarm sounding frequently, when pt is reminded to breathe through her nose then out her mouth her O2 goes up immediately however, pt is a mouth breather. Plan- dexamethasone daily, remdesivir daily, baricitinib daily, meropenem every 12 hours for UTI, high flow O2- respiratory following.

## 2021-12-11 NOTE — PLAN OF CARE
Care from 2300-0745     Temp: 97.5  F (36.4  C)  Temp src: Oral  BP: 97/58 (Prone)  Pulse: 67  Resp: 20  SpO2: 99 %  O2 Device: High Flow Nasal Cannula (HFNC)      Admit Date:  12/08/2021  Admitting Diagnosis: Covid pneumonia  Pertinent History: CRF, Rhabdo, hypothryoidism, htn, depression    Isolation Precautions:   special covid isolation.    Neuro: Alert and Oriented x4  Activity: are SBA with Gait Belt  Telemetry Monitoring: No  Pain: denying pain.  Labs / Tests:   GI:  :  Medications:  Misc:  LDA's: Peripheral  Fluids: is Saline locked.  Diet: Regular  Living Situation: lives at independent   Consults:     Discharge Disposition:  TBD    Plan of Care:  TBD

## 2021-12-11 NOTE — PROGRESS NOTES
Pt remains on HFNC 40 L 90%. Skin is intact. BS diminished. RT will monitor.    Nikky Kessler, RT

## 2021-12-11 NOTE — PLAN OF CARE
Patient alert/oriented x4, forgetful at times. Lung sounds diminished. Regular heart rhythm. Abdomen is soft and non-tender. Voiding without difficulty. No skin concerns. Continue COVID meds and respiratory support. Urine culture showing ESBL. Meropenum ordered.

## 2021-12-11 NOTE — PROGRESS NOTES
[facility]  Hospitalist Progress Note  Patient Name: Suntia Barajas    MRN: [id number]  Provider: Ata Helms MD  12/11/21             Assessment and Plan:      Summary of Stay: Sunita Barajas is a 76 year old female with history of dyslipidemia, hypertension, hypothyroidism and depression.  She has not been vaccinated for COVID-19.  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 She had been having spells of intermittent coughing spells and intermittent loose stooling at least a week prior to this presentation. She presented to the [facility] ED on 12/8/21 with worsening generalized weakness. She had had a fall event possibly 2 to 3 days prior to presentation and had been laying on her bathroom floor since.  She did not report to work so her employer contacted the family and they checked on her at home and found her on the floor.  She mentioned that she had not been eating any food for at least 2 days. ED work up showed temperature of 97.6, heart rate 98, blood pressure 143/77, respiratory 20 and oxygen saturation 95% on 10 L oxygen mask.  Labs show elevated creatinine of 1.5, , .  She tested positive for COVID-19.  CT showed pulmonary infiltrates consistent with COVID-19 pneumonia but no PE.  There was also a left ovarian cyst. She was admitted for management of COVID-19 pneumonia. She was treated with dexamethasone, remdesivir and enoxaparin.      Problem list:     Acute hypoxic respiratory failure secondary to COVID-19 pneumonia    - Not vaccinated    - Date of onset of symptoms: unclear maybe about 11/23/21     - Date of positive test:  12/8/21     - Continue oxygen: currently on HFNC 90% at 40 lpm; wean as tolerated    - Continue dexamethasone (day 4 of 10) and remdesivir (day 4 of 5).    - Continue baricitinib (Day #2/14 or until discharge)    - No PE on CT chest    - Encouraged incentive spirometry    - Continue daily labs     ESBL E. Coli UTI    - Plan on 3-5  "days of Meropenem     Acute on chronic renal failure    - per Care Everywhere her baseline Cr is about 1.1-1.2    - Cr today is 1.2    - likely at her baseline Cr     Acute rhabdomyolysis    - received IVF    - improved     Ovarian cyst     - seen on CT    - will need uutpatient ultrasound follow-up (TV and abdominal)     HTN    - not on meds at home    - BPs stable here    - previously was on lisinopril 20 mg which was started here     Hypothyroidism    - not on meds    - previously was on synthroid 75mcg which was started here    - check TSH/Free T4     Depression    - not on meds     HLP    - not currently on meds    - previously on simvastatin 40mg     Code Status: Full Code  DVT Prophylaxis: Enoxaparin (Lovenox) SQ  Disposition: Still on high O2 levels. Likely here for several more days          Interval History:      No new problems. Still SOB with activity. Feels a little better at rest. No chest pain.                   Physical Exam:      Last Vital Signs:  /61 (BP Location: Right arm)   Pulse 80   Temp 97.8  F (36.6  C) (Oral)   Resp 16   Ht 1.549 m (5' 1\")   Wt 90.7 kg (200 lb)   SpO2 94%   BMI 37.79 kg/m    No intake or output data in the 24 hours ending 12/11/21 1609    GENERAL:  Comfortable. Cooperative.  PSYCH: pleasant, oriented, No acute distress.  EYES: PERRLA, Normal conjunctiva.  HEART:  Regular rate and rhythm. No JVD. Pulses normal. No edema.  LUNGS:  Clear to auscultation, normal Respiratory effort.  ABDOMEN:  Soft, no hepatosplenomegaly, normal bowel sounds.  EXTREMETIES: No clubbing, cyanosis or ischemia  SKIN:  Dry to touch, No rash.           Medications:      All current medications were reviewed.         Data:      All new lab and imaging data was reviewed.   Labs:       Lab Results   Component Value Date     12/11/2021     12/10/2021     12/09/2021    Lab Results   Component Value Date    CHLORIDE 109 12/11/2021    CHLORIDE 108 12/10/2021    CHLORIDE 109 " 12/09/2021    Lab Results   Component Value Date    BUN 40 12/11/2021    BUN 35 12/10/2021    BUN 31 12/09/2021      Lab Results   Component Value Date    POTASSIUM 4.0 12/11/2021    POTASSIUM 4.0 12/10/2021    POTASSIUM 4.6 12/09/2021    Lab Results   Component Value Date    CO2 20 12/11/2021    CO2 19 12/10/2021    CO2 22 12/09/2021    Lab Results   Component Value Date    CR 1.33 12/11/2021    CR 1.21 12/10/2021    CR 1.28 12/09/2021        Recent Labs   Lab 12/11/21  0602 12/10/21  0706 12/09/21  0701   WBC 4.0 5.5 4.0   HGB 13.1 12.7 13.1   HCT 38.9 40.4 40.8   MCV 94 98 97    146* 137*

## 2021-12-11 NOTE — PROVIDER NOTIFICATION
DATE:  12/11/2021   TIME OF RECEIPT FROM LAB:  0724  LAB TEST:  Urine culture   LAB VALUE:  ESBL  RESULTS GIVEN WITH READ-BACK TO (PROVIDER):  Dr. Helms  TIME LAB VALUE REPORTED TO PROVIDER:   0799

## 2021-12-11 NOTE — PROGRESS NOTES
Patient awake and assisted with one to bathroom to void. Bed alarm is armed.  Patient did attempt to get up herself. Reiterated with her that she cannot get up alone, needs to call for help. When asked her where she said hospital in [place].  VSS.  High deysi settings unchanged.  RT was just in to see patient.  Will continue to monitor and assess.

## 2021-12-11 NOTE — PLAN OF CARE
Temp: 97.6  F (36.4  C) Temp src: Oral BP: 122/52 Pulse: 75   Resp: 20 SpO2: 91 % pt on highflow 90 FiO2 on 40L sating in lower 90s and upper 80s at times, self proning right now. If O2 sats continue to desat per respiratory pt will need to be put on bipap.     A&Ox4. Up SBA-Ax1. Purewick pulled as pt is able to ambulate to the bathroom. Pt up in chair and did ambulate in room evening shift. Pt did get lightheaded and pale at one time and pt needed to sit down, pt recovered quickly. Encouraged oral intake however pt only agreeable to drink her ensure. Denies pain. Needs encouragement. Plan- dexamethasone daily, remdesivir daily, baricitinib dialy, continuous O2 monitoring.

## 2021-12-12 NOTE — PROGRESS NOTES
A HFNC of  40L@ 95% remains on pt for PEEP therapy. Pt is tolerating it well. Will continue to monitor and assess the pt's current respiratory status and needs.    Chiquis Peterson, RT

## 2021-12-12 NOTE — PLAN OF CARE
Pertinent Assessments: VSS. A/Ox2. Forgetful. LS dim throughout. Denies cough. HFNC. Up with 1a gb/w. Voiding.   Major Shift Events: None  Treatment Plan: Inhaler. IV steroids. IV abx. Lovenox. Remdesivir. Wean O2 as able. Creat at baseline per MD note. From [facility] - unsure of discharge plan at this time.

## 2021-12-12 NOTE — PROGRESS NOTES
Patient requires constant reminding to take a deep breath in through her nose, O2 saturation reading goes to 80% without coaching.  Patient is on her side with encouragement.  Rolls onto her back and also desaturates her O2 level.  Patient does not attempt to remove her hi flow cannula.  When encouraged to breathe via her nose and stay on her side or ly prone saturation level rises to 96%.  Hi flow at 43% O2.  Will continue cares and observation.

## 2021-12-12 NOTE — PLAN OF CARE
"Addendum              []Hide copied text    []Hover for details    Care from [id number]     /63 (BP Location: Right arm)   Pulse 72   Temp 97.8  F (36.6  C) (Axillary)   Resp 16   Ht 1.549 m (5' 1\")   Wt 90.7 kg (200 lb)   SpO2 90%   BMI 37.79 kg/m         Admit Date:  12/08/2021  Admitting Diagnosis: Covid pneumonia  Pertinent History: CRF, Rhabdo, hypothryoidism, htn, depression     Isolation Precautions:   special covid isolation.    Neuro: Alert and Oriented x4  Activity: are SBA with Gait Belt  Telemetry Monitoring: No  Pain: denying pain.  Labs / Tests:   GI: wnl  : wnl  Medications:  Misc:  LDA's: Peripheral  Fluids: is Saline locked.  Diet: Regular  Living Situation: lives at independent   Consults:     Discharge Disposition:  TBD              "

## 2021-12-12 NOTE — PROGRESS NOTES
[facility]  Hospitalist Progress Note  Patient Name: Sunita Barajas    MRN: [id number]  Provider: Ata Helms MD  12/12/21             Assessment and Plan:      Summary of Stay: Sunita Barajas is a 76 year old female with history of dyslipidemia, hypertension, hypothyroidism and depression.  She has not been vaccinated for COVID-19.  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 She had been having spells of intermittent coughing spells and intermittent loose stooling at least a week prior to this presentation. She presented to the [facility] ED on 12/8/21 with worsening generalized weakness. She had had a fall event possibly 2 to 3 days prior to presentation and had been laying on her bathroom floor since.  She did not report to work so her employer contacted the family and they checked on her at home and found her on the floor.  She mentioned that she had not been eating any food for at least 2 days. ED work up showed temperature of 97.6, heart rate 98, blood pressure 143/77, respiratory 20 and oxygen saturation 95% on 10 L oxygen mask.  Labs show elevated creatinine of 1.5, , .  She tested positive for COVID-19.  CT showed pulmonary infiltrates consistent with COVID-19 pneumonia but no PE.  There was also a left ovarian cyst. She was admitted for management of COVID-19 pneumonia. She was treated with dexamethasone, remdesivir and enoxaparin.       Problem list:     Acute hypoxic respiratory failure secondary to COVID-19 pneumonia    - Not vaccinated    - Date of onset of symptoms: unclear maybe about 11/23/21     - Date of positive test:  12/8/21     - Continue oxygen: currently on HFNC 65% at 40 lpm; wean as tolerated    - Continue dexamethasone (day 5of 10)    - Complete remdesivir today    - Continue baricitinib (Day #3/14 or until discharge)    - No PE on CT chest    - Encouraged incentive spirometry    - Continue daily labs     ESBL E. Coli UTI    - Plan on  "3-5 days of Meropenem (day 2)     Acute on chronic renal failure    - per Care Everywhere her baseline Cr is about 1.1-1.2    - Cr today is 1.2    - likely at her baseline Cr     Acute rhabdomyolysis    - received IVF    - improved     Ovarian cyst     - seen on CT    - will need uutpatient ultrasound follow-up (TV and abdominal)     HTN    - not on meds at home    - BPs stable here    - previously was on lisinopril 20 mg which was started here     Hypothyroidism    - not on meds    - previously was on synthroid 75mcg which was started here    - check TSH/Free T4     Depression    - not on meds     HLP    - not currently on meds    - previously on simvastatin 40mg     Code Status: Full Code  DVT Prophylaxis: Enoxaparin (Lovenox) SQ  Disposition: Still on high O2 levels. Likely here for several more days        Interval History:      No new problems. A little less short of breath. No chest pain, ab pain, diarrhea, or dysuria.                   Physical Exam:      Last Vital Signs:  /45 (BP Location: Left arm)   Pulse 73   Temp 97.5  F (36.4  C) (Oral)   Resp 20   Ht 1.549 m (5' 1\")   Wt 90.7 kg (200 lb)   SpO2 98%   BMI 37.79 kg/m      Intake/Output Summary (Last 24 hours) at 12/12/2021 1603  Last data filed at 12/12/2021 1200  Gross per 24 hour   Intake 30 ml   Output --   Net 30 ml       GENERAL:  Comfortable. Cooperative.  PSYCH: pleasant, oriented, No acute distress.  EYES: PERRLA, Normal conjunctiva.  HEART:  Regular rate and rhythm. No JVD. Pulses normal. No edema.  LUNGS:  Clear to auscultation, normal Respiratory effort.  ABDOMEN:  Soft, no hepatosplenomegaly, normal bowel sounds.  EXTREMETIES: No clubbing, cyanosis or ischemia  SKIN:  Dry to touch, No rash.           Medications:      All current medications were reviewed.         Data:      All new lab and imaging data was reviewed.   Labs:       Lab Results   Component Value Date     12/12/2021     12/11/2021     12/10/2021 "    Lab Results   Component Value Date    CHLORIDE 112 12/12/2021    CHLORIDE 109 12/11/2021    CHLORIDE 108 12/10/2021    Lab Results   Component Value Date    BUN 36 12/12/2021    BUN 40 12/11/2021    BUN 35 12/10/2021      Lab Results   Component Value Date    POTASSIUM 4.3 12/12/2021    POTASSIUM 4.0 12/11/2021    POTASSIUM 4.0 12/10/2021    Lab Results   Component Value Date    CO2 20 12/12/2021    CO2 20 12/11/2021    CO2 19 12/10/2021    Lab Results   Component Value Date    CR 1.24 12/12/2021    CR 1.33 12/11/2021    CR 1.21 12/10/2021        Recent Labs   Lab 12/12/21  0606 12/11/21  0602 12/10/21  0706   WBC 4.9 4.0 5.5   HGB 11.7 13.1 12.7   HCT 35.9 38.9 40.4   MCV 94 94 98    175 146*

## 2021-12-12 NOTE — PROGRESS NOTES
Patient remains on HFNC 75-90%, 40 LPM for PEEP and Oxygen therapy tolerated well. SpO2 low to mid 90's, BS diminished. Will continue to monitor pt's respiratory status closely.    Alba Potter, RT, RT  12/11/2021 9:58 PM

## 2021-12-13 NOTE — CONSULTS
[facility]  Antimicrobial Stewardship Team (AST) Note             To:  Hospitalist  Unit:  [id number]  Patient Name: Sunita Barajas  Allergies: Sulfa drugs (?)     Brief Summary: 76 y.o. female admitted 12/8 acute hypoxic respiratory failure secondary to Covid-19 pneumonia. On dexamethasone  (Day 6//10), remdesivir (day 5/5 ) and baricitinib (day 4/14 or until discharge). Also, on meropenem (D3/5) for an ESBL E. Coli UTI       Assessment: Tmax = 98.1 (afebrile), WBC = 4.9, UA - WBC = 6, Leukocyte esterase = small, Nitrite = negative, color = clear, bacteria = many, CrCl = 43.8 mL/min     Current Antibiotic therapy: Meropenem 1 gram ever 12 hours (Day 3)    Clinical Features/Vital Signs (VS):    Culture Results:  Date Culture Site Organism   12/9 Urine >100 K/mL E.coli ESBL   12/8 Covid-19 (+) Positive   12/8 Blood culture x 2  No growth after 4 days               Imaging:         Recommendation/Interventions:   Urinalysis suggests likely colonization not infection. Consider stopping antibiotics even short of 5 days (i.e. DC meropenem now or after 3 days).    Discussed w/ ID Staff-Dr Jaclyn Piedra, PharmD, BCPS  Pharmacy Clinical Specialist  [phone]

## 2021-12-13 NOTE — PROGRESS NOTES
RT-Pt remains on HFNC 40LPM, 90% for PEEP/Oxygen support. Bs diminished. Will continue to assess and monitor.     Mart Rocha, RT on 12/13/2021 at 4:53 AM

## 2021-12-13 NOTE — PLAN OF CARE
"Patient is alert and oriented. VS WNL and documented on the FS. Lung sounds diminished in all lobes (continues on inhalers). Patient continues on high flow at FIO2 at 90% and 40 Liters O2. Active bowel sounds with LBM today. Patient wears a brief for periods of incontinence. SBA when up. SL. Patient continues on Meropenum for a UTI. Regular diet. Regular diet. Patient continues on COVID precautions.     Dexamethasone day 6/10  Remdesivir completed  Baricitinib day 4 of 14  Meropenem day 3.     Patient did have a 10 beat run of PSVT. Dr. Helms updated and no new orders.     /64 (BP Location: Left arm)   Pulse 70   Temp 97.4  F (36.3  C) (Oral)   Resp 22   Ht 1.549 m (5' 1\")   Wt 90.7 kg (200 lb)   SpO2 96%   BMI 37.79 kg/m      "

## 2021-12-13 NOTE — PROVIDER NOTIFICATION
Patient was sleeping in a semi fowlers position and O2 sats dropped to 85% on FiO2 90% and 40 L O2. Patient placed in the prone position and now sats at 99%. Dr. Helms updated.

## 2021-12-13 NOTE — PROGRESS NOTES
Patient remains on HFNC 65-75%, 40 LPM for PEEP and Oxygen therapy tolerated well. SpO2 low to mid 90's, BS diminished. Will continue to monitor pt's respiratory status closely.    Alba Potter, RT, RT  12/12/2021 6:57 PM

## 2021-12-13 NOTE — PROGRESS NOTES
[facility]  Hospitalist Progress Note  Patient Name: Sunita Barajas    MRN: [id number]  Provider: Ata Helms MD  12/13/21             Assessment and Plan:      Summary of Stay: Sunita Barajas is a 76 year old female with history of dyslipidemia, hypertension, hypothyroidism and depression.  She has not been vaccinated for COVID-19.  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 She had been having spells of intermittent coughing spells and intermittent loose stooling at least a week prior to this presentation. [facility] with worsening generalized weakness. She had had a fall event possibly 2 to 3 days prior to presentation and had been laying on her bathroom floor since.  She did not report to work so her employer contacted the family and they checked on her at home and found her on the floor.  She mentioned that she had not been eating any food for at least 2 days. ED work up showed temperature of 97.6, heart rate 98, blood pressure 143/77, respiratory 20 and oxygen saturation 95% on 10 L oxygen mask.  Labs show elevated creatinine of 1.5, , .  She tested positive for COVID-19.  CT showed pulmonary infiltrates consistent with COVID-19 pneumonia but no PE.  There was also a left ovarian cyst. She was admitted for management of COVID-19 pneumonia. She was treated with dexamethasone, remdesivir and enoxaparin. Baricitnib was added later.       Problem list:     Acute hypoxic respiratory failure secondary to COVID-19 pneumonia    - Not vaccinated    - Date of onset of symptoms: unclear maybe about 11/23/21     - Date of positive test:  12/8/21     - Continue oxygen: currently on HFNC 90% at 40 lpm; wean as tolerated    - Continue dexamethasone (day 6 of 10)    - Complete remdesivir 12/12    - Continue baricitinib (Day #4/14 or until discharge)    - No PE on CT chest    - Encouraged incentive spirometry    - Order am labs as  "needed     ESBL E. Coli UTI    - Plan on 5 days of Meropenem (day 3)     Acute on chronic renal failure    - per Care Everywhere her baseline Cr is about 1.1-1.2    - Cr today is 1.12     Acute rhabdomyolysis    - received IVF    - improved     Ovarian cyst     - seen on CT    - will need uutpatient ultrasound follow-up (TV and abdominal)     HTN    - not on meds at home    - BPs stable here    - previously was on lisinopril 20 mg daily- restarted here     Hypothyroidism    - not on meds    - previously was on synthroid 75mcg which was started here    - check TSH/Free T4     Depression    - not on meds     HLP    - not currently on meds    - previously on simvastatin 40mg daily- restarted here     Code Status: Full Code  DVT Prophylaxis: Enoxaparin (Lovenox) SQ  Disposition: Still on high O2 levels. Likely here for several more days        Interval History:      Feels ok. A little more fatigued today. No chest pain, abdominal pain, nausea, vomiting, diarrhea, or dysuria                  Physical Exam:      Last Vital Signs:  /55 (BP Location: Left arm)   Pulse 70   Temp 97.7  F (36.5  C) (Oral)   Resp 20   Ht 1.549 m (5' 1\")   Wt 90.7 kg (200 lb)   SpO2 94%   BMI 37.79 kg/m      Intake/Output Summary (Last 24 hours) at 12/13/2021 1502  Last data filed at 12/12/2021 1812  Gross per 24 hour   Intake 810 ml   Output --   Net 810 ml       GENERAL:  Comfortable at rest. Cooperative.  PSYCH: pleasant, oriented, No acute distress.  EYES: PERRLA, Normal conjunctiva.  HEART:  Regular rate and rhythm. No JVD. Pulses normal. No edema.  LUNGS:  A few coarse sounds to auscultation, slightly labored respiratory effort.  ABDOMEN:  Soft, no hepatosplenomegaly, normal bowel sounds.  EXTREMETIES: No clubbing, cyanosis or ischemia  SKIN:  Dry to touch, No rash.           Medications:      All current medications were reviewed.         Data:      All new lab and imaging data was reviewed.   Labs:       Lab Results "   Component Value Date     12/12/2021     12/11/2021     12/10/2021    Lab Results   Component Value Date    CHLORIDE 112 12/12/2021    CHLORIDE 109 12/11/2021    CHLORIDE 108 12/10/2021    Lab Results   Component Value Date    BUN 36 12/12/2021    BUN 40 12/11/2021    BUN 35 12/10/2021      Lab Results   Component Value Date    POTASSIUM 4.3 12/12/2021    POTASSIUM 4.0 12/11/2021    POTASSIUM 4.0 12/10/2021    Lab Results   Component Value Date    CO2 20 12/12/2021    CO2 20 12/11/2021    CO2 19 12/10/2021    Lab Results   Component Value Date    CR 1.12 12/13/2021    CR 1.24 12/12/2021    CR 1.33 12/11/2021        Recent Labs   Lab 12/12/21  0606 12/11/21  0602 12/10/21  0706   WBC 4.9 4.0 5.5   HGB 11.7 13.1 12.7   HCT 35.9 38.9 40.4   MCV 94 94 98    175 146*

## 2021-12-13 NOTE — PLAN OF CARE
For VS and complete assessments, please see documentation in flowsheets.     Pertinent shift assessments: VSS, bp 84/38 and 90/36, paged and gave 500mL bolus of NS, recheck 109/63. A&Ox4. Transfers SBA. Denies pain. Needs ques to turn on side/belly to improve oxygen, was able to prone most of the night and kept O2 sats up better. LS dim. O2 sats at 90-96 on 40L HFNC.Tele SR HR 70's. PIV saline locked between remdesivr and Meropenem infusions. Getting remdesivir, decadron, lovenox and baricitinib.

## 2021-12-14 NOTE — PROGRESS NOTES
[facility]  Hospitalist Progress Note  Patient Name: Sunita Barajas    MRN: [id number]  Provider: Fred Eaton MD  12/13/21             Assessment and Plan:      Summary of Stay: Sunita Barajas is a 76 year old female with history of dyslipidemia, hypertension, hypothyroidism and depression.  She has not been vaccinated for COVID-19.  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 She had been having spells of intermittent coughing spells and intermittent loose stooling at least a week prior to this presentation. She presented to the [facility] ED on 12/8/21 with worsening generalized weakness. She had had a fall event possibly 2 to 3 days prior to presentation and had been laying on her bathroom floor since.  She did not report to work so her employer contacted the family and they checked on her at home and found her on the floor.  She mentioned that she had not been eating any food for at least 2 days. ED work up showed temperature of 97.6, heart rate 98, blood pressure 143/77, respiratory 20 and oxygen saturation 95% on 10 L oxygen mask.  Labs show elevated creatinine of 1.5, , .  She tested positive for COVID-19.  CT showed pulmonary infiltrates consistent with COVID-19 pneumonia but no PE.  There was also a left ovarian cyst. She was admitted for management of COVID-19 pneumonia. She was treated with dexamethasone, remdesivir and enoxaparin. Baricitnib was added later.       Problem list:     Acute hypoxic respiratory failure secondary to COVID-19 pneumonia    - Not vaccinated    - Date of onset of symptoms: unclear maybe about 11/23/21     - Date of positive test:  12/8/21     - Continue oxygen: currently on HFNC 90% at 40 lpm; wean as tolerated: turning down to 85% today    - Continue dexamethasone (day 7 of 10)    - Completed remdesivir on 12/12    - Continue baricitinib (Day #5/14 or until discharge)    - No PE on CT chest    - Encouraged incentive spirometry    -  "OOB with nursing     ESBL E. Coli UTI    - Plan on 5 days of Meropenem (day 4)     Acute on chronic renal failure    - per Care Everywhere her baseline Cr is about 1.1-1.2    - Cr today is 1.16     Acute rhabdomyolysis    - received IVF    - improved     Ovarian cyst     - seen on CT    - will need outpatient ultrasound follow-up (TV and abdominal)     HTN    - not on meds at home    - BPs stable here    - previously was on lisinopril 20 mg daily- restarted here     Hypothyroidism    - not on meds    - previously was on synthroid 75mcg which was started here    - check TSH/Free T4     Depression    - not on meds     HLP    - not currently on meds    - previously on simvastatin 40mg daily- restarted here     Called and updated her son Diego  No labs needed in am    Code Status: Full Code  DVT Prophylaxis: Enoxaparin (Lovenox) SQ  Disposition: Still on high O2 levels. Likely here for several more days        Interval History:      Feels ok. No complaints except some ongoing suprapubic discomfort. No chest pain, abdo pain, n/v/d. Using IS                Physical Exam:      Last Vital Signs:  BP (!) 152/73 (BP Location: Right arm)   Pulse 88   Temp (!) 96.2  F (35.7  C) (Oral)   Resp 22   Ht 1.549 m (5' 1\")   Wt 90.7 kg (200 lb)   SpO2 96%   BMI 37.79 kg/m      Intake/Output Summary (Last 24 hours) at 12/13/2021 1502  Last data filed at 12/12/2021 1812  Gross per 24 hour   Intake 810 ml   Output --   Net 810 ml       GENERAL:  Comfortable at rest. Cooperative.  PSYCH: pleasant, oriented, No acute distress.  EYES: PERRLA, Normal conjunctiva.  HEART:  Regular rate and rhythm. No JVD. Pulses normal. No edema.  LUNGS:  Occasional crackles in bases  ABDOMEN:  Soft, no hepatosplenomegaly, normal bowel sounds.  EXTREMETIES: No clubbing, cyanosis or ischemia  SKIN:  Dry to touch, No rash.           Medications:      All current medications were reviewed.         Data:      All new lab and imaging data was reviewed. "   Labs:       Lab Results   Component Value Date     12/12/2021     12/11/2021     12/10/2021    Lab Results   Component Value Date    CHLORIDE 112 12/12/2021    CHLORIDE 109 12/11/2021    CHLORIDE 108 12/10/2021    Lab Results   Component Value Date    BUN 36 12/12/2021    BUN 40 12/11/2021    BUN 35 12/10/2021      Lab Results   Component Value Date    POTASSIUM 4.3 12/12/2021    POTASSIUM 4.0 12/11/2021    POTASSIUM 4.0 12/10/2021    Lab Results   Component Value Date    CO2 20 12/12/2021    CO2 20 12/11/2021    CO2 19 12/10/2021    Lab Results   Component Value Date    CR 1.12 12/13/2021    CR 1.24 12/12/2021    CR 1.33 12/11/2021        Recent Labs   Lab 12/12/21  0606 12/11/21  0602 12/10/21  0706   WBC 4.9 4.0 5.5   HGB 11.7 13.1 12.7   HCT 35.9 38.9 40.4   MCV 94 94 98    175 146*

## 2021-12-14 NOTE — PROGRESS NOTES
Pt remains on HFNC 40 L 85%. Skin integrity is good/intact. Pt tolerating well. BS diminished. RT will monitor.    Nikky Ksesler, RT

## 2021-12-14 NOTE — PROVIDER NOTIFICATION
MD Notification    Notified Person: MD    Notified Person Name:  Angelito    Notification Date/Time: 12/14/2021 0929     Notification Interaction: Vocera page    Purpose of Notification: Patient c/o bladder pain. Okay for PRN pyridium?    Orders Received: Patient unable to have pyridium due to creatinine clearance level. PRN oxycodone ordered by MD.    Comments:

## 2021-12-14 NOTE — PLAN OF CARE
Patient sats after getting up and ambulating to the bathroom dropped to 70%. Patient stayed around the 80's when she was back in bed. Increased FIO2 to 100 and O2 was at 40 liters and still remained around low to mid 80%. Patient then proned and now able to keep O2 >90%. Took 30 min to get patient back to 90% on the FIO2 100 and 40 liters of O2. Dr. Eaton updated.

## 2021-12-14 NOTE — PROGRESS NOTES
The HFNC was applied to the pt @ 40 LPM and 90% for PEEP therapy.  Skin looks good and intact. RT will continue to monitor and assess patient's respiratory status and needs.    Kalyani Ibarra, RT on 12/13/2021 at 7:03 PM

## 2021-12-14 NOTE — PLAN OF CARE
"  Blood pressure 124/59, pulse 86, temperature 97.9  F (36.6  C), temperature source Oral, resp. rate 22, height 1.549 m (5' 1\"), weight 90.7 kg (200 lb), SpO2 95 %.      Neuro:    Patient initially found quite confused, slow to respond, agitated when attempting to replace tubes, lines which she had taken off.  Pulm:      LS diminished. Patient on Hi Flow, 90%,  40 liters.   Patient has taken off high flow nasal cannula and found with extremely low sats of 73-80% three times this shift.    Patient further intermittently removes oximetry probe making it difficult to determine actual oxygen recovery and maintenance.    CV:         WDL  Monitor per tele tech:  SR  HR 80s.    GI:          Patient tolerating evening meal tray without nausea or emesis.  :        Purewick placed for incontinence as patient will not use call light for assistance due to confusion  Activity:  Patient is an assist of 1-2 with gait-belt, Patient found out of bed x 3, further will not maintain prone positioning  at this time.    Plan:      Continue to provide supportive cares.  Patient needs close observation due to safety issues, intermittent confusion possibly due to low sats,  hypoxia.  Covid Isolation continues  "

## 2021-12-14 NOTE — CONSULTS
Pt admitted with COVID pneumonia, noted to have unplanned readmission risk of 20%. Care Coordination team is following; patient has no discharge needs at this time. CM will continue to monitor for home oxygen needs closer to discharge.     RN CC will continue to follow for discharge planning.    Liliane Kessler RN, BSN, CPHN, CM  Inpatient Care Coordination - [facility]  [phone]

## 2021-12-14 NOTE — PLAN OF CARE
Temp: 97.7  F (36.5  C) Temp src: Oral BP: (!) 159/65 Pulse: 84   Resp: 20 SpO2: 96 % O2 Device: High Flow Nasal Cannula (HFNC) Oxygen Delivery: 40 LPM     Neuro: Confused at times  Cardiac:  WNL; Per tele tech: Sinus Rhythm HR 70s-80s  Lungs:  LS diminished, pt on HFNC 40 L 85%. Skin integrity is good/intact. Pt tolerating well.  GI: WNL  :  Purewick  Pain: Denies  IV:  PIV; SL  Meds:    Mirepenem  Labs/tests:    Diet: Regular  Activity: Ax1 GB  Misc: Covid special precautions    Pt is Ax1 w/ GB in room. Pt was confused at times during the shift. Pt was found trying to crawl out of bed. Pt continues to be on COVID precautions.        Dexamethasone day 7/10  Remdesivir completed  Baricitinib day 5 of 14  Meropenem day 4.

## 2021-12-15 NOTE — PROGRESS NOTES
CLINICAL NUTRITION SERVICES - REASSESSMENT NOTE      Recommendations:   Diet per MD.  Add-back 4 oz Ensure offerings with meals TID.    Reweigh during admit as able.    Ongoing nutrition recommendations per MD.  Per discussion with MD, hopeful for PO push/encouragement.  Concern for prolonged period of inadequate oral intake during admit.  If any change in respiratory needs/if not able to consistently take PO, will need to readdress nutrition plan.       MALNUTRITION   % Weight Loss: Unable to determine without reweigh  % Intake:  <75% for > 7 days (moderate malnutrition)  Subcutaneous Fat Loss:  Unable to complete  Muscle Loss: Unable to complete  Fluid Retention: None documented    Malnutrition Diagnosis: Unable to determine due to lack of NFPE and wt trends during admit          EVALUATION OF PROGRESS TOWARD GOALS   Diet: Regular, Ensure w/ meals BID    Intake/Tolerance:  Based on last RD assessment began having increase in respiratory needs on 12/09, requiring HFNC since this time until yesterday and was bipap dependent until this AM.  NPO since yesterday as a result, previously on a regular diet and again advanced this AM.  Also noted to be confused/lethargic at times.  Virtually no flowsheet recordings during admit.  Had been ordering meals BID on average.  Meals quite small at times lacking in total calories/protein, standard trays appear to be ordered by nursing as well at times.  Unclear supplement contribution.  Overall it is likely she has been meeting <75% needs (or less) during admit.  Per discussion with MD, many Ensure piled up in room.  Discussed PO push as able/safe with MD and RN, adding back Ensure offerings.    Barriers to PO intakes including: Largely limited by respiratory needs and suspect degree of mentation, likely decreased appetite combination in the setting of COVID.        ASSESSED NUTRITION NEEDS PER APPROVED PRACTICE GUIDELINES:     Dosing Weight 91 kg   Estimated Energy Needs: 20-25  Kcal/Kg  Justification: maintenance  Estimated Protein Needs: 1-1.2 g pro/Kg  Justification: preservation of lean body mass  Estimated Fluid Needs: per MD      NEW FINDINGS:   - Medications reviewed including:     aspirin  81 mg Oral Daily     baricitinib  2 mg Oral Daily     dexamethasone  6 mg Intravenous Daily     enoxaparin ANTICOAGULANT  40 mg Subcutaneous Q24H     famotidine  20 mg Intravenous Q24H     FLUoxetine  60 mg Oral Daily     ipratropium-albuterol  2 puff Inhalation 4x Daily     levothyroxine  75 mcg Oral Daily     lisinopril  20 mg Oral Daily     meropenem  1 g Intravenous Q12H     simvastatin  40 mg Oral At Bedtime     sodium chloride (PF)  3 mL Intracatheter Q8H        - MEDICATION INSTRUCTIONS -       - MEDICATION INSTRUCTIONS -       - MEDICATION INSTRUCTIONS -        - Labs reviewed including:  Electrolytes  Potassium (mmol/L)   Date Value   12/12/2021 4.3   12/11/2021 4.0   12/10/2021 4.0     Phosphorus (mg/dL)   Date Value   12/08/2021 4.0    Blood Glucose  Glucose (mg/dL)   Date Value   12/12/2021 131 (H)   12/11/2021 109 (H)   12/10/2021 129 (H)   12/09/2021 130 (H)   12/08/2021 120 (H)     GLUCOSE BY METER POCT (mg/dL)   Date Value   12/15/2021 139 (H)    Inflammatory Markers  CRP Inflammation (mg/L)   Date Value   12/12/2021 41.8 (H)   12/11/2021 72.1 (H)   12/10/2021 102.0 (H)     WBC Count (10e3/uL)   Date Value   12/12/2021 4.9   12/11/2021 4.0   12/10/2021 5.5     Albumin (g/dL)   Date Value   12/12/2021 2.2 (L)   12/08/2021 3.0 (L)      Magnesium (mg/dL)   Date Value   12/08/2021 2.6 (H)     Sodium (mmol/L)   Date Value   12/12/2021 138   12/11/2021 137   12/10/2021 134    Renal  Urea Nitrogen (mg/dL)   Date Value   12/12/2021 36 (H)   12/11/2021 40 (H)   12/10/2021 35 (H)     Creatinine (mg/dL)   Date Value   12/15/2021 1.11 (H)   12/14/2021 1.16 (H)   12/13/2021 1.12 (H)     Additional  Ketones Urine (mg/dL)   Date Value   12/09/2021 10  (A)        -  Weight trending reviewed and no  updated weight since admit for comparison:  Vitals:    12/08/21 2336   Weight: 90.7 kg (200 lb)     - Stooling patterns reviewed.      Previous Goals:   Patient to consume at least 50-75% of meals or supplements TID.  Evaluation: Unable to evaluate without flowsheet recordings, though suspect largely not met based on clinical course and many missed meals    Previous Nutrition Diagnosis:   Predicted inadequate nutrient intake (energy/protein) related to potential for decline in PO intakes pending COVID clinical course, respiratory needs.  Evaluation: Declining      CURRENT NUTRITION DIAGNOSIS  Inadequate oral intake related to respiratory needs, mentation, and suspect decreased appetite in the setting of COVID+ trajectory as evidenced by meeting <75% needs (or less) during 7 day admit, now requiring bipap.       INTERVENTIONS  Recommendations / Nutrition Prescription  Diet per MD.  Add-back 4 oz Ensure offerings with meals TID.    Reweigh during admit as able.    Ongoing nutrition recommendations per MD.  Per discussion with MD, hopeful for PO push/encouragement.  Concern for prolonged period of inadequate oral intake during admit.  If any change in respiratory needs/if not able to consistently take PO, will need to readdress nutrition plan.      Implementation  Medical Food Supplement: As above.    Goals  Patient to consume at least 50-75% meals/supplements w/in the next 24-48 hrs vs need for consideration of nutrition support interventions per MD discretion.        MONITORING AND EVALUATION:  Progress towards goals will be monitored and evaluated per protocol and Practice Guidelines      Kalyani Ashraf RDN, LD  Clinical Dietitian  3rd floor/ICU: [phone]  All other floors: [phone]  Weekend/holiday: [phone]

## 2021-12-15 NOTE — PROGRESS NOTES
A BiPAP of  16/10 @ 100% was applied to the pt via the mask for an increase in WOB and Hypoxia. The bridge of the nose looks good at this time. Pt is tolerating it well. SpO2 high 90's, BS diminished. Will continue to monitor and assess the pt's current respiratory status and needs.    Alba Potter, RT, RT  12/14/2021 6:59 PM

## 2021-12-15 NOTE — PROGRESS NOTES
A BiPAP of  16/10 @ 100% was applied to the pt via the mask for an increase in WOB and/or SOB.  Skin is intact. Pt is tolerating it well. Will continue to monitor and assess the pt's current respiratory status and needs.    Mart Rocha RT on 12/15/2021 at 4:23 AM

## 2021-12-15 NOTE — PROGRESS NOTES
This afternoon, pt starting desatting into high 80s at rest. Updated RT and upon their assessment patient has been put back on BiPAP.

## 2021-12-15 NOTE — PLAN OF CARE
Patient lethargic, oriented x4. Vitals are Temp: 98.1  F (36.7  C) Temp src: Axillary BP: 131/56 Pulse: 77   Resp: 16 SpO2: 96 % BiPAP. Denies pain. Denies shortness of breath. NPO with ice chips. Denies cardiac symptoms and GI symptoms. SR 60s on tele. Rojas patent with adequate output. Patient moving independently in bed. Continuing with merropenem for UTI. IVs saline locked x2. Sitter at bedside. Continuing with supportive cares and symptom management.

## 2021-12-15 NOTE — PLAN OF CARE
"/66 (BP Location: Right arm)   Pulse 66   Temp 97.1  F (36.2  C) (Axillary)   Resp 26   Ht 1.549 m (5' 1\")   Wt 90.7 kg (200 lb)   SpO2 95%   BMI 37.79 kg/m      Neuro: WDL  Cardiac: WDL  Lungs: ex., diminished, high flow and Bipap  GI: WDL  : WDL  Pain: Denies  IV: Saline locked  Meds: Covid meds  Labs/tests: Covid positive  Diet: Regular  Activity: Ax1  Misc: Patient has been back and forth between high flow and Bipap  Plan: Will continue to monitor and provide cares.     "

## 2021-12-15 NOTE — PLAN OF CARE
[id number]: Sleeping between cares. VSS on bipap. LS diminished. BETTS. Prior to bipap, patient was desating to mid 70s on 40L O2 via high flow NC and FiO2 85% with any activity. COVID precautions maintained. NPO except for meds/ice chips. Rojas placed for retention. IV SL x 2. Nursing will continue to monitor.

## 2021-12-15 NOTE — PLAN OF CARE
"[id number]    Inpatient Progress Note:    /68 (BP Location: Right arm)   Pulse 69   Temp 97.5  F (36.4  C) (Axillary)   Resp 23   Ht 1.549 m (5' 1\")   Wt 90.7 kg (200 lb)   SpO2 93%   BMI 37.79 kg/m       Pt on BiPAP satting 90-93%. Occasionally desats tto 88-89% for brief moments. On continuous pulse ox. Appears comfortable, sleeping. Lung sounds diminished. NPO, except meds and ice chips. Rojas in place and draining adequate output.  PIV SL x2. Sitter at bedside for safety. SR with hr 82. Plan: meropenem IV for UTI, decadron, lovenox, monitor respiratory status.           "

## 2021-12-15 NOTE — PROGRESS NOTES
Update given to danette Cortez about bipap. Ipad in place at bedside if family wants to facetime patient.

## 2021-12-15 NOTE — PLAN OF CARE
Care from 7a-3p  Pt admitted for COVID PNA    Patient weak today, but alert. oriented x4. Vitals are Temp: 97.1  F (36.2  C) Temp src: Axillary BP: 135/66 Pulse: 66   Resp: 23 SpO2: 94 %  Pt taken off BiPAP today and transitioned back to hi flow nasal cannula.  Denies pain. Denies shortness of breath. NPO with ice chips, regular once BiPAP came off. Denies cardiac symptoms and GI symptoms. SR 60s on tele. Rojas patent with adequate output. Patient moving independently in bed. Continuing with merropenem for UTI. IVs saline locked x2.Sitter pulled as biPAP was taken off. Continuing with supportive cares and symptom management.

## 2021-12-16 NOTE — PROGRESS NOTES
Pt remains on BIPAP 16/10 on 100% FIO2.     Pt's BS are diminished with sat's in the low 90's.     Skin barrier applied to the bridge of pt's nose, no skin issues were noted.    Attempted to switch pt from BIPAP to HFNC today, to wean, give pt a break from the BIPAP and allow her to eat.  Pt couldn't maintain sat's about 85% when on 78F175% FIO2 on the HFNC.  Pt subsequently switched back to full support on the BIPAP 16/10 on 100% FIO2.    Will attempt to trial pt on HFNC again today and tomorrow.     Will continue to follow and assess and make changes as needed.    Leeann Monreal, RT on 12/16/2021 at 2:52 PM

## 2021-12-16 NOTE — PLAN OF CARE
Care from 0730 to 1500     Aox4. Neuro WDL. Denies pain. Ax1-2 depending on weakness w walker. COVID +. Was on BiPAP at start of switched. RT attemted Hi-Demond, pt didn't tolerate. Back to BiPAP. LR started, held for CT scan. PIV SL jeannie. Regular diet. Pt is receiving ensure when able for nutritional support.    Refer to flowsheet for assessment details.

## 2021-12-16 NOTE — PROGRESS NOTES
XC    Called for sats mid 80s on max bipap. CRNA replaced the probe and now 93% on full bipap support. She is laying on her side. Could attempt prone positioning if worsens. Stop IVF. Consider lasix. Otherwise continue current mgmt. Discussed with KENYON.    Stalin Wolf, DO  December 16, 2021

## 2021-12-16 NOTE — PROVIDER NOTIFICATION
12/16/21 0200   Mode: CPAP/ BiPAP/ AVAPS/ AVAPS AE   CPAP/BiPAP/ AVAPS/ AVAPS AE Mode BiPAP S/T   CPAP/BiPAP/Settings   $BIPAP/CPAP Therapy continuous   IPAP/EPAP (cmH2O) 16/10   Rate (breaths/min) 16   Oxygen (%) 100   Timed Inspiration (sec) 0.9   IPAP RISE  Settings (V60) 2   CPAP/BiPAP Patient Parameters   IPAP (cm H2O) 16 cmH2O   EPAP (cm H2O) 10 cmH2O   Pressure Support (cm H2O) 6 cmH2O   RR Total (breaths/min) 22 breaths/min   Vt (mL) 352 mL   Minute Ventilation (L/min) 7.7 L/min   Peak Inspiratory Pressure (cm H2O) 16 cmH2O   Pt.  Leak (L/min) 85 L/min

## 2021-12-16 NOTE — PLAN OF CARE
"Care from [id number]    Inpatient Progress Note:  For complete assessment see flow sheet documentation.    Vital signs:  Temp: 99  F (37.2  C) Temp src: Axillary BP: 122/61 Pulse: 74   Resp: 24 SpO2: 92 % O2 Device: BiPAP/CPAP Oxygen Delivery: 60 LPM Height: 154.9 cm (5' 1\") Weight: 90.7 kg (200 lb)  Estimated body mass index is 37.79 kg/m  as calculated from the following:    Height as of this encounter: 1.549 m (5' 1\").    Weight as of this encounter: 90.7 kg (200 lb).    Orientation: A&O x 4  Neuro: WNL  Pain status: Denies having any pain  Activity: Assist of 2  Peripheral edema: None noted  Resp: Covid positive. Maxed out on bipap. Laying on side. O2 sats upper 80's to low 90's. Paged hospitalist and the following was recommended/discussed:  Called for sats mid 80s on max bipap. CRNA replaced the probe and now 93% on full bipap support. She is laying on her side. Could attempt prone positioning if worsens. Stop IVF. Consider lasix. Otherwise continue current mgmt. Discussed with RN.  Cardiac: WNL  GI: WNL  :  Baxter cath 1000 ml emptied  Skin: See flow sheet  LDA: PIV, baxter cath  Infusions: PIV NS locked  Discharge Plan: TBD. Pt needs to wean off bipap, high flow and just over all oxygen needs while remaining stable. Will continue to monitor patient.    Will continue to monitor and provide cares.     Marguerite Stone RN   "

## 2021-12-16 NOTE — PROVIDER NOTIFICATION
Page sent to hospitalist regarding below:  Pt maxed out on bipap 100%. O2 sats remains consistently mid 80's. RT updated. Please advise.

## 2021-12-16 NOTE — PROGRESS NOTES
[facility]  Hospitalist Progress Note  Patient Name: Suniat Barajas    MRN: [id number]  Provider: Fred Eaton MD  12/16/2021             Assessment and Plan:      Summary of Stay: Sunita Barajas is a 76 year old female with history of dyslipidemia, hypertension, hypothyroidism and depression.  She has not been vaccinated for COVID-19.  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 She had been having spells of intermittent coughing spells and intermittent loose stooling at least a week prior to this presentation. She presented to the [facility] ED on 12/8/21 with worsening generalized weakness. She had had a fall event possibly 2 to 3 days prior to presentation and had been laying on her bathroom floor since.  She did not report to work so her employer contacted the family and they checked on her at home and found her on the floor.  She mentioned that she had not been eating any food for at least 2 days. ED work up showed temperature of 97.6, heart rate 98, blood pressure 143/77, respiratory 20 and oxygen saturation 95% on 10 L oxygen mask.  Labs show elevated creatinine of 1.5, , .  She tested positive for COVID-19.  CT showed pulmonary infiltrates consistent with COVID-19 pneumonia but no PE.  There was also a left ovarian cyst. She was admitted for management of COVID-19 pneumonia. She was treated with dexamethasone, remdesivir and enoxaparin. Baricitnib was added later. Was clinically improving now has increasing oxygen needs over the past 48 hours     Problem list:     Acute hypoxic respiratory failure secondary to COVID-19 pneumonia    - Not vaccinated    - Date of onset of symptoms: unclear maybe about 11/23/21     - Date of positive test:  12/8/21     - Continue dexamethasone (day 9 of 10)    - Completed remdesivir on 12/12    - Continue baricitinib (Day #7/14 or until discharge)    - No PE on CT chest on admission (12/8)    - on 12/14 overnight: needed BiPaP     "- initially the severe desats were related to exertion (getting OOB). Was able to take off BiPAP on 12/15 am, but had to go back on in the afternoon    - inflammatory markers had come down nicely during her stay, but now are elevated again. She has been on DVT prophylaxis with lovenox, but I have a concern for PE as the cause of her deterioration. Will obtain STAT CT chest     ESBL E. Coli UTI    - Plan on 5 days of Meropenem (day 5): stop after today (12/15)    - completed course     Urinary retention    - required baxter placement (12/15)    - >1500 out with baxter placement    - cont for now    Acute on chronic renal failure    - per Care Everywhere her baseline Cr is about 1.1-1.2    - Cr today is 1.16     Acute rhabdomyolysis    - received IVF    - improved     Ovarian cyst     - seen on CT    - will need outpatient ultrasound follow-up (TV and abdominal)     HTN    - not on meds at home    - BPs stable here    - previously was on lisinopril 20 mg daily- restarted here     Hypothyroidism    - not on meds    - previously was on synthroid 75mcg which was started here    - checked TSH/Free T4: normal     Depression    - not on meds     HLP    - not currently on meds    - previously on simvastatin 40mg daily- restarted here     Severe protein malnutrition    - due to poor caloric intake due to underlying infection and need for BiPAP    Called and updated her son Diego    Code Status: Full Code  DVT Prophylaxis: Enoxaparin (Lovenox) SQ  Disposition: Still on high O2 levels. Likely here for several more days        Interval History:      Patient on BiPAP from overnight. She states she is hungry. No new complaints         Physical Exam:      Last Vital Signs:  /57   Pulse 67   Temp 99  F (37.2  C) (Axillary)   Resp 23   Ht 1.549 m (5' 1\")   Wt 90.7 kg (200 lb)   SpO2 92%   BMI 37.79 kg/m      Intake/Output Summary (Last 24 hours) at 12/13/2021 1502  Last data filed at 12/12/2021 1812  Gross per 24 hour "   Intake 810 ml   Output --   Net 810 ml       GENERAL:  Comfortable at rest. Cooperative.  PSYCH: pleasant, oriented, No acute distress.  EYES: PERRLA, Normal conjunctiva.  HEART:  Regular rate and rhythm. No JVD. Pulses normal. No edema.  LUNGS:  Left >Right crackles to mid lung  ABDOMEN:  Soft, no hepatosplenomegaly, normal bowel sounds.  EXTREMETIES: No clubbing, cyanosis or ischemia  SKIN:  Dry to touch, No rash.           Medications:      All current medications were reviewed.         Data:      All new lab and imaging data was reviewed.   Labs:       Lab Results   Component Value Date     12/12/2021     12/11/2021     12/10/2021    Lab Results   Component Value Date    CHLORIDE 112 12/12/2021    CHLORIDE 109 12/11/2021    CHLORIDE 108 12/10/2021    Lab Results   Component Value Date    BUN 36 12/12/2021    BUN 40 12/11/2021    BUN 35 12/10/2021      Lab Results   Component Value Date    POTASSIUM 4.3 12/12/2021    POTASSIUM 4.0 12/11/2021    POTASSIUM 4.0 12/10/2021    Lab Results   Component Value Date    CO2 20 12/12/2021    CO2 20 12/11/2021    CO2 19 12/10/2021    Lab Results   Component Value Date    CR 1.12 12/13/2021    CR 1.24 12/12/2021    CR 1.33 12/11/2021        Recent Labs   Lab 12/16/21  0532 12/12/21  0606 12/11/21  0602   WBC 7.9 4.9 4.0   HGB 12.0 11.7 13.1   HCT 37.9 35.9 38.9   MCV 96 94 94    177 175

## 2021-12-16 NOTE — PROGRESS NOTES
Pt placed on BIPAP for SOB, increased WOB and increased O2 needs.    Pt remains on BIPAP 16/10 100%.    Pt's BS are diminished with sat's in the low 90's.    Skin barrier applied to the bridge of pt's nose, no skin issues were noted.    Will continue to follow and assess and make changes as needed.    Leeann Monreal, RT on 12/15/2021 at 6:22 PM

## 2021-12-17 NOTE — PROGRESS NOTES
Respiratory Therapy Note    Patient seen on a BiPAP of 16/8 @ 100% via the mask for an increase in WOB and/or SOB. The bridge of the nose is protected with gel pad in place. Pt is tolerating it well. Ventilator on standby in room. Will continue to monitor and assess the pt's current respiratory status and needs.    Renee Christiansen, RT  4:44 PM December 17, 2021

## 2021-12-17 NOTE — PLAN OF CARE
Patient is A&Ox4, up with assist x2, denies pain at this time, baxter in place, patient on Bipap at 100%, continues to desat to 88-89% but then comes back up to 90-91%- repositioned multiple times, respiratory following,  IVF @ 50, NPO, patient had chest CT yesterday evening, patient anxious at times about oxygen status- reassurance given multiple times to patient, will continue to monitor      Temp: 96.9  F (36.1  C) Temp src: Axillary BP: 124/62 Pulse: 66   Resp: 16 SpO2: 92 % O2 Device: BiPAP/CPAP

## 2021-12-17 NOTE — PROCEDURES
[facility]    Triple Lumen PICC Placement    Date/Time: 12/17/2021 1:27 PM  Performed by: Barbara Minor RN  Authorized by: Torsten Juan MD   Indications: vascular access      UNIVERSAL PROTOCOL   Site Marked: No  Prior Images Obtained and Reviewed:  No  Required items: Required blood products, implants, devices and special equipment available    Patient identity confirmed:  Verbally with patient and arm band  Patient was reevaluated immediately before administering moderate or deep sedation or anesthesia (local only)  Confirmation Checklist:  Patient's identity using two indicators, relevant allergies, procedure was appropriate and matched the consent or emergent situation and correct equipment/implants were available  Time out: Immediately prior to the procedure a time out was called    Universal Protocol: the Joint Commission Universal Protocol was followed    Preparation: Patient was prepped and draped in usual sterile fashion    ESBL (mL):  2     ANESTHESIA    Local Anesthetic: Lidocaine 1% without epinephrine  Anesthetic Total (mL):  1      SEDATION    Patient Sedated: No        Preparation: skin prepped with ChloraPrep  Skin prep agent: skin prep agent completely dried prior to procedure  Sterile barriers: maximum sterile barriers were used: cap, mask, sterile gown, sterile gloves, and large sterile sheet  Hand hygiene: hand hygiene performed prior to central venous catheter insertion  Type of line used: Power PICC  Catheter type: triple lumen  Lumen type: valved  Catheter size: 5 Fr  Brand: Bard  Lot number: [id number]  Placement method: venipuncture, MST, ultrasound and tip confirmation system  Number of attempts: 1  Successful placement: yes  Orientation: right  Location: basilic vein  Arm circumference: adults 10 cm  Extremity circumference: 33.5  Visible catheter length: 2  Internal length: 38 cm  Total catheter length: 40  Dressing and securement: blood cleaned with  CHG, chlorhexidine patch applied, dressing applied, securement device, occlusive dressing applied and sterile dressing applied  Post procedure assessment: free fluid flow  PROCEDURE   Patient Tolerance:  Patient tolerated the procedure well with no immediate complicationsDescribe Procedure: ecg tip confirmation. Line is caj and good to use

## 2021-12-17 NOTE — PROGRESS NOTES
Inpatient progress note:    Pt AOx4. Up w Ax2. Denies pain. Rojas catheter in place. Pt on BiPAP at 100%. Desaturates on BiPAP to 87 - 90%. Sometimes up to 94% when upright. RT paged. 2 PIV SL (1 on Rt UE, 1 on Lt UE). Plans to transport to ICU for intubation.

## 2021-12-17 NOTE — PROGRESS NOTES
Patient O2 sats occasionally dropping to 88-89%, patient repositioned and head of bed raised, sats 90-91%.

## 2021-12-17 NOTE — PROGRESS NOTES
Assisted with transport of pt from OBS to ICU on BIPAP with HEPA filter inline no complications were noted.    Leeann Monreal, RT on 12/17/2021 at 11:41 AM

## 2021-12-17 NOTE — PLAN OF CARE
"INPATIENT NOTE [id number]    Patient alert and oriented, NPO - confirmed with RT NPO due to risk of aspiration and immediate desats w/o BiPap mask in place, assist x2 to pivot. LR @ 50. BiPap in use - RT made adjustments of FIO2 down to 75, pt did not tolerate - back to 16/10 FIO2 @ 100% with sats 90-93%, pt needs frequent reminders to keep head midline to prevent mask leak - HiFlow not appropriate at this time. See CT scan result/impression - provider to update patient's son on results tomorrow. Sitter outside of room, frequent rounding and continued cares provided.    /57 (BP Location: Right arm, Patient Position: Supine)   Pulse 66   Temp 97.5  F (36.4  C) (Axillary)   Resp 18   Ht 1.549 m (5' 1\")   Wt 90.7 kg (200 lb)   SpO2 93%   BMI 37.79 kg/m      Jonh Jolley RN on 12/16/2021  "

## 2021-12-17 NOTE — PROGRESS NOTES
[facility]  Hospitalist Progress Note  Patient Name: Sunita Barajas    MRN: [id number]  Provider: Fred Eaton MD  12/17/2021  Hospital Day 10           Assessment and Plan:      Summary of Stay: Sunita Barajas is a 76 year old female with history of dyslipidemia, hypertension, hypothyroidism and depression.  She has not been vaccinated for COVID-19.  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 She had been having spells of intermittent coughing spells and intermittent loose stooling at least a week prior to this presentation. She presented to the [facility] ED on 12/8/21 with worsening generalized weakness. She had had a fall event possibly 2 to 3 days prior to presentation and had been laying on her bathroom floor since.  She did not report to work so her employer contacted the family and they checked on her at home and found her on the floor.  She mentioned that she had not been eating any food for at least 2 days. ED work up showed temperature of 97.6, heart rate 98, blood pressure 143/77, respiratory 20 and oxygen saturation 95% on 10 L oxygen mask.  Labs show elevated creatinine of 1.5, , .  She tested positive for COVID-19.  CT showed pulmonary infiltrates consistent with COVID-19 pneumonia but no PE.  There was also a left ovarian cyst. She was admitted for management of COVID-19 pneumonia. She was treated with dexamethasone, remdesivir and enoxaparin. Baricitnib was added later. Was clinically improving now has increasing oxygen needs over the past 72 hours. Repeat CT on 12/16 showed no PE but worsening covid changes and pneumomediastinum     Problem list:     Acute hypoxic respiratory failure secondary to COVID-19 pneumonia    - Not vaccinated    - Date of onset of symptoms: unclear maybe about 11/23/21     - Date of positive test:  12/8/21     - Continue dexamethasone (day 10 of 10)    - Completed remdesivir on 12/12    - Continue baricitinib (Day #8/14 or  until discharge)    - No PE on CT chest on admission (12/8)    - on 12/14 overnight: needed BiPaP    - initially the severe desats were related to exertion (getting OOB). Was able to take off BiPAP on 12/15 am, but had to go back on in the afternoon nad has been on BiPAP since    - inflammatory markers had come down nicely during her stay, but now are elevated again. She has been on DVT prophylaxis with lovenox    - repeat CT was done on 12/16 which showed no PE, but worsening ground glass opacities and pneumomediastinum    - today (12/17) she is maxed out on BiPAP. Will need intubation. Will move to the ICU. I have spoken to the family and that provider    ESBL E. Coli UTI    - Plan on 5 days of Meropenem (day 5): stop after today (12/15)    - completed course     Urinary retention    - required baxter placement (12/15)    - >1500 out with baxter placement    - cont for now    Acute on chronic renal failure    - per Care Everywhere her baseline Cr is about 1.1-1.2    - Cr today is 1.16     Acute rhabdomyolysis    - received IVF    - improved     Ovarian cyst     - seen on CT    - will need outpatient ultrasound follow-up (TV and abdominal)     HTN    - not on meds at home    - BPs stable here    - previously was on lisinopril 20 mg daily- restarted here     Hypothyroidism    - not on meds    - previously was on synthroid 75mcg which was started here    - checked TSH/Free T4: normal     Depression    - not on meds     HLP    - not currently on meds    - previously on simvastatin 40mg daily- restarted here     Severe protein malnutrition    - due to poor caloric intake due to underlying infection and need for BiPAP    Called and updated her son Diego    Code Status: Full Code  DVT Prophylaxis: Enoxaparin (Lovenox) SQ  Disposition: Still on high O2 levels. Likely here for several more days        Interval History:      Patient on BiPAP from overnight. Patient is obviously sad. She has been told with her increasing O2  "needs she will need to be intubated. She has spent the morning discussing this with her family. She wants to proceed with intubation. No new symptoms or complaints today. I have kept her family updated.         Physical Exam:      Last Vital Signs:  /57 (BP Location: Right arm)   Pulse 78   Temp 97.5  F (36.4  C) (Axillary)   Resp 19   Ht 1.549 m (5' 1\")   Wt 90.7 kg (200 lb)   SpO2 93%   BMI 37.79 kg/m      Intake/Output Summary (Last 24 hours) at 12/13/2021 1502  Last data filed at 12/12/2021 1812  Gross per 24 hour   Intake 810 ml   Output --   Net 810 ml       GENERAL:  Comfortable at rest. Cooperative.  PSYCH: pleasant, oriented, No acute distress.  EYES: PERRLA, Normal conjunctiva.  HEART:  Regular rate and rhythm. No JVD. Pulses normal. No edema.  LUNGS:  Left >Right crackles to mid lung  ABDOMEN:  Soft, no hepatosplenomegaly, normal bowel sounds.  EXTREMETIES: No clubbing, cyanosis or ischemia  SKIN:  Dry to touch, No rash.           Medications:      All current medications were reviewed.         Data:      All new lab and imaging data was reviewed.   Labs:       Lab Results   Component Value Date     12/12/2021     12/11/2021     12/10/2021    Lab Results   Component Value Date    CHLORIDE 112 12/12/2021    CHLORIDE 109 12/11/2021    CHLORIDE 108 12/10/2021    Lab Results   Component Value Date    BUN 36 12/12/2021    BUN 40 12/11/2021    BUN 35 12/10/2021      Lab Results   Component Value Date    POTASSIUM 4.3 12/12/2021    POTASSIUM 4.0 12/11/2021    POTASSIUM 4.0 12/10/2021    Lab Results   Component Value Date    CO2 20 12/12/2021    CO2 20 12/11/2021    CO2 19 12/10/2021    Lab Results   Component Value Date    CR 1.12 12/13/2021    CR 1.24 12/12/2021    CR 1.33 12/11/2021        Recent Labs   Lab 12/16/21  0532 12/12/21  0606 12/11/21  0602   WBC 7.9 4.9 4.0   HGB 12.0 11.7 13.1   HCT 37.9 35.9 38.9   MCV 96 94 94    177 175              "

## 2021-12-17 NOTE — PLAN OF CARE
ICU End of Shift Summary.  For vital signs and complete assessments, please see documentation flowsheets.     Pertinent assessments: Alert, oriented. Reports chest pain, increased with deep breath. 12 lead shows NSR. CXR done, results pending. Tele SR. MAPs >65. LS coarse, fine crackles to bases. Remains on BiPAP, FiO2 100%, no increase in WOB per pt. UOP avg 55mL/hr for day. No BM recorded since admission, pt does not recall last BM. Suppository given, request for bed pain at change of shift.   Major Shift Events: Transfer to ICU from OBS @ 1140 for planned intubation. Upon arrival to ICU, dania 96% and no minimal increase in WOB. Pt remains on BiPAP. PICC placed. BG 70/63. 25mL D50 given, started D5NS.  Plan (Upcoming Events): Intubate if respiratory status declines.  Discharge/Transfer Needs: TBD    Bedside Shift Report Completed : Y  Bedside Safety Check Completed: Y

## 2021-12-17 NOTE — PROVIDER NOTIFICATION
12/17/21 0300   Equipment   Device Serial Number [id number]   CPAP/BiPAP/Settings   $BIPAP/CPAP Therapy continuous   IPAP/EPAP (cmH2O) 16/8   Rate (breaths/min) 16   Oxygen (%) 100   Timed Inspiration (sec) 0.9   IPAP RISE  Settings (V60) 3   Bipap for peep therapy .

## 2021-12-18 NOTE — PLAN OF CARE
ICU End of Shift Summary.  For vital signs and complete assessments, please see documentation flowsheets.     Pertinent assessments: AO, reports pain to Chest, PRN dilaudid given with minimal improvement. Tele SR, MAPs >65. LS diminished. Inspiratory wheeze in upper lobes. Sats >90 while proned/side lying. UOP Avg 58mL/hr overnight. No BM +bowel sounds.   Major Shift Events: Self proning, Increased PEEP to 10.   Plan (Upcoming Events): Wean O2 as able, Intubate if respiratory status declines  Discharge/Transfer Needs: TBD    Bedside Shift Report Completed : Y  Bedside Safety Check Completed: Y

## 2021-12-18 NOTE — PROGRESS NOTES
RT note    Patient remains on a BiPAP of  16/10 @ 100%  via the mask for an increase in WOB and/or SOB.  The bridge of the nose protected with gel pad in place.Pt is tolerating it well. Placed patient in prone position.  Well tolerated.  Will continue to monitor and assess the pt's current respiratory status and needs.    Maryam Negrete, RT on 12/18/2021 at 12:55 AM

## 2021-12-18 NOTE — PROGRESS NOTES
[facility]  Hospitalist Progress Note  Patient Name: Sunita Barajas    MRN: [id number]  Provider: Mic Valdivia MD  Hospital Day 10           Assessment and Plan:      Summary of Stay: Sunita Barajas is a 76 year old female with history of dyslipidemia, hypertension, hypothyroidism and depression.  She has not been vaccinated for COVID-19.  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 She had been having spells of intermittent coughing spells and intermittent loose stooling at least a week prior to this presentation. She presented to the [facility] ED on 12/8/21 with worsening generalized weakness. She had had a fall event possibly 2 to 3 days prior to presentation and had been laying on her bathroom floor since.  She did not report to work so her employer contacted the family and they checked on her at home and found her on the floor.  She mentioned that she had not been eating any food for at least 2 days. ED work up showed temperature of 97.6, heart rate 98, blood pressure 143/77, respiratory 20 and oxygen saturation 95% on 10 L oxygen mask.  Labs show elevated creatinine of 1.5, , .  She tested positive for COVID-19.  CT showed pulmonary infiltrates consistent with COVID-19 pneumonia but no PE.  There was also a left ovarian cyst. She was admitted for management of COVID-19 pneumonia. She was treated with dexamethasone, remdesivir and enoxaparin. Baricitnib was added later.   Patient was having increasing oxygen needs over the past 72 hours. Repeat CT on 12/16 showed no PE but worsening covid changes and pneumomediastinum.      Problem list:     Acute hypoxic respiratory failure secondary to COVID-19 pneumonia    -Not vaccinated    -Date of onset of symptoms: unclear maybe about 11/23/21     -Date of positive test:  12/8/21     -Continue dexamethasone (day 10 of 10)    -Completed remdesivir on 12/12    -Continue baricitinib (Day #9/14 or until discharge)    -No  "PE on CT chest on admission (12/8)    -Continued to require BiPAP    -inflammatory markers had come down nicely during her stay, but now are elevated again.     -On DVT prophylaxis with lovenox    -repeat CT was done on 12/16 which showed no PE, but worsening ground glass opacities and pneumomediastinum    -On 12/17 she was maxed out on BiPAP and transferred to ICU for possible intubation.     -Currently remains on BiPAP with FiO2 100%, O2 sat  91-95%. Will continue to monitor    -Full Code    ESBL E. Coli UTI    - Plan on 5 days of Meropenem (day 5): stop after today (12/15)    - completed course     Urinary retention    - required baxter placement (12/15)    - Continue baxter catheter    Acute on chronic renal failure    - per Care Everywhere her baseline Cr is about 1.1-1.2    - Creatinine 0.87 today     Acute rhabdomyolysis    - received IVF    - improved with IV fluids     Ovarian cyst     - seen on CT    - will need outpatient ultrasound follow-up      Hypertension    - not on meds at home    - BPs stable here    - previously was on lisinopril 20 mg daily- restarted here     Hypothyroidism    - not on meds    - previously was on synthroid 75mcg which was started here    - checked TSH/Free T4: normal     Depression    - not on meds     HLP    - not currently on meds    - previously on simvastatin 40mg daily- restarted here     Severe protein malnutrition  - due to poor caloric intake due to underlying infection and need for BiPAP  -Consulted pharmacy/dietician to start TPN    Code Status: Full Code  DVT Prophylaxis: Enoxaparin (Lovenox) SQ  Disposition: Still on high O2 levels. Likely here for several more days        Interval History:      Patient remains on BiPAP. She stated that she is feeling comfortable on BiPAP and denies worsening shortness of breath. Denies chest pain or fever.          Physical Exam:      Last Vital Signs:  /66   Pulse 66   Temp 97.7  F (36.5  C)   Resp 25   Ht 1.549 m (5' 1\")  "  Wt 79 kg (174 lb 2.6 oz)   SpO2 91%   BMI 32.91 kg/m      Intake/Output Summary (Last 24 hours) at 12/13/2021 1502  Last data filed at 12/12/2021 1812  Gross per 24 hour   Intake 810 ml   Output --   Net 810 ml       GENERAL:  Comfortable at rest. Cooperative.  PSYCH: pleasant, oriented, No acute distress.  EYES: PERRLA, Normal conjunctiva.  HEART:  Regular rate and rhythm. No JVD. Pulses normal. No edema.  LUNGS:  Left >Right crackles to mid lung  ABDOMEN:  Soft, no hepatosplenomegaly, normal bowel sounds.  EXTREMETIES: No clubbing, cyanosis or ischemia  SKIN:  Dry to touch, No rash.           Medications:      All current medications were reviewed.         Data:      All new lab and imaging data was reviewed.   Labs:       Lab Results   Component Value Date     12/12/2021     12/11/2021     12/10/2021    Lab Results   Component Value Date    CHLORIDE 112 12/12/2021    CHLORIDE 109 12/11/2021    CHLORIDE 108 12/10/2021    Lab Results   Component Value Date    BUN 36 12/12/2021    BUN 40 12/11/2021    BUN 35 12/10/2021      Lab Results   Component Value Date    POTASSIUM 4.3 12/12/2021    POTASSIUM 4.0 12/11/2021    POTASSIUM 4.0 12/10/2021    Lab Results   Component Value Date    CO2 20 12/12/2021    CO2 20 12/11/2021    CO2 19 12/10/2021    Lab Results   Component Value Date    CR 1.12 12/13/2021    CR 1.24 12/12/2021    CR 1.33 12/11/2021        Recent Labs   Lab 12/18/21  0501 12/17/21  1246 12/16/21  0532   WBC 14.8* 15.0* 7.9   HGB 12.2 12.2 12.0   HCT 37.3 37.3 37.9   MCV 95 96 96    278 284

## 2021-12-18 NOTE — CONSULTS
CLINICAL NUTRITION SERVICES - BRIEF NOTE    - Refer to previous RD notes.  - Required ICU transfer, remains bipap dependent.  - Need for nutrition plan as now day 10 of inadequate oral intake d/t respiratory needs.  Discussed POC with team during rounds.  Per MD, no safe window for FT placement, initiate PN bridge, has central access.   - Labs reviewed:   Na, K WNL   No recent phosphorus of magnesium (ordered)    No baseline TG (ordered)   - Medications reviewed: on IVFs of D5/NaCl at 50 mL/hr.   - Ordered the following:    - Access: PICC  - Frequency: Continuous  - Custom regimen in 1200 mL (50 mL/hr) containing 200 g dextrose, 109 g AA + 250 mL 20% SMOFlipids 6x/week  - Provides 1545 kcal (17 kcal/kg), 1.2 g protein/kg, GIR: 1.5, 28% kcal from fat  - Total fluids = 50 mL/hr per MD  - DW: 91 kg  12/18: Initiate with 100 g dextrose, 109 g AA, lipids/total volume as outlined  12/19: Increase to initial goal dextrose of 200 g, continue 109 g AA and lipids/total volume as outlined    ASSESSED NUTRITION NEEDS PER APPROVED PRACTICE GUIDELINES:     Dosing Weight 91 kg   Estimated Energy Needs: 4536-1231 Kcal/Kg ([place] Jeor without activity factor up to activity factor of 1.2)  Justification: minimum maintenance, consideration of PN  Estimated Protein Needs: >/=109 (>/=1.2 g pro/Kg)  Justification: preservation of lean body mass, COVID+  Estimated Fluid Needs: per MD    - Electrolyte check for tomorrow AM ordered.    - Discussed orders with PharmD.  - Will continue following.       Kalyani Ashraf RDN, LD  Clinical Dietitian  3rd floor/ICU: [phone]  All other floors: [phone]  Weekend/holiday: [phone]

## 2021-12-19 NOTE — PROGRESS NOTES
An ABG was completed on the pt's right arm @ 0625  on an FIO2 of 100%  with no complications noted during the procedure.    RT Brock on 12/19/2021 at 6:29 AM

## 2021-12-19 NOTE — PROGRESS NOTES
Respiratory Therapy Note    Patient seen on a BiPAP of 16/8 @ 100% via the mask for an increase in WOB and/or SOB. The bridge of the nose is protected with gel pad in place. Pt is tolerating it well. Ventilator on standby in room. Will continue to monitor and assess the pt's current respiratory status and needs.    Renee Christiansen, RT  7:15 PM December 18, 2021

## 2021-12-19 NOTE — PLAN OF CARE
ICU End of Shift Summary.  For vital signs and complete assessments, please see documentation flowsheets.     Pertinent assessments:   Neuro: alert, oriented, but lethargic, PERRLA, follows commands  CV: SR/ST, pulses palpable all extremities, BP stable  Pulm: BiPap, maxed, no reserve 2x desaturation to the 40's when she took her bipap hose off the mask, lungs diminished, crackles laterally,   GI: bowel sounds audible no BM  : baxter - good output  Skin: see flowsheet     Major Shift Events: rapid O2 desaturation   Plan (Upcoming Events): tbd   Discharge/Transfer Needs: tbd    Bedside Shift Report Completed : y  Bedside Safety Check Completed: y

## 2021-12-19 NOTE — PROGRESS NOTES
[facility]  Hospitalist Progress Note  Patient Name: Sunita Barajas    MRN: [id number]  Provider: Mic Valdivia MD  Hospital Day 10           Assessment and Plan:      Summary of Stay: Sunita Barajas is a 76 year old female with history of dyslipidemia, hypertension, hypothyroidism and depression.  She has not been vaccinated for COVID-19.  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 She had been having spells of intermittent coughing spells and intermittent loose stooling at least a week prior to this presentation. She presented to the [facility] ED on 12/8/21 with worsening generalized weakness. She had had a fall event possibly 2 to 3 days prior to presentation.   She did not report to work so her employer contacted the family and they checked on her at home and found her on the floor.  She mentioned that she had not been eating any food for at least 2 days. ED work up showed temperature of 97.6, heart rate 98, blood pressure 143/77, respiratory 20 and oxygen saturation 95% on 10 L oxygen mask.  Labs show elevated creatinine of 1.5, , .  She tested positive for COVID-19.  CT showed pulmonary infiltrates consistent with COVID-19 pneumonia but no PE.  There was also a left ovarian cyst. She was admitted for management of COVID-19 pneumonia. She was treated with dexamethasone, remdesivir and enoxaparin. Baricitnib was added later.   Patient was having increasing oxygen needs. Repeat CT on 12/16 showed no PE but worsening covid changes and pneumomediastinum.   She was placed on BiPAP with 100% FiO2. She was transferred to ICU due to worsening hypoxia.     Problem list:     Acute hypoxic respiratory failure secondary to COVID-19 pneumonia    -Not vaccinated    -Date of onset of symptoms: unclear maybe about 11/23/21     -Date of positive test:  12/8/21     -Will continue dexamethasone, day #11    -Completed remdesivir on 12/12    -Continue baricitinib. Day  #10/14    -No PE on CT chest on admission (12/8)    -On DVT prophylaxis with lovenox    -repeat CT was done on 12/16 which showed no PE, but worsening ground glass opacities and pneumomediastinum    -On 12/17 she was maxed out on BiPAP and transferred to ICU for possible intubation.     -Currently remains on BiPAP with FiO2 100%, O2 sat  91-95%. Looks more exhausted today. May need intubation     -Full Code. Her son Diego was updated and agrees if patient requires intubation.     Addendum 5:07pm. Patient's O2 sat in 92-94% range. Discussed with patient again regarding intubation. She stated that she wants to wait. Will hold off intubation for now per her wishes but will likely need intubation. Will closely monitor vitals. Updated intensivist.    ESBL E. Coli UTI  -UC grew ESBL E.coli  -Completed 5 day course of meropenem on 12/15     Urinary retention    - required baxter placement (12/15)    - Continue baxter catheter    Acute on chronic renal failure    - per Care Everywhere her baseline Cr is about 1.1-1.2    - Creatinine 0.84 today     Acute rhabdomyolysis    - received IVF    - improved with IV fluids     Ovarian cyst     - seen on CT    - will need outpatient ultrasound follow-up      Hypertension    - not on meds at home    - BPs stable here    - previously was on lisinopril 20 mg daily- restarted here     Hypothyroidism    - not on meds    - previously was on synthroid 75mcg which was started here    - checked TSH/Free T4: normal     Depression    - not on meds     Hyperlipidemia    - not currently on meds    - previously on simvastatin 40 mg daily- restarted here     Severe protein malnutrition  - due to poor caloric intake due to underlying infection and need for BiPAP  -Consulted pharmacy/dietician and started on TPN    Code Status: Full Code. Confirmed with her and her son  DVT Prophylaxis: Enoxaparin (Lovenox) SQ  Disposition: Still on high O2 levels. Likely here for several more days    Spent> 35 minutes  "on chart review, coordination of care, updating the family, documentation        Interval History:      Patient remains on BiPAP. She stated that she is feeling more short of breath today. She denies nausea or vomiting. She also denies fever.          Physical Exam:      Last Vital Signs:  /75   Pulse 84   Temp 97.8  F (36.6  C) (Axillary)   Resp 22   Ht 1.549 m (5' 1\")   Wt 81 kg (178 lb 9.2 oz)   SpO2 91%   BMI 33.74 kg/m      Intake/Output Summary (Last 24 hours) at 12/13/2021 1502  Last data filed at 12/12/2021 1812  Gross per 24 hour   Intake 810 ml   Output --   Net 810 ml       GENERAL:  Comfortable at rest. Cooperative.  PSYCH: pleasant, oriented, No acute distress.  EYES: PERRLA, Normal conjunctiva.  HEART:  Regular rate and rhythm. No JVD. Pulses normal. No edema.  LUNGS:  Left >Right crackles to mid lung  ABDOMEN:  Soft, no hepatosplenomegaly, normal bowel sounds.  EXTREMETIES: No clubbing, cyanosis or ischemia  SKIN:  Dry to touch, No rash.           Medications:      All current medications were reviewed.         Data:      All new lab and imaging data was reviewed.   Labs:       Lab Results   Component Value Date     12/12/2021     12/11/2021     12/10/2021    Lab Results   Component Value Date    CHLORIDE 112 12/12/2021    CHLORIDE 109 12/11/2021    CHLORIDE 108 12/10/2021    Lab Results   Component Value Date    BUN 36 12/12/2021    BUN 40 12/11/2021    BUN 35 12/10/2021      Lab Results   Component Value Date    POTASSIUM 4.3 12/12/2021    POTASSIUM 4.0 12/11/2021    POTASSIUM 4.0 12/10/2021    Lab Results   Component Value Date    CO2 20 12/12/2021    CO2 20 12/11/2021    CO2 19 12/10/2021    Lab Results   Component Value Date    CR 1.12 12/13/2021    CR 1.24 12/12/2021    CR 1.33 12/11/2021        Recent Labs   Lab 12/18/21  0501 12/17/21  1246 12/16/21  0532   WBC 14.8* 15.0* 7.9   HGB 12.2 12.2 12.0   HCT 37.3 37.3 37.9   MCV 95 96 96    278 284    "

## 2021-12-19 NOTE — PROGRESS NOTES
"Patient remains on a BiPAP of  16/8 @ 95% via the mask for an increase in WOB and/or SOB.  The bridge of the nose is protected with gel pad in place.Pt is tolerating it well. Will continue to monitor and assess the pt's current respiratory status and needs.    /74   Pulse 65   Temp 97.8  F (36.6  C) (Axillary)   Resp 8   Ht 1.549 m (5' 1\")   Wt 81 kg (178 lb 9.2 oz)   SpO2 93%   BMI 33.74 kg/m    Maryam Negrete, RT on 12/19/2021 at 4:54 AM    "

## 2021-12-19 NOTE — PROGRESS NOTES
"An ABG was completed on the pt's right arm @ 0015 on an FIO2 of 95% (on BiPAP while proned)  with no complications noted during the procedure.    /66   Pulse 61   Temp 98.7  F (37.1  C)   Resp 21   Ht 1.549 m (5' 1\")   Wt 79 kg (174 lb 2.6 oz)   SpO2 92%   BMI 32.91 kg/m    Maryam Negrete RT on 12/19/2021 at 1:07 AM      "

## 2021-12-19 NOTE — PROGRESS NOTES
Respiratory Therapy Note    Patient seen on a BiPAP of 16/10 @ 100% via the mask for an increase in WOB and/or SOB. The bridge of the nose was noted to have mild redness, patient was changed to a total face mask (scuba mask) to relieve pressure from nose. Pt is tolerating it well. Ventilator on standby in room. Will continue to monitor and assess the pt's current respiratory status and needs.    Renee Christiansen, RT  December 19, 2021

## 2021-12-20 NOTE — PROGRESS NOTES
[facility] hospital ICU VENTILATOR RESPIRATORY NOTE     Date of Admission: 12/17/2021  Date of Intubation (most recent): 12/19/2021  Reason for Mechanical Ventilation: Resp Failure   Number of Days on Mechanical Ventilation: 2  Met Criteria for Pressure Support Trial: NO  Reason for No Pressure Support Trial: PEEP+12, 100% FiO2, Full strength veletri  Significant Events Today:      Last Arterial Blood Gas:  pH Arterial   Date Value Ref Range Status   12/20/2021 7.32 (L) 7.35 - 7.45 Final     pCO2 Arterial   Date Value Ref Range Status   12/20/2021 47 (H) 35 - 45 mm Hg Final     pO2 Arterial   Date Value Ref Range Status   12/20/2021 81 80 - 105 mm Hg Final     Bicarbonate Arterial   Date Value Ref Range Status   12/20/2021 24 21 - 28 mmol/L Final     Base Excess/Deficit (+/-)   Date Value Ref Range Status   12/20/2021 -2.1 -9.0 - 1.8 mmol/L Final     Ventilation Mode: CMV/AC  (Continuous Mandatory Ventilation/ Assist Control)  FiO2 (%): 80 %  Rate Set (breaths/minute): 20 breaths/min  Tidal Volume Set (mL): 380 mL  PEEP (cm H2O): 12 cmH2O  Oxygen Concentration (%): 70 %  Resp: 19

## 2021-12-20 NOTE — PLAN OF CARE
"ICU End of Shift Summary.  For vital signs and complete assessments, please see documentation flowsheets.     Pertinent assessments: Patient remains on BiPAP 100%; easily de-sats into 70's with any movement or mouthcare. Pt self proning, 02 sats slightly improved in this position. 02 sats into 80's when laying supine. Discussed with MD, pt declines intubation at this time, wants to keep BiPAP on \"a little longer\".  RT placed larger \"scauba\" BiPAP mask on patient for skin protection. Pt denies pain. Tele- SR/ST, up to 120's with movement in bed. TPN infusing at goal rate. Rojas patent.     Major Shift Events: Pt de-sats quickly. Declined intubation at this time. Pt maintained in prone position.     Plan (Upcoming Events): Closely monitor 02 sats    Discharge/Transfer Needs: TBD  Bedside Shift Report Completed :   Bedside Safety Check Completed:      "

## 2021-12-20 NOTE — PROGRESS NOTES
RT note    [facility] ICU VENTILATOR RESPIRATORY NOTE    Date of Admission: 12/17/2021  Date of Intubation (most recent): 12/19/2021  Reason for Mechanical Ventilation: Resp Failure   Number of Days on Mechanical Ventilation: 2  Met Criteria for Pressure Support Trial: NO  Reason for No Pressure Support Trial: PEEP+12, 100% FiO2, Full strength veletri  Significant Events Today:  Per MD orders, adjusted vent setting per ABGs.      Current vent settings    Ventilation Mode: CMV/AC  (Continuous Mandatory Ventilation/ Assist Control)  FiO2 (%): 95 %  Rate Set (breaths/minute): (S) 20 breaths/min (per ABGs per MD orders)  Tidal Volume Set (mL): 380 mL  PEEP (cm H2O): (S) 12 cmH2O (per ABGs per MD orders)  Oxygen Concentration (%): 95 %  Resp: 19    ABG Results:   Recent Labs   Lab 12/20/21  0040 12/19/21  0627 12/19/21  0015 12/16/21  0329   PH 7.30* 7.42 7.42 7.47*   PCO2 49* 40 39 37   PO2 122* 66* 72* 65*   HCO3 24 26 26 27   O2PER 100 100 95 100     ETT appearance on chest x-ray: ETT is 5 cm above the conor    Plan:  Will get ABGs in AM.  Will continue to monitor    RT Brock on 12/20/2021 at 3:53 AM'

## 2021-12-20 NOTE — CONSULTS
CLINICAL NUTRITION SERVICES - REASSESSMENT NOTE     Nutrition Prescription    RECOMMENDATIONS FOR MDs/PROVIDERS TO ORDER:  Continue current electrolyte checks as ordered    Malnutrition Status:    Severe malnutrition in the context of acute illness    Recommendations already ordered by Registered Dietitian (RD):  Enteral Nutrition - Initiate- Vital HP @ goal rate of 30 ml/hr (720 ml) provides 720 kcal, 63 g protein, 80 g CHO, 0 g fiber, 602 ml free H2O    + Prosource- 3 pkts/day- 120 kcal/33 g protein    Total provisions including propofol at current rate = 1162 kcal (14.7 kcal/kg per DW of 79 kg), 96 g protein (2 g/kg per IBW of 47.7 kg)    Free water flushes of 60 ml q 4 hrs    Order Certavite- 15 ml/day to meet micronutrient requirements    Pt initially at high risk of refeeding syndrome, but now at reduced risk d/t 2 days of TPN.    Future/Additional Recommendations:  Monitor tolerance of EN, labs and weight trends, GI function, propofol rate and ability to increase EN provisions     Reason for reassessment: pt intubated, consult for enteral nutrition    EVALUATION OF THE PROGRESS TOWARD GOALS   Diet: NPO  Nutrition Support:     Access: PICC    Composition: D 200 g,  g @ 50 ml/hr    Lipids: 250 ml 20% SMOF lipids 6x/week    Multivitamin/trace element: Infuvite- 10 ml/day, Tralement- 1 ml/day    Total provisions:1545 kcal (17 kcal/kg), 1.2 g protein/kg, GIR: 1.5, 28% kcal from fat     NEW FINDINGS   Pt discussed during IDT rounds this morning. Pt has OG in place and keofeed has been ordered for post-pyloric access. TPN will be discontinued today.     Weight: Per care everywhere review, pt's weights ranged between 195 and 200 lbs up until November 2021. Based on admission weight, pt has lost 12% body weight over less than 2 weeks  Vitals:    12/08/21 2336 12/17/21 1144 12/19/21 0400 12/20/21 0400   Weight: 90.7 kg (200 lb) 79 kg (174 lb 2.6 oz) 81 kg (178 lb 9.2 oz) 79.8 kg (175 lb 14.8 oz)     Labs:    Electrolytes  Potassium (mmol/L)   Date Value   12/20/2021 4.3   12/19/2021 4.5   12/18/2021 4.6     Phosphorus (mg/dL)   Date Value   12/20/2021 2.8   12/19/2021 2.5   12/18/2021 2.6   12/08/2021 4.0    Blood Glucose  Glucose (mg/dL)   Date Value   12/20/2021 228 (H)   12/18/2021 129 (H)   12/17/2021 102 (H)   12/16/2021 125 (H)   12/12/2021 131 (H)     GLUCOSE BY METER POCT (mg/dL)   Date Value   12/20/2021 170 (H)   12/20/2021 213 (H)   12/20/2021 219 (H)   12/19/2021 156 (H)   12/19/2021 87     Hemoglobin A1C (%)   Date Value   12/16/2021 5.6    Inflammatory Markers  CRP Inflammation (mg/L)   Date Value   12/19/2021 148.0 (H)   12/17/2021 81.0 (H)   12/16/2021 120.0 (H)     WBC Count (10e3/uL)   Date Value   12/20/2021 15.6 (H)   12/18/2021 14.8 (H)   12/17/2021 15.0 (H)     Albumin (g/dL)   Date Value   12/20/2021 1.5 (L)   12/12/2021 2.2 (L)   12/08/2021 3.0 (L)      Magnesium (mg/dL)   Date Value   12/20/2021 2.3   12/19/2021 2.2   12/18/2021 2.4 (H)     Sodium (mmol/L)   Date Value   12/20/2021 141   12/18/2021 138   12/17/2021 135    Renal  Urea Nitrogen (mg/dL)   Date Value   12/20/2021 36 (H)   12/18/2021 29   12/17/2021 32 (H)     Creatinine (mg/dL)   Date Value   12/20/2021 0.79   12/19/2021 0.84   12/18/2021 0.87     Additional  Triglycerides (mg/dL)   Date Value   12/18/2021 168 (H)     Ketones Urine (mg/dL)   Date Value   12/18/2021 Negative        Recent Labs   Lab 12/20/21  0810 12/20/21  0435 12/20/21  0428 12/20/21  0024 12/19/21  1942 12/19/21  1612   * 213* 228* 219* 156* 87       GI: constipation, LBM 12/15, hypoactive bowel sounds    Skin: red, blanchable sacrum/coccyx    Meds:     [Held by provider] aspirin  81 mg Oral Daily     baricitinib  2 mg Oral Daily     dexamethasone  12 mg Intravenous Daily     enoxaparin ANTICOAGULANT  40 mg Subcutaneous Q12H     [Held by provider] FLUoxetine  60 mg Oral Daily     insulin aspart  1-6 Units Subcutaneous Q4H     [Held by provider]  ipratropium-albuterol  2 puff Inhalation 4x Daily     lactated ringers  1,000 mL Intravenous Once     levothyroxine  75 mcg Oral Daily     lipids 4 oil  250 mL Intravenous Once per day on Sun Mon Tue Wed Thu Sat     [Held by provider] lisinopril  20 mg Oral Daily     pantoprazole  40 mg Per Feeding Tube QAM AC    Or     pantoprazole (PROTONIX) IV  40 mg Intravenous QAM AC     [Held by provider] simvastatin  40 mg Oral At Bedtime     sodium chloride (PF)  10-40 mL Intracatheter Q7 Days     sodium chloride (PF)  3 mL Intracatheter Q8H        dexmedetomidine Stopped (12/20/21 0023)     dextrose       epoprostenol (VELETRI) 20 mcg/mL in sterile water inhalation solution 20 ng/kg/min (12/20/21 0922)     fentaNYL 125 mcg/hr (12/20/21 0922)     propofol (DIPRIVAN) infusion 30 mcg/kg/min (12/20/21 0924)    And     - MEDICATION INSTRUCTIONS -       norepinephrine 0.02 mcg/kg/min (12/20/21 0923)     parenteral nutrition - ADULT compounded formula 50 mL/hr at 12/20/21 0923        *Propofol at current rate of 12.2 ml/hr provides 322 kcal/day    ASSESSED NUTRITION NEEDS- 79 kg  Estimated Energy Needs: 870-1105 kcals/day (11 - 14 kcals/kg), 1475 kcal/day (PSU 2003b)  Justification: Obese and Vented  Estimated Protein Needs: >95 grams protein/day (> 2 g/kg based on IBW of 47.7 kg)  Justification: Hypercatabolism with critical illness  Estimated Fluid Needs: Per provider pending fluid status    MALNUTRITION  % Intake: </= 50% for >/= 5 days (severe)  % Weight Loss: > 2% in 1 week (severe)  Subcutaneous Fat Loss: Unable to assess  Muscle Loss: Unable to assess  Fluid Accumulation/Edema: None noted  Malnutrition Diagnosis: Severe malnutrition in the context of acute illness    Previous Goals   Patient to consume at least 50-75% meals/supplements w/in the next 24-48 hrs vs need for consideration of nutrition support interventions per MD discretion.    Evaluation: Met    Previous Nutrition Diagnosis  Inadequate oral intake related to  respiratory needs, mentation, and suspect decreased appetite in the setting of COVID+ trajectory as evidenced by meeting <75% needs (or less) during 7 day admit, now requiring bipap.   Evaluation: goal revised, see below for updated diagnosis    CURRENT NUTRITION DIAGNOSIS  Inadequate oral intake related to intubation as evidenced by need for nutrition support to meet needs      INTERVENTIONS  Implementation  Enteral Nutrition - Initiate- Vital HP @ goal rate of 30 ml/hr (720 ml) provides 720 kcal, 63 g protein, 80 g CHO, 0 g fiber, 602 ml free H2O    + Prosource- 3 pkts/day- 120 kcal/33 g protein    Total provisions including propofol at current rate = 1162 kcal (14.7 kcal/kg per DW of 79 kg), 96 g protein (2 g/kg per IBW of 47.7 kg)    Free water flushes of 60 ml q 4 hrs    Order Certavite- 15 ml/day to meet micronutrient requirements    Pt initially at high risk of refeeding syndrome, but now at reduced risk d/t 2 days of TPN.    Goals  Tube feeding + propofol to meet % of estimated needs    Monitoring/Evaluation  Progress toward goals will be monitored and evaluated per protocol.    Laurence Morris RD, LD  Pager - 3rd floor/ICU: [phone]  Pager - All other floors: [phone]  Pager - Weekend/holiday: [phone]  Office: [phone]

## 2021-12-20 NOTE — PROGRESS NOTES
TELE Note:    Called multiple times about this patient who has had declining oxygenation over the course of the day as well as worsening anxiety. Unable to keep saturations much above 90% despite being on 100% bipap and prone. Patient is appropriately afraid of being intubated because of the implications for her predicted mortality but does not want to stop treatments. I spoke with her son on the phone who relayed that they completed an advanced care directive on Friday (12/17) in which she requested full medical therapies but not prolonged intubation or continued treatments if she weren't going to recover  - Proceed with intubation and lung protective ventilation  - RN will work with son to get hard copy of advanced directive to hospital.    Mallika Alexis MD

## 2021-12-20 NOTE — ANESTHESIA PROCEDURE NOTES
Airway       Patient location during procedure: ICU       Procedure Start/Stop Times: 12/19/2021 11:23 PM  Staff -        CRNA: Severson, Dean Dennis, APRN CRNA       Performed By: CRNA  Consent for Airway        Urgency: emergent       Consent: The procedure was performed in an emergent situation.  Report Obtained from Primary Care Team       History regarding most recent potassium obtained: Yes       History regarding presence/absence of renal failure obtained:Yes       History regarding stroke/CVA obtained:Yes       History regarding presence/absence of NM disorder: YesIndications and Patient Condition       Indications for airway management: shanti-procedural and airway protection       Mallampati: II     Induction type:RSI       Mask difficulty assessment: 0 - not attempted    Final Airway Details       Final airway type: endotracheal airway       Successful airway: ETT - single  Endotracheal Airway Details        ETT size (mm): 7.0       Cuffed: yes       Successful intubation technique: video laryngoscopy       VL Blade Size: Glidescope 3       Grade View of Cords: 1       Placement verification comments: (Colorimetric indicator positive for CO2)       Adjucts: stylet       Position: Center       Measured from: lips       Secured at (cm): 22       Bite block used: None    Post intubation assessment        Placement verified by: capnometry, equal breath sounds and chest rise        Number of attempts at approach: 1       Number of other approaches attempted: 0       Secured with: commercial tube walton       Ease of procedure: easy       Dentition: Intact and Unchanged

## 2021-12-20 NOTE — PLAN OF CARE
ICU End of Shift Summary.  For vital signs and complete assessments, please see documentation flowsheets.     Pertinent assessments:   Neuro: Intubated at around 2330 last night, RASS -5, titrating down sedation, PERRLA  CV: SR/SB, t- wave inversion and slight ST depression - trops normal, pulses palpable, BP hypotensive  Pulm: coarse LS, intubated/vented  GI: bowel sounds audible  : good baxter output  Skin: see flowsheet     Major Shift Events: intubation  Plan (Upcoming Events): tbd  Discharge/Transfer Needs: tbd    Bedside Shift Report Completed : y  Bedside Safety Check Completed: y    Right wrist and Left wrist restraints continued 12/20/2021    Clinical Justification: Pulling lines, pulling tubes, and pulling equipment  Less Restrictive Alternative: Disguise equipment  Attending Physician Notified: Yes, Attending Physician's Name: chuck   New orders placed Yes  Length of Order: 1 Day      Natalia Pugh RN

## 2021-12-20 NOTE — PROGRESS NOTES
An ABG was completed on the pt's right arm @ 0540 on an FIO2 of 80%  with no complications noted during the procedure.    RT Brock on 12/20/2021 at 6:48 AM

## 2021-12-20 NOTE — PROGRESS NOTES
RT note    Patient intubated by CRNA with #7.0 ETT secured 22cm at the teeth.  Placement confirmed with positive color change on EtCO2 dectector and bilateral breath sounds.  Placed patient on ventilator with initial settings of CMV/AC 18/380/+14/100%.  Initiated full strength veletri per MD orders.  CXR pending.  Will get ABGs in 30 minutes post intubation.  Will continue to monitor    RT Brock on 12/20/2021 at 3:48 AM

## 2021-12-20 NOTE — PROGRESS NOTES
[facility]  Hospitalist Progress Note  Patient Name: Sunita Barajas    MRN: [id number]  Provider: Mic Valdivia MD  Hospital Day 10           Assessment and Plan:      Summary of Stay: Sunita Barajas is a 76 year old female with history of dyslipidemia, hypertension, hypothyroidism and depression.  She has not been vaccinated for COVID-19.  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 She had been having spells of intermittent coughing spells and intermittent loose stooling at least a week prior to this presentation. She presented to the [facility] ED on 12/8/21 with worsening generalized weakness. She had had a fall event possibly 2 to 3 days prior to presentation. She did not report to work so her employer contacted the family and they checked on her at home and found her on the floor.  She mentioned that she had not been eating any food for at least 2 days. ED work up showed temperature of 97.6, heart rate 98, blood pressure 143/77, respiratory 20 and oxygen saturation 95% on 10 L oxygen mask.  Labs show elevated creatinine of 1.5, , .  She tested positive for COVID-19.  CT showed pulmonary infiltrates consistent with COVID-19 pneumonia but no PE.  There was also a left ovarian cyst. She was admitted for management of COVID-19 pneumonia. She was treated with dexamethasone, remdesivir and enoxaparin. Baricitnib was added later.   Patient was having increasing oxygen needs. Repeat CT on 12/16 showed no PE but worsening covid changes and pneumomediastinum.   She was placed on BiPAP with 100% FiO2. She was transferred to ICU due to worsening hypoxia.     Problem list:     Acute hypoxic respiratory failure secondary to COVID-19 pneumonia    -Not vaccinated    -Date of onset of symptoms: unclear maybe about 11/23/21     -Date of positive test: 12/8/21     -Will continue dexamethasone, day #12. Dexamethasone increased to 12 mg daily today    -Completed remdesivir on  12/12    -Continue baricitinib to complete 14 days course    -No PE on CT chest on admission (12/8)    -On DVT prophylaxis with lovenox    -repeat CT was done on 12/16 which showed no PE, but worsening ground glass opacities and pneumomediastinum    -She has been requiring BiPAP with 100% FiO2 for several days. She was intubated last night.    -Currently on CMV/AC mode. Vent setting management per intensivist.     Ventilation Mode: CMV/AC  (Continuous Mandatory Ventilation/ Assist Control)  FiO2 (%): 80 %  Rate Set (breaths/minute): 20 breaths/min  Tidal Volume Set (mL): 380 mL  PEEP (cm H2O): 12 cmH2O  Oxygen Concentration (%): 70 %  Resp: 19      ESBL E. Coli UTI  -UC grew ESBL E.coli  -Completed 5 day course of meropenem on 12/15     Urinary retention    - required baxter placement (12/15)    - Continue baxter catheter    Acute on chronic renal failure    - per Care Everywhere her baseline Cr is about 1.1-1.2    - Creatinine 0.84 today     Acute rhabdomyolysis    - received IVF    - improved with IV fluids     Ovarian cyst     - seen on CT    - will need outpatient ultrasound follow-up      Hypertension    - not on meds at home    - BPs stable here    - previously was on lisinopril 20 mg daily- restarted here     Hypothyroidism    - not on meds    - previously was on synthroid 75mcg which was started here    - checked TSH/Free T4: normal     Depression    - not on meds     Hyperlipidemia    - not currently on meds    - previously on simvastatin 40 mg daily- simvastatin on hold for now. Will restart once feeding tube placed     Severe protein malnutrition  - due to poor caloric intake due to underlying infection and need for BiPAP  -Consulted pharmacy/dietician and started on TPN  -Will start enteral feeding once feeding tube placed.     Code Status: Full Code. Confirmed with her and her son  DVT Prophylaxis: Enoxaparin (Lovenox) SQ  Disposition: Continue ICU care    Spent> 35 minutes on chart review, coordination  "of care, updating the family, documentation        Interval History:      Patient remains on BiPAP. She stated that she is feeling more short of breath today. She denies nausea or vomiting. She also denies fever.          Physical Exam:      Last Vital Signs:  BP (!) 147/65   Pulse 51   Temp 97  F (36.1  C) (Temporal)   Resp 19   Ht 1.549 m (5' 1\")   Wt 79.8 kg (175 lb 14.8 oz)   SpO2 98%   BMI 33.24 kg/m      Intake/Output Summary (Last 24 hours) at 12/13/2021 1502  Last data filed at 12/12/2021 1812  Gross per 24 hour   Intake 810 ml   Output --   Net 810 ml       GENERAL:  Comfortable at rest. Cooperative.  PSYCH: pleasant, oriented, No acute distress.  EYES: PERRLA, Normal conjunctiva.  HEART:  Regular rate and rhythm. No JVD. Pulses normal. No edema.  LUNGS:  Left >Right crackles to mid lung  ABDOMEN:  Soft, no hepatosplenomegaly, normal bowel sounds.  EXTREMETIES: No clubbing, cyanosis or ischemia  SKIN:  Dry to touch, No rash.           Medications:      All current medications were reviewed.         Data:      All new lab and imaging data was reviewed.   Labs:       Lab Results   Component Value Date     12/12/2021     12/11/2021     12/10/2021    Lab Results   Component Value Date    CHLORIDE 112 12/12/2021    CHLORIDE 109 12/11/2021    CHLORIDE 108 12/10/2021    Lab Results   Component Value Date    BUN 36 12/12/2021    BUN 40 12/11/2021    BUN 35 12/10/2021      Lab Results   Component Value Date    POTASSIUM 4.3 12/12/2021    POTASSIUM 4.0 12/11/2021    POTASSIUM 4.0 12/10/2021    Lab Results   Component Value Date    CO2 20 12/12/2021    CO2 20 12/11/2021    CO2 19 12/10/2021    Lab Results   Component Value Date    CR 1.12 12/13/2021    CR 1.24 12/12/2021    CR 1.33 12/11/2021        Recent Labs   Lab 12/20/21  0428 12/18/21  0501 12/17/21  1246   WBC 15.6* 14.8* 15.0*   HGB 10.8* 12.2 12.2   HCT 33.9* 37.3 37.3   MCV 99 95 96    295 278              "

## 2021-12-20 NOTE — ANESTHESIA CARE TRANSFER NOTE
Patient: Sunita Barajas    Procedure: * No procedures listed *       Diagnosis: * No pre-op diagnosis entered *  Diagnosis Additional Information: No value filed.    Anesthesia Type:   No value filed.     Note:    Oropharynx: endotracheal tube in place  Level of Consciousness: unresponsive (sedation right after intubation.)      Independent Airway: airway patency satisfactory and stable  Dentition: dentition unchanged  Vital Signs Stable: post-procedure vital signs reviewed and stable  Report to RN Given: handoff report given  Patient transferred to: ICU    ICU Handoff: Call for PAUSE to initiate/utilize ICU HANDOFF, Identified Patient, Identified Responsible Provider, Reviewed the Pertinent Medical History, Discussed Surgical Course, Reviewed Intra-OP Anesthesia Management and Issues during Anesthesia, Set Expectations for Post Procedure Period and Allowed Opportunity for Questions and Acknowledgement of Understanding      Vitals:  Vitals Value Taken Time   BP 84/45 12/19/21 2358   Temp     Pulse 78 12/20/21 0000   Resp 19 12/20/21 0000   SpO2 86 % 12/20/21 0000   Vitals shown include unvalidated device data.    Electronically Signed By: Dean Dennis Severson, APRN CRNA  December 20, 2021  12:01 AM

## 2021-12-20 NOTE — PROGRESS NOTES
[facility] Intensive Care Progress Note    Problem List  Acute hypoxic respiratory failure/ARDS related to  COVID-19 infection      Date of Service (when I saw the patient): 12/20/2021     Assessment & Plan   Sunita Barajas is a 76 year old female who was admitted on 12/8/2021. She was intubated late last night 12/19    Neurology:  sedated:   RASS goal -3/-4  Sedatives: fentanyl, propofol  Paralytic: no  -    Cardiovascular:  Norepinephrine low dose to keep map above 65:   -    Pulmonary:        Acute hypoxic respiratory failure due to Covid: Intubated late on 12/19 after 5 days on BiPAP and several more days on high flow nasal cannula  CXR: ETT is 5 cm above the conor on 12/20 bilateral infiltrates  Prone positioning: Daily 16 to 18 hours  Inhaled epoprostenol  -    Ventilation Mode: CMV/AC  (Continuous Mandatory Ventilation/ Assist Control)  FiO2 (%): 80 %  Rate Set (breaths/minute): 20 breaths/min  Tidal Volume Set (mL): 380 mL  PEEP (cm H2O): 12 cmH2O  Oxygen Concentration (%): 70 %  Resp: 20      Renal  Adequate renal function: Continue monitoring urine output and electrolytes  -    ID:         COVID-19 pneumonia: Symptoms started on 12/1, tested positive on 12/8  positive; last test on 12/8.  Hospitalization:  steroids, baricitinib + remdesivir  Ventilatory support: 1 days intubated  -    GI  OG tube in stomach: will place post-pyloric when able  -    Nutrition  Initiate enteral nutrition: okay to feed gastric when supine  - continue TPN    Endocrine:  Hyperglycemia: Subcutaneous insulin correctional  -    Heme/Onc:  Mild leukocytosis and anemia: Related to acute illness  -    DVT Prophylaxis: Enoxaparin (Lovenox) SQ  GI Prophylaxis: PPI    Restraints: Restraints for medical healing needed: YES    Family update by me today: No    Tamia Jordan MD    Time Spent on this Encounter   Billing:  I spent 30 minutes, exclusive of procedures and teaching, bedside and on the  inpatient unit today managing the critical care of Sunita Barajas in relation to the issues listed in this note.    Main Plans for Today   Increase dexamethasone  Continue supportive care with mechanical ventilation, prone positioning  Likely will need art line, plan to do it tomorrow when supine    Interval History   Patient was intubated last night after several days of high amounts of noninvasive support.  She developed symptoms around 12/1 including cough and diarrhea.  Presented to [facility] emergency department on 12/8.    Physical Exam   Temp: 97.3  F (36.3  C) Temp src: Temporal Temp  Min: 97  F (36.1  C)  Max: 99.2  F (37.3  C) BP: 122/57 Pulse: 52   Resp: 20 SpO2: 95 % O2 Device: Mechanical Ventilator    Vitals:    12/17/21 1144 12/19/21 0400 12/20/21 0400   Weight: 79 kg (174 lb 2.6 oz) 81 kg (178 lb 9.2 oz) 79.8 kg (175 lb 14.8 oz)     I/O last 3 completed shifts:  In: 1704.2 [I.V.:330.86]  Out: 1476 [Urine:1476]    Current Vitals:Temp:  [97  F (36.1  C)-99.2  F (37.3  C)] 97.3  F (36.3  C)  Pulse:  [] 52  Resp:  [7-54] 20  BP: ()/() 122/57  FiO2 (%):  [80 %-100 %] 80 %  SpO2:  [72 %-98 %] 95 %   Awake, alert and following commands  PERRL and conjugate gaze  Orally intubated  Lungs clear  H-RR w/o murmur  Abdomen-soft, non tender, non distended  Extremities-warm and no edema  Lines: No erythema or discharge at entry site for PICC Line  Current lines are required for patient management    Medications     dexmedetomidine Stopped (12/20/21 0023)     dextrose       epoprostenol (VELETRI) 20 mcg/mL in sterile water inhalation solution 20 ng/kg/min (12/20/21 0922)     fentaNYL 125 mcg/hr (12/20/21 0922)     propofol (DIPRIVAN) infusion 30 mcg/kg/min (12/20/21 0924)    And     - MEDICATION INSTRUCTIONS -       norepinephrine 0.02 mcg/kg/min (12/20/21 0923)     parenteral nutrition - ADULT compounded formula 50 mL/hr at 12/20/21 0923       [Held by provider] aspirin  81 mg Oral Daily      baricitinib  2 mg Oral Daily     dexamethasone  12 mg Intravenous Daily     enoxaparin ANTICOAGULANT  40 mg Subcutaneous Q12H     [Held by provider] FLUoxetine  60 mg Oral Daily     insulin aspart  1-6 Units Subcutaneous Q4H     [Held by provider] ipratropium-albuterol  2 puff Inhalation 4x Daily     lactated ringers  1,000 mL Intravenous Once     levothyroxine  75 mcg Oral Daily     lipids 4 oil  250 mL Intravenous Once per day on Sun Mon Tue Wed Thu Sat     [Held by provider] lisinopril  20 mg Oral Daily     pantoprazole  40 mg Per Feeding Tube QAM AC    Or     pantoprazole (PROTONIX) IV  40 mg Intravenous QAM AC     [Held by provider] simvastatin  40 mg Oral At Bedtime     sodium chloride (PF)  10-40 mL Intracatheter Q7 Days     sodium chloride (PF)  3 mL Intracatheter Q8H       Data   Recent Labs   Lab 12/20/21  0810 12/20/21  0435 12/20/21  0428 12/19/21  0518 12/19/21  0343 12/18/21  0800 12/18/21  0501 12/17/21  1312 12/17/21  1246 12/17/21  1245 12/17/21  0538   WBC  --   --  15.6*  --   --   --  14.8*  --  15.0*  --   --    HGB  --   --  10.8*  --   --   --  12.2  --  12.2  --   --    MCV  --   --  99  --   --   --  95  --  96  --   --    PLT  --   --  213  --   --   --  295  --  278  --   --    INR  --   --  1.22*  --   --   --   --   --   --   --   --    NA  --   --  141  --   --   --  138  --   --   --  135   POTASSIUM  --   --  4.3  --  4.5  --  4.6  --   --   --  4.9   CHLORIDE  --   --  111*  --   --   --  107  --   --   --  107   CO2  --   --  24  --   --   --  27  --   --   --  21   BUN  --   --  36*  --   --   --  29  --   --   --  32*   CR  --   --  0.79  --  0.84  --  0.87  --   --   --  0.86   ANIONGAP  --   --  6  --   --   --  4  --   --   --  7   SEDA  --   --  8.0*  --   --   --  8.0*  --   --   --  8.5   * 213* 228*   < >  --    < > 129*   < >  --    < > 102*   ALBUMIN  --   --  1.5*  --   --   --   --   --   --   --   --    PROTTOTAL  --   --  5.8*  --   --   --   --   --   --   --    --    BILITOTAL  --   --  0.2  --   --   --   --   --   --   --   --    ALKPHOS  --   --  53  --   --   --   --   --   --   --   --    ALT  --   --  15  --   --   --   --   --   --   --   --    AST  --   --  31  --   --   --   --   --   --   --   --     < > = values in this interval not displayed.     Recent Results (from the past 24 hour(s))   XR Chest Port 1 View    Narrative    EXAM: XR CHEST PORT 1 VIEW  LOCATION: [facility]  DATE/TIME: 12/19/2021 11:53 PM    INDICATION: Endotracheal tube positioning  COMPARISON: 12/17/2021.    FINDINGS: ET tube 5 cm above the conor. Right PICC in place. No pneumothorax. There is pneumomediastinum and gas into the right neck. Bibasilar infiltrates as well as infiltrate in left midlung. Bilateral breast prostheses.      Impression    IMPRESSION: ET tube 5 cm above the conor.   XR Abdomen Port 1 View    Narrative    EXAM: XR ABDOMEN PORT 1 VIEWS  LOCATION: [facility]  DATE/TIME: 12/20/2021 4:00 AM    INDICATION: feeding tube  COMPARISON: None.      Impression    IMPRESSION: NG tube in the body of the stomach.

## 2021-12-21 NOTE — PROGRESS NOTES
[facility], [facility]1983 ICU VENTILATOR RESPIRATORY NOTE     Date of Admission: 12/17/2021  Date of Intubation (most recent): 12/19/2021  Reason for Mechanical Ventilation: Resp Failure   Number of Days on Mechanical Ventilation: 3  Met Criteria for Pressure Support Trial: NO  Reason for No Pressure Support Trial: Patient is still on PEEP+12 cm H2O, FiO2 80% , and Full strength VELETRI.    Last Arterial Blood Gas:  pH Arterial   Date Value Ref Range Status   12/21/2021 7.32 (L) 7.35 - 7.45 Final     pCO2 Arterial   Date Value Ref Range Status   12/21/2021 48 (H) 35 - 45 mm Hg Final     pO2 Arterial   Date Value Ref Range Status   12/21/2021 77 (L) 80 - 105 mm Hg Final     Bicarbonate Arterial   Date Value Ref Range Status   12/21/2021 25 21 - 28 mmol/L Final     Base Excess/Deficit (+/-)   Date Value Ref Range Status   12/21/2021 -1.9 -9.0 - 1.8 mmol/L Final     Ventilation Mode: CMV/AC  (Continuous Mandatory Ventilation/ Assist Control)  FiO2 (%): 75 %  Rate Set (breaths/minute): 20 breaths/min  Tidal Volume Set (mL): 380 mL  PEEP (cm H2O): 12 cmH2O  Oxygen Concentration (%): 80 %  Resp: 19

## 2021-12-21 NOTE — CONSULTS
[facility]  Palliative Care Consultation   Text Page    Assessment & Plan   Sunita Barajas is a 76 year old female who was admitted on 12/8/2021.   Consulted by Tamia Jordan MD  to assist with symptom management, goals of care, and development of plan of care    Recommendations:  1. Goals of Care- Full Code  Hospitalization goals discussed  Phone call to son Sebastian Barajas left message to contact the office. Sebastian called back. We discussed code status. He will discuss with his brother if code status should change to no CPR/ pre arrest-intubation ok   Sebastian agrees with tube feedings and current medical management   Decisional Capacity- Unreliable. Patient does not have an advance directive in . Per  informed consent policy next of kin should be involved if patient becomes unable.   Next of kin Sebastian Barajas. Health Care directive reviewed. Emailed to honoring choices for urgent review  POLST  Not in EMR, reasonable to complete prior to discharge    2. Pain history of chronic back pain. Chemical sedation for medical healing and pain    Patient's opioid use in past 24 hours:  Fentanyl 125-100 mcg/hr, lower dose since 0018 am =--- mg Daily Morphine Equivalent  [place] Board of Pharmacy Data Base Reviewed:    YES; As expected, no concern for misuse/abuse of controlled medications based on this report. No controlled substance is past 12 months   ORT  NA  ( add dot phrase .FRHORT if warranted)    3. Dyspnea acute respiratory failure secondary to C0V2 PNA, diagnosed 12/17/21 day 2 on mechanical ventilation    -mechanical ventilator  -diprivan/fentanyl for medical healing   -respiratory hygiene   -medical management     4. Spiritual Care  Oriented to Spiritual Health as part of Palliative Care team. appreciate input from Christina Mei, staff chaplain Yates  Spiritual Background:  undesiginated     5. Care Planning  to be determined   Medications for discharge     Medical Decision Making and Goals of  Care:  Discussed on December 21, 2021 with Kalyani BERMUDEZ CNP:  I talked with the patient's son Diego Barajas by phone from 15:00- 15:15.  I introduced my self and the purpose of my involvement on the medical team.  He tells me he dropped off the patient health care directive. He read from the directive stating she wants all measures done, unless she is in a persistent vegetative state, if she would have poor quality of life or if treatment options have been exhausted.  He also stated that he was her primary health car agent .  He is pleased and in agreement with present level of care.    I asked what the patient would want if her heart would stop while in the hospital here.  I did not feel this would happen imminently, however it is possible with the amount of stress she is under.  He did not think she would want CPR under this circumstance. However, it is a grey area. Asked if he had family to discuss this with.Yes, He has a brother. Encouraged him to discuss with his brother and reach consensus.    Diego will call back in am after talking to his brother. I informed him I would review the HCD, and facilitate review by honoring choices so the document can be entered in the the EMR    Thank you for involving us in the patient's care.     Kalyani BERMUDEZ CNP  Pain Management and Palliative Care  [facility]  Pgr: [phone]    Time Spent on this Encounter   Total unit/floor time 110 minutes, time consisted of the following, examination of the patient, reviewing the record and completing documentation. >50% of time spent in counseling and coordination of care, Bedside Nurse KENYON Valdez RNN  and Hospitalist Mic Valdivia MD. And Tamia Jordan MD  Time spend counseling with family consisted of the following topics, goals of care, education about prognosis and symptom management.    Understanding of disease process:   This has  been discussed with family .    History of Present Illness   History is obtained from the patient and electronic health record    Sunita Barajas is a 76 year old female with a past medical history of depression with anxiety ( followed by Dr. Mills) back pain pleurisy, HTN, JORDEN, eating disorder, pericarditis,acute pancreatitis, tubular adenoma of colon,uterine mass and fall two weeks prior.  She was in her usual state of health and presented to the ED on 12/7/21 following a fall with associated dizziness.  She was too weak to get up on her own. She did hit her head without LOC.  She arrived from her senior apartment via EMS. The patient is not vaccinated against COVID-19 or influenza. She does have  a part time job and drives independently.   Testing shows CoV2 + status and COVID associated PNA. The patient has had further respiratory failure and now on mechanical ventilation for the past 2 days     This is in the setting of .  she {Documentation of recent stability vs decline:[id number]    Past Medical History   I have reviewed this patient's medical history and updated it with pertinent information if needed.   Cataracts  Parathyroid abnormality  Normocytic anemia  Depression  Pancreatitis  Tubular adenoma of colon  Uterine mass  JORDEN  Vitamin B12 deficiency   Vitamin D deficiency   Eating disorder  Dysthymia  Hypertension  Hyperlipidemia  Hypothyroidism  Female stress incontinence  Pericarditis  Shingles    Past Surgical History   I have reviewed this patient's surgical history and updated it with pertinent information if needed.  Breast augmentation  Tubal ligation  Colonoscopy x2     Prior to Admission Medications   Prior to Admission Medications   Prescriptions Last Dose Informant Patient Reported? Taking?   FLUoxetine (PROZAC) 20 MG capsule   Yes Yes   Sig: Take 20 mg by mouth daily Take with 40mg = 60mg total dose   FLUoxetine (PROZAC) 40 MG capsule   Yes Yes   Sig: Take 40 mg by mouth daily Take with 20mg =  total dose of 60mg   aspirin 81 MG EC tablet   Yes Yes   Sig: Take 81 mg by mouth daily   cyanocobalamin (CYANOCOBALAMIN) 1000 MCG/ML injection   Yes Yes   Sig: Inject 1 mL into the muscle every 30 days   ergocalciferol (ERGOCALCIFEROL) 1.25 MG (58710 UT) capsule   Yes Yes   Sig: Take 50,000 Units by mouth   levothyroxine (SYNTHROID/LEVOTHROID) 75 MCG tablet   Yes Yes   Sig: Take 75 mcg by mouth daily   lisinopril (ZESTRIL) 20 MG tablet   Yes Yes   Sig: Take 20 mg by mouth daily   simvastatin (ZOCOR) 40 MG tablet   Yes Yes   Sig: Take 40 mg by mouth At Bedtime      Facility-Administered Medications: None     Allergies   Allergies   Allergen Reactions     Sulfa Drugs        Social History   I have updated and reviewed the following Social History Narrative:   Social History     Social History Narrative     Not on file          Living situation:  Alone in  high [employer]        Support system:  Sons        Actual/Potential Caregiver:  Sons        Functional status:  Unable to assess        Financial concerns:  Not indentified        Substance use disorder (past / present):  None        Occupation:  Part time     Family History   Arthritis, thyroid disease, depression- Mother   COPD- Father   HTN, Thyroid Disease, DM -sister  Alcohol abuse, HTN, CVA - brother     Review of Systems   The 10 point Review of Systems is negative other than noted in the HPI or here.    Palliative Symptom Review (0=no symptom/no concern, 1=mild, 2=moderate, 3=severe):      Unable due to chemical sedation     Physical Exam   Temp:  [96.8  F (36  C)-98  F (36.7  C)] 98  F (36.7  C)  Pulse:  [49-95] 95  Resp:  [15-29] 16  BP: ()/(42-85) 114/53  FiO2 (%):  [63 %-90 %] 75 %  SpO2:  [91 %-100 %] 95 %  181 lbs 7.02 oz  Exam:  GEN: sedated , appears comfortable, NAD.  HEENT:  Normocephalic/atraumatic, no scleral icterus, no nasal discharge, mouth moist.  CV:  RRR, S1, S2; no murmurs or other irregularities noted.  +3 DP/PT pulses bilatererally;  no edema BLE.  RESP:  Bilaterally course with rales/rhonchi no wheezing no retractions.  Symmetric chest rise on inhalation noted. Reduced  respiratory effort.  ABD:  Rounded, soft, non-tender/non-distended.  +BS  EXT:  Edema & pulses as noted above.  CMS intact x 4.     M/S:   Tender to palpation  Throughout .    SKIN:  Dry to touch, no exanthems noted in the visualized areas.    NEURO: minimal response to pain stim in feet   PAIN BEHAVIOR: Cooperative  Psych: sedated    Delirium Screen/CAM:  Unable to do sedated on ventilator of medical healling Delirium = (#1 and #2 = YES) + (#3 and/or #4)   1) Acute onset and fluctuating course:   No   (acute change in mental status from baseline over last 24 hours)  2) Inattention:   YES   (difficulty focusing, distractible, can't follow conversation)  3) Disorganized thinking:   Not applicable   (score only if #1 and #2 are YES)  (rambling/irrelevant conversation, unclear/illogical thoughts, inconsistency)  4) Altered level of consciousness:   YES   (score only if #1 and #2 are YES)  (other than alert, calm, cooperative)    Delirium/CAM score: 3/4  Interpretation:  1)  Delirium:  Absent  2)  Type:  Sedated   3)  Severity:  Sedated     Data   Results for orders placed or performed during the hospital encounter of 12/08/21 (from the past 24 hour(s))   Glucose by meter   Result Value Ref Range    GLUCOSE BY METER POCT 171 (H) 70 - 99 mg/dL   XR Abdomen Port 1 View    Narrative    ABDOMEN ONE VIEW PORTABLE  12/20/2021 4:11 PM     HISTORY: Status post feeding tube placement. Verify post pyloric  placement.    COMPARISON: None.      Impression    IMPRESSION: The tip of the feeding tube is projected over the  approximate location of the second portion of the duodenum, however,  the catheter may have turned back at the pylorus with the tip still in  the stomach. Recommend repeat x-ray immediately after injection of 10  mL Omnipaque to confirm postpyloric position.     BALA REYNA,  MD         SYSTEM ID:  [id number]   Glucose by meter   Result Value Ref Range    GLUCOSE BY METER POCT 148 (H) 70 - 99 mg/dL   Glucose by meter   Result Value Ref Range    GLUCOSE BY METER POCT 118 (H) 70 - 99 mg/dL   CBC with Platelets & Differential    Narrative    The following orders were created for panel order CBC with Platelets & Differential.  Procedure                               Abnormality         Status                     ---------                               -----------         ------                     CBC with platelets and d...[[id number]]  Abnormal            Final result               Manual Differential[[id number]]          Abnormal            Final result                 Please view results for these tests on the individual orders.   Basic metabolic panel   Result Value Ref Range    Sodium 142 133 - 144 mmol/L    Potassium 4.8 3.4 - 5.3 mmol/L    Chloride 112 (H) 94 - 109 mmol/L    Carbon Dioxide (CO2) 25 20 - 32 mmol/L    Anion Gap 5 3 - 14 mmol/L    Urea Nitrogen 46 (H) 7 - 30 mg/dL    Creatinine 0.92 0.52 - 1.04 mg/dL    Calcium 8.4 (L) 8.5 - 10.1 mg/dL    Glucose 137 (H) 70 - 99 mg/dL    GFR Estimate 61 >60 mL/min/1.73m2   Magnesium   Result Value Ref Range    Magnesium 2.6 (H) 1.6 - 2.3 mg/dL   Phosphorus   Result Value Ref Range    Phosphorus 3.9 2.5 - 4.5 mg/dL   CBC with platelets and differential   Result Value Ref Range    WBC Count 13.4 (H) 4.0 - 11.0 10e3/uL    RBC Count 3.60 (L) 3.80 - 5.20 10e6/uL    Hemoglobin 11.2 (L) 11.7 - 15.7 g/dL    Hematocrit 36.1 35.0 - 47.0 %     78 - 100 fL    MCH 31.1 26.5 - 33.0 pg    MCHC 31.0 (L) 31.5 - 36.5 g/dL    RDW 13.3 10.0 - 15.0 %    Platelet Count 223 150 - 450 10e3/uL   Manual Differential   Result Value Ref Range    % Neutrophils 96 %    % Lymphocytes 3 %    % Monocytes 1 %    % Eosinophils 0 %    % Basophils 0 %    Absolute Neutrophils 12.9 (H) 1.6 - 8.3 10e3/uL    Absolute Lymphocytes 0.4 (L) 0.8 - 5.3 10e3/uL    Absolute  Monocytes 0.1 0.0 - 1.3 10e3/uL    Absolute Eosinophils 0.0 0.0 - 0.7 10e3/uL    Absolute Basophils 0.0 0.0 - 0.2 10e3/uL    RBC Morphology Confirmed RBC Indices     Platelet Assessment  Automated Count Confirmed. Platelet morphology is normal.     Automated Count Confirmed. Platelet morphology is normal.   Glucose by meter   Result Value Ref Range    GLUCOSE BY METER POCT 131 (H) 70 - 99 mg/dL   Blood gas arterial and oxyhgb   Result Value Ref Range    pH Arterial 7.32 (L) 7.35 - 7.45    pCO2 Arterial 48 (H) 35 - 45 mm Hg    pO2 Arterial 77 (L) 80 - 105 mm Hg    Bicarbonate Arterial 25 21 - 28 mmol/L    Oxyhemoglobin Arterial 94 92 - 100 %    Base Excess/Deficit (+/-) -1.9 -9.0 - 1.8 mmol/L    FIO2 75    Glucose by meter   Result Value Ref Range    GLUCOSE BY METER POCT 123 (H) 70 - 99 mg/dL   Glucose by meter   Result Value Ref Range    GLUCOSE BY METER POCT 135 (H) 70 - 99 mg/dL

## 2021-12-21 NOTE — PROGRESS NOTES
[facility]  Hospitalist Progress Note  Patient Name: Sunita Barajas    MRN: [id number]  Provider: Mic Valdivia MD         Assessment and Plan:      Summary of Stay: Sunita Barajas is a 76 year old female with history of dyslipidemia, hypertension, hypothyroidism and depression. She has not been vaccinated for COVID-19.  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 She has been having intermittently cough for about a week prior to presentation. She [facility] ED on 12/8/21 with worsening generalized weakness and cough. She had had a fall event possibly 2 to 3 days prior to presentation. She did not report to work so her employer contacted the family and they checked on her at home and found her on the floor.  She mentioned that she had not been eating any food for at least 2 days. ED work up showed temperature of 97.6, heart rate 98, blood pressure 143/77, respiratory 20 and oxygen saturation 95% on 10 L oxygen mask.  Labs show elevated creatinine of 1.5, , .  She tested positive for COVID-19. CT showed pulmonary infiltrates consistent with COVID-19 pneumonia but no PE. There was also a left ovarian cyst. She was admitted for management of COVID-19 pneumonia. She was treated with dexamethasone, remdesivir and enoxaparin. Baricitnib was added later.   Patient was having increasing oxygen needs. Repeat CT on 12/16 showed no PE but worsening covid changes and pneumomediastinum.   She was placed on BiPAP with 100% FiO2. She was transferred to ICU due to worsening hypoxia.     Problem list:     Acute hypoxic respiratory failure secondary to COVID-19 pneumonia    -Not vaccinated    -Date of onset of symptoms: unclear maybe about 11/23/21     -Date of positive test: 12/8/21     -She has been requiring BiPAP with 100% FiO2 for several days. Intubated on 12/19 due to worsening hypoxia.     -On dexamethasone, day #13. Dexamethasone increased to 12 mg daily on  12/20    -Completed remdesivir on 12/12    -Started on baricitinib on 12/13. Continue baricitinib to complete 14 days course    -On DVT prophylaxis with Lovenox 40 mg subcutaneous every 12 hours    -No PE on CT chest on admission (12/8). Repeat CT was done on 12/16 which showed no PE, but worsening ground glass opacities and pneumomediastinum    -Currently on CMV/AC mode. Vent setting management per intensivist.     Ventilation Mode: CMV/AC  (Continuous Mandatory Ventilation/ Assist Control)  FiO2 (%): 75 %  Rate Set (breaths/minute): 20 breaths/min  Tidal Volume Set (mL): 380 mL  PEEP (cm H2O): 12 cmH2O  Oxygen Concentration (%): (S) 75 %  Resp: 16      ESBL E. Coli UTI  -UC grew ESBL E.coli  -Completed 5 day course of meropenem on 12/15  -Currently afebrile     Urinary retention   - required baxter placement (12/15)   - Continue baxter catheter    Acute on chronic kidney injury    -Per Care Everywhere her baseline Cr is about 1.1-1.2    -Creatinine elevated at 1.54 on admission. Renal function improved    -Creatinine 0.84 today     Acute rhabdomyolysis    - received IVF    - improved with IV fluids     Ovarian cyst     -seen on CT    -will need outpatient ultrasound follow-up      Hypertension    - not on meds at home    - BPs stable here    - previously was on lisinopril 20 mg daily- restarted here     Hypothyroidism    - not on meds    - previously was on synthroid 75mcg which was started here    - checked TSH/Free T4: normal     Depression    - not on meds     Hyperlipidemia    - not currently on meds    - previously on simvastatin 40 mg daily- simvastatin on hold for now. Will restart once feeding tube placed     Severe protein malnutrition  - due to poor caloric intake due to underlying infection and need for BiPAP  -Consulted pharmacy/dietician and started on TPN  -Will start enteral feeding once feeding tube placed.     Code Status: Full Code. Confirmed with her and her son  DVT Prophylaxis: Enoxaparin  "(Lovenox) SQ  Disposition: Continue ICU care    Spent> 35 minutes on chart review, coordination of care, updating the family, documentation        Interval History:      Patient remains on BiPAP. She stated that she is feeling more short of breath today. She denies nausea or vomiting. She also denies fever.          Physical Exam:      Last Vital Signs:  BP (!) 84/42   Pulse 90   Temp 96.9  F (36.1  C) (Axillary)   Resp 16   Ht 1.549 m (5' 1\")   Wt 82.3 kg (181 lb 7 oz)   SpO2 91%   BMI 34.28 kg/m      Intake/Output Summary (Last 24 hours) at 12/13/2021 1502  Last data filed at 12/12/2021 1812  Gross per 24 hour   Intake 810 ml   Output --   Net 810 ml       GENERAL:  Comfortable at rest. Cooperative.  PSYCH: pleasant, oriented, No acute distress.  EYES: PERRLA, Normal conjunctiva.  HEART:  Regular rate and rhythm. No JVD. Pulses normal. No edema.  LUNGS:  Left >Right crackles to mid lung  ABDOMEN:  Soft, no hepatosplenomegaly, normal bowel sounds.  EXTREMETIES: No clubbing, cyanosis or ischemia  SKIN:  Dry to touch, No rash.           Medications:      All current medications were reviewed.         Data:      All new lab and imaging data was reviewed.   Labs:       Lab Results   Component Value Date     12/12/2021     12/11/2021     12/10/2021    Lab Results   Component Value Date    CHLORIDE 112 12/12/2021    CHLORIDE 109 12/11/2021    CHLORIDE 108 12/10/2021    Lab Results   Component Value Date    BUN 36 12/12/2021    BUN 40 12/11/2021    BUN 35 12/10/2021      Lab Results   Component Value Date    POTASSIUM 4.3 12/12/2021    POTASSIUM 4.0 12/11/2021    POTASSIUM 4.0 12/10/2021    Lab Results   Component Value Date    CO2 20 12/12/2021    CO2 20 12/11/2021    CO2 19 12/10/2021    Lab Results   Component Value Date    CR 1.12 12/13/2021    CR 1.24 12/12/2021    CR 1.33 12/11/2021        Recent Labs   Lab 12/21/21  0424 12/20/21  0428 12/18/21  0501   WBC 13.4* 15.6* 14.8*   HGB 11.2* " 10.8* 12.2   HCT 36.1 33.9* 37.3    99 95    213 937

## 2021-12-21 NOTE — PLAN OF CARE
ICU End of Shift Summary.  For vital signs and complete assessments, please see documentation flowsheets.     Pertinent assessments:  Pt is sedated, does move extremities at times with turns. LS course. SR tele. RASS -4. On the Vent 75%. PEEP 12,RR 20, . TF at goal, 30cc an hour.  Q4. No BM this shift. Rojas minimal output. FWF increased this afternoon.   Major Shift Events: Pt was supined at 1200. Pt tolerating well.   Plan (Upcoming Events): Cont. ICU cares   Discharge/Transfer Needs: TBD    Bedside Shift Report Completed : yes  Bedside Safety Check Completed:yes

## 2021-12-21 NOTE — PROGRESS NOTES
[facility] ICU VENTILATOR RESPIRATORY NOTE     Date of Admission: 12/17/2021  Date of Intubation (most recent): 12/19/2021  Reason for Mechanical Ventilation: Resp Failure   Number of Days on Mechanical Ventilation: 3  Met Criteria for Pressure Support Trial: NO  Reason for No Pressure Support Trial: Patient is still on PEEP+12 cm H2O, FiO2 90% , and Full strength VELETRI.    Ventilation Mode: CMV/AC  (Continuous Mandatory Ventilation/ Assist Control)  FiO2 (%): 80 %  Rate Set (breaths/minute): 20 breaths/min  Tidal Volume Set (mL): 380 mL  PEEP (cm H2O): 12 cmH2O  Oxygen Concentration (%): (S) 90 %  Resp: 18    Recent Labs   Lab 12/20/21  0559 12/20/21  0040 12/19/21  0627 12/19/21  0015   PH 7.32* 7.30* 7.42 7.42   PCO2 47* 49* 40 39   PO2 81 122* 66* 72*   HCO3 24 24 26 26   O2PER 80 100 100 95     Plan: Continue to monitor Pt's respiratory status.    Chiquis Peterson, RT

## 2021-12-21 NOTE — PROGRESS NOTES
[facility] Intensive Care Progress Note    Problem List  Acute hypoxic respiratory failure/ARDS related to  COVID-19 infection      Date of Service (when I saw the patient): 12/21/2021     Assessment & Plan   Sunita Barajas is a 76 year old female who was admitted on 12/8/2021. She was intubated late at night 12/19    Neurology:  sedated:   RASS goal -3/-4  Sedatives: fentanyl, propofol  Paralytic: no  -    Cardiovascular:  Norepinephrine low dose to keep map above 65:   -    Pulmonary:        Acute hypoxic respiratory failure due to Covid: Intubated late on 12/19 after 5 days on BiPAP and several more days on high flow nasal cannula  CXR: ETT is 5 cm above the conor on 12/20 bilateral infiltrates  Prone positioning: did not help  Inhaled epoprostenol  - PF is 100 today, not significantly changed from prior day no prone    Ventilation Mode: CMV/AC  (Continuous Mandatory Ventilation/ Assist Control)  FiO2 (%): 75 %  Rate Set (breaths/minute): 20 breaths/min  Tidal Volume Set (mL): 380 mL  PEEP (cm H2O): 12 cmH2O  Oxygen Concentration (%): (S) 70 %  Resp: 20      Renal  Adequate renal function: Continue monitoring urine output and electrolytes  - low urine output today  Improved after irrigation  Increase free water, sodium is creeping up    ID:         COVID-19 pneumonia: Symptoms started on 12/1, tested positive on 12/8  positive; last test on 12/8.  Hospitalization:  steroids, baricitinib + remdesivir  Ventilatory support: 2 days intubated  -    GI  OG tube in stomach: will place post-pyloric when able  -    Nutrition  Initiate enteral nutrition: okay to feed gastric when supine  - continue TPN    Endocrine:  Hyperglycemia: Subcutaneous insulin correctional  -    Heme/Onc:  Mild leukocytosis and anemia: Related to acute illness  -    DVT Prophylaxis: Enoxaparin (Lovenox) SQ  GI Prophylaxis: PPI    Restraints: Restraints for medical healing needed: YES    Family update by  me today: No    Tamia Jordan MD    Time Spent on this Encounter   Billing:  I spent 30 minutes, exclusive of procedures and teaching, bedside and on the inpatient unit today managing the critical care of Sunita Barajas in relation to the issues listed in this note.    Main Plans for Today   Continue supportive care with mechanical ventilation  Remain supine    Interval History   Patient was intubated 12/19 night after several days of high amounts of noninvasive support.  She developed symptoms around 12/1 including cough and diarrhea.  Presented to [facility] emergency department on 12/8.    Physical Exam   Temp: 96.9  F (36.1  C) Temp src: Axillary Temp  Min: 96.8  F (36  C)  Max: 97.3  F (36.3  C) BP: (!) 140/50 Pulse: 71   Resp: 20 SpO2: 93 % O2 Device: Mechanical Ventilator    Vitals:    12/19/21 0400 12/20/21 0400 12/21/21 0400   Weight: 81 kg (178 lb 9.2 oz) 79.8 kg (175 lb 14.8 oz) 82.3 kg (181 lb 7 oz)     I/O last 3 completed shifts:  In: 1518.31 [I.V.:481.64; NG/GT:285]  Out: 1385 [Urine:1385]    Current Vitals:Temp:  [96.8  F (36  C)-97.3  F (36.3  C)] 96.9  F (36.1  C)  Pulse:  [49-90] 71  Resp:  [15-29] 20  BP: ()/(42-85) 140/50  FiO2 (%):  [63 %-90 %] 75 %  SpO2:  [90 %-100 %] 93 %   Awake, alert and following commands  PERRL and conjugate gaze  Orally intubated  Lungs clear  H-RR w/o murmur  Abdomen-soft, non tender, non distended  Extremities-warm and no edema  Lines: No erythema or discharge at entry site for PICC Line  Current lines are required for patient management    Medications     dextrose       dextrose       epoprostenol (VELETRI) 20 mcg/mL in sterile water inhalation solution 20 ng/kg/min (12/21/21 0818)     fentaNYL 100 mcg/hr (12/21/21 0732)     propofol (DIPRIVAN) infusion 30 mcg/kg/min (12/21/21 0900)    And     - MEDICATION INSTRUCTIONS -       norepinephrine 0.05 mcg/kg/min (12/21/21 0929)       [Held by provider] aspirin  81 mg Oral Daily     baricitinib  2 mg Oral Daily      dexamethasone  12 mg Intravenous Daily     enoxaparin ANTICOAGULANT  40 mg Subcutaneous Q12H     [Held by provider] FLUoxetine  60 mg Oral Daily     insulin aspart  1-6 Units Subcutaneous Q4H     [Held by provider] ipratropium-albuterol  2 puff Inhalation 4x Daily     lactated ringers  1,000 mL Intravenous Once     levothyroxine  75 mcg Oral Daily     lidocaine (viscous)  5 mL Topical Once     [Held by provider] lisinopril  20 mg Oral Daily     multivitamins w/minerals  15 mL Per Feeding Tube Daily     pantoprazole  40 mg Per Feeding Tube QAM AC    Or     pantoprazole (PROTONIX) IV  40 mg Intravenous QAM AC     protein modular  1 packet Per Feeding Tube TID     [Held by provider] simvastatin  40 mg Oral At Bedtime     sodium chloride (PF)  10-40 mL Intracatheter Q7 Days     sodium chloride (PF)  3 mL Intracatheter Q8H       Data   Recent Labs   Lab 12/21/21  0811 12/21/21  0429 12/21/21  0424 12/20/21  0435 12/20/21  0428 12/19/21  0518 12/19/21  0343 12/18/21  0800 12/18/21  0501   WBC  --   --  13.4*  --  15.6*  --   --   --  14.8*   HGB  --   --  11.2*  --  10.8*  --   --   --  12.2   MCV  --   --  100  --  99  --   --   --  95   PLT  --   --  223  --  213  --   --   --  295   INR  --   --   --   --  1.22*  --   --   --   --    NA  --   --  142  --  141  --   --   --  138   POTASSIUM  --   --  4.8  --  4.3  --  4.5  --  4.6   CHLORIDE  --   --  112*  --  111*  --   --   --  107   CO2  --   --  25  --  24  --   --   --  27   BUN  --   --  46*  --  36*  --   --   --  29   CR  --   --  0.92  --  0.79  --  0.84  --  0.87   ANIONGAP  --   --  5  --  6  --   --   --  4   SEDA  --   --  8.4*  --  8.0*  --   --   --  8.0*   * 131* 137*   < > 228*   < >  --    < > 129*   ALBUMIN  --   --   --   --  1.5*  --   --   --   --    PROTTOTAL  --   --   --   --  5.8*  --   --   --   --    BILITOTAL  --   --   --   --  0.2  --   --   --   --    ALKPHOS  --   --   --   --  53  --   --   --   --    ALT  --   --   --   --  15   --   --   --   --    AST  --   --   --   --  31  --   --   --   --     < > = values in this interval not displayed.     Recent Results (from the past 24 hour(s))   XR Abdomen Port 1 View    Narrative    ABDOMEN ONE VIEW PORTABLE  12/20/2021 4:11 PM     HISTORY: Status post feeding tube placement. Verify post pyloric  placement.    COMPARISON: None.      Impression    IMPRESSION: The tip of the feeding tube is projected over the  approximate location of the second portion of the duodenum, however,  the catheter may have turned back at the pylorus with the tip still in  the stomach. Recommend repeat x-ray immediately after injection of 10  mL Omnipaque to confirm postpyloric position.     BALA REYNA MD         SYSTEM ID:  [id number]

## 2021-12-21 NOTE — PLAN OF CARE
ICU End of Shift Summary.  For vital signs and complete assessments, please see documentation flowsheets.     Pertinent assessments:   Neuro: RASS -4, responsive to vigorous stimulation, PERRLA  CV: S1/S2, BP transiently hypotensive- requiring minimal vasoactive support, pulses palpable all extremities  Pulm: coarse LS, vented   GI: bowel sounds hypoactive, no BM, tube feeding started last night  : dwindelling baxter output  Skin: see flowsheet     Major Shift Events: none  Plan (Upcoming Events): tbd  Discharge/Transfer Needs: tbd    Bedside Shift Report Completed : y  Bedside Safety Check Completed: y

## 2021-12-21 NOTE — PLAN OF CARE
ICU End of Shift Summary.  For vital signs and complete assessments, please see documentation flowsheets.     Pertinent assessments: Pt continues to have RASS assessed at -5.  Titrated propofol with no real change towards goal.  Proned at 1740.  Keofeed 89cm at right nare placed this afternoon. Skin intact.  BP WDL on 0.01 levophed.  Major Shift Events: Keofeed placed , wean TPN and proned at 1740.  Plan (Upcoming Events): Continue to move toward RASS goal.  Start TF at 15/hr.  Pump not working, a new one was ordered.  Discharge/Transfer Needs: TBD    Bedside Shift Report Completed : Yes  Bedside Safety Check Completed: Yes

## 2021-12-22 NOTE — PROGRESS NOTES
[facility] Intensive Care Progress Note    Problem List  Acute hypoxic respiratory failure/ARDS related to  COVID-19 infection      Date of Service (when I saw the patient): 12/22/2021     Assessment & Plan   Sunita Barajas is a 76 year old female who was admitted on 12/8/2021. She was intubated late at night 12/19    Neurology:  sedated:   RASS goal -3/-4  Sedatives: fentanyl, propofol  Paralytic: no  -    Cardiovascular:  Norepinephrine low dose to keep map above 65:   - low dose      Pulmonary:        Acute hypoxic respiratory failure due to Covid: Intubated late on 12/19 after 5 days on BiPAP and several more days on high flow nasal cannula  CXR: ETT is 5 cm above the conor on 12/20 bilateral infiltrates  Prone positioning: did not help  Inhaled epoprostenol  - PF is 114 today, 100 yesterday, not proned overnight    Ventilation Mode: (S) PCV PLUS  (Pressure Control Ventilation Plus (added secondary Mode)  FiO2 (%): 85 %  Rate Set (breaths/minute): 20 breaths/min  Tidal Volume Set (mL): 380 mL  PEEP (cm H2O): 14 cmH2O  Oxygen Concentration (%): 100 %  Peak Inspiratory Pressure (cm H2O) (Drager Jennifer): 32  Resp: 18    Renal  Adequate renal function: Continue monitoring urine output and electrolytes  - adequate urine output, Improved after irrigation of Rojas  - Creat small bump today    ID:         COVID-19 pneumonia: Symptoms started on 12/1, tested positive on 12/8  positive; last test on 12/8.  Hospitalization:  steroids, baricitinib + remdesivir  Ventilatory support: 3 days intubated  -    GI  OG tube in stomach: will place post-pyloric when able  -    Nutrition  Initiate enteral nutrition: okay to feed gastric when supine  - continue TPN    Endocrine:  Hyperglycemia: Subcutaneous insulin correctional  -    Heme/Onc:  Mild leukocytosis and anemia: Related to acute illness  -    DVT Prophylaxis: Enoxaparin (Lovenox) SQ  GI Prophylaxis: PPI    Restraints: Restraints  for medical healing needed: YES    Family update by me today: Yes  son Diego Cantrell MD Nikki    Time Spent on this Encounter   Billing:  I spent 30 minutes, exclusive of procedures and teaching, bedside and on the inpatient unit today managing the critical care of Sunita Barajas in relation to the issues listed in this note.    Main Plans for Today   Continue supportive care with mechanical ventilation  Remain supine  Change code status    Interval History   No issues overnight.     Patient was intubated 12/19 night after several days of high amounts of noninvasive support.  She developed symptoms around 12/1 including cough and diarrhea.  Presented to [facility] emergency department on 12/8.    Physical Exam   Temp: 98.5  F (36.9  C) Temp src: Oral Temp  Min: 97.8  F (36.6  C)  Max: 98.5  F (36.9  C) BP: 115/54 Pulse: 61   Resp: 18 SpO2: 91 % O2 Device: Mechanical Ventilator    Vitals:    12/20/21 0400 12/21/21 0400 12/22/21 0445   Weight: 79.8 kg (175 lb 14.8 oz) 82.3 kg (181 lb 7 oz) 84.4 kg (186 lb 1.1 oz)     I/O last 3 completed shifts:  In: 1856.26 [I.V.:616.26; NG/GT:610]  Out: 1095 [Urine:1095]    Current Vitals:Temp:  [97.8  F (36.6  C)-98.5  F (36.9  C)] 98.5  F (36.9  C)  Pulse:  [] 61  Resp:  [9-38] 18  BP: ()/(38-71) 115/54  FiO2 (%):  [75 %-85 %] 85 %  SpO2:  [86 %-99 %] 91 %   Sedated, unresponsive  PERRL and conjugate gaze  Orally intubated  Lungs clear  H-RR w/o murmur  Abdomen-soft, non tender, non distended  Extremities-warm and no edema  Lines: No erythema or discharge at entry site for PICC Line  Current lines are required for patient management    Medications     dextrose       dextrose       epoprostenol (VELETRI) 20 mcg/mL in sterile water inhalation solution 20 ng/kg/min (12/22/21 0055)     fentaNYL 100 mcg/hr (12/22/21 0900)     propofol (DIPRIVAN) infusion 20 mcg/kg/min (12/22/21 0900)    And     - MEDICATION INSTRUCTIONS -       norepinephrine 0.01 mcg/kg/min (12/22/21 0900)        [Held by provider] aspirin  81 mg Oral Daily     baricitinib  2 mg Oral Daily     dexamethasone  12 mg Intravenous Daily     enoxaparin ANTICOAGULANT  40 mg Subcutaneous Q12H     [Held by provider] FLUoxetine  60 mg Oral Daily     insulin aspart  1-6 Units Subcutaneous Q4H     [Held by provider] ipratropium-albuterol  2 puff Inhalation 4x Daily     lactated ringers  1,000 mL Intravenous Once     levothyroxine  75 mcg Oral Daily     lidocaine (viscous)  5 mL Topical Once     multivitamins w/minerals  15 mL Per Feeding Tube Daily     pantoprazole  40 mg Per Feeding Tube QAM AC    Or     pantoprazole (PROTONIX) IV  40 mg Intravenous QAM AC     protein modular  1 packet Per Feeding Tube TID     [Held by provider] simvastatin  40 mg Oral At Bedtime     sodium chloride (PF)  10-40 mL Intracatheter Q7 Days     sodium chloride (PF)  3 mL Intracatheter Q8H       Data   Recent Labs   Lab 12/22/21  0752 12/22/21  0452 12/22/21  0341 12/21/21  0429 12/21/21  0424 12/20/21  0435 12/20/21 0428   WBC  --  10.9  --   --  13.4*  --  15.6*   HGB  --  9.8*  --   --  11.2*  --  10.8*   MCV  --  102*  --   --  100  --  99   PLT  --  190  --   --  223  --  213   INR  --   --   --   --   --   --  1.22*   NA  --  140  --   --  142  --  141   POTASSIUM  --  4.5  --   --  4.8  --  4.3   CHLORIDE  --  110*  --   --  112*  --  111*   CO2  --  25  --   --  25  --  24   BUN  --  53*  --   --  46*  --  36*   CR  --  1.13*  --   --  0.92  --  0.79   ANIONGAP  --  5  --   --  5  --  6   SEDA  --  7.8*  --   --  8.4*  --  8.0*   GLC 98 113* 118*   < > 137*   < > 228*   ALBUMIN  --   --   --   --   --   --  1.5*   PROTTOTAL  --   --   --   --   --   --  5.8*   BILITOTAL  --   --   --   --   --   --  0.2   ALKPHOS  --   --   --   --   --   --  53   ALT  --   --   --   --   --   --  15   AST  --   --   --   --   --   --  31    < > = values in this interval not displayed.     No results found for this or any previous visit (from the past 24  hour(s)).

## 2021-12-22 NOTE — PROGRESS NOTES
[facility] ICU VENTILATOR RESPIRATORY NOTE     Date of Admission: 12/17/2021  Date of Intubation (most recent): 12/19/2021  Reason for Mechanical Ventilation: Resp Failure   Number of Days on Mechanical Ventilation: 4  Met Criteria for Pressure Support Trial: NO  Reason for No Pressure Support Trial: Patient is still on PEEP+12 cm H2O, FiO2 80% , and Full strength VELETRI.    0833  Ventilation Mode: CMV/AC  (Continuous Mandatory Ventilation/ Assist Control)  FiO2 (%): 85 %  Rate Set (breaths/minute): 20 breaths/min  Tidal Volume Set (mL): 380 mL  PEEP (cm H2O): 12 cmH2O  Oxygen Concentration (%): 85 %  Resp: 19

## 2021-12-22 NOTE — PROGRESS NOTES
[facility]  Hospitalist Progress Note  Hayley Duke MD 12/22/2021    Reason for Stay (Diagnosis): Hypoxic respiratory failure         Assessment and Plan:      Summary of Stay: Sunita Barajas is a 76 year old female with a history of htn/hlp/hypothyroidism and depression. She was admitted on 12/8/2021 with hypoxic respiratory failure from COVID 19 pna    She is not vaccinated.     She required high level O2 on presentation to the ED.  Labs notable for VALE with creat 1.25, crp 154.  CT scan consistent with bilateral covid pna but negative for PE.     Her oxygen requirements continued to increase and ultimately she was intubated on 12/19.  Hospitalization has been complicated by ongoing respiratory failure/persistent hypotension necessitating pressor support    She is on minimal norepi at this stage    Problem List:   Acute hypoxic respiratory failure  COVID 19 pna  pneumomediastinum  Her sx began about a week pta with cough and generalized weakness.  She fell 2-3 days before presenting to our ER and had just been laying on the floor since.  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  On presentation to the ED she required 10 L FM to get her O2 sats into the 90's.  -Not vaccinated  -Date of sx onset late November is best guess  -Tested positive 12/8/2021  -had progressed to full bipap support for several days before getting intubated on 12/19  -s/p 5 days of remdesivir, completed on 12/12  -s/p 10 days dex at 6 mg, now on dex 12 mg started on 12/20  -baricitinib started on 12/13, on day 10/14  -prone positioning apparently did not help  -enox 40 mg bid  -CT scan on admission with bilateral confluent GGO consistent with COVID pna, No PE.  Repeat CT scan 12/16 with worsening bilateral GGO, still no PE, but notable for extensive pneumomediastinum extending into the neck bilaterally as well as the  "chest wall.   -MV with PEEP 14, increasing O2 needs now at 80 % (peak O2 requirements 90 %)     Septic shock from viral pna/ARDS and ESBL UTI  -continues on norepi, will start midodrine and see if we can get her off one of her drips  -completed 5 day course of meropenem on 12/15    Urinary retention  Rojas placed 12/15    VALE/Mild rhabdo with   Baseline creat 1.1-1.2, 1.54 on admit.  Currently creat 0.9.  High and rising bun but hgb stable, not on diuretics  -received low level IVF and has now normalized   -daily bmps    Hyperglycemia hgb A1c 5.6 % so likely related to steroids/TF  -cont ISS, minimal requirements at the moment    htn   Home regimen is lisinopril 20 mg every day  -on hold in light of septic shock and need for pressor support    hlp  -back on home simva 40 mg qd    Hypothyroidism  -back on home replacement with levothyroxine     Depression   -back on home fluoxetine 60 mg qd    FEN net negative 3 L weights are stable.  On TF    Incidentally noted ovarian cysts on admission CT scan, increased in size from prior study  -nonurgent f/u US       DVT Prophylaxis: Enoxaparin (Lovenox) SQ  Code Status: Full Code  Functional Status:  unknown  Diet:NPO on TF  Rojas:in place for critical illness  Access PIV x 2 and a PICC    Disposition Plan   Expected discharge in tbd days to tbd once above sorted through.     Entered: Hayley Duke 12/22/2021, 3:19 AM               Interval History (Subjective):      Tenuous respiratory status.                  Physical Exam:      Last Vital Signs:  /53   Pulse 66   Temp 97.9  F (36.6  C) (Axillary)   Resp 20   Ht 1.549 m (5' 1\")   Wt 82.3 kg (181 lb 7 oz)   SpO2 95%   BMI 34.28 kg/m      I/O:    Puffy all over but not true edema lungs coarse fair air movement rrr no mrg belly obese but otherwise s/nt/nd skin warm and dry no cyanosis or clubbing intubated and sedated           Medications:      All current medications were reviewed with changes " reflected in problem list.         Data:      All new lab and imaging data was reviewed.   Labs:  Recent Labs   Lab 12/22/21  1146 12/22/21  0752 12/22/21 0452   NA  --   --  140   POTASSIUM  --   --  4.5   CHLORIDE  --   --  110*   CO2  --   --  25   ANIONGAP  --   --  5   *   < > 113*   BUN  --   --  53*   CR  --   --  1.13*   GFRESTIMATED  --   --  50*   SEDA  --   --  7.8*    < > = values in this interval not displayed.     Recent Labs   Lab 12/22/21 0452   WBC 10.9   HGB 9.8*   HCT 32.9*   *        Recent Labs   Lab 12/22/21  1146 12/22/21  0752 12/22/21  0452 12/22/21  0341 12/21/21  2337   * 98 113* 118* 121*     Recent Labs   Lab 12/20/21  0428   AST 31   ALT 15   ALKPHOS 53   BILITOTAL 0.2      Imaging:

## 2021-12-22 NOTE — PROGRESS NOTES
[facility] ICU VENTILATOR RESPIRATORY NOTE     Date of Admission: 12/17/2021  Date of Intubation (most recent): 12/19/2021  Reason for Mechanical Ventilation: Resp Failure   Number of Days on Mechanical Ventilation: 4  Met Criteria for Pressure Support Trial: NO  Reason for No Pressure Support Trial: Patient is still on PEEP+12 cm H2O, FiO2 80% , and Full strength VELETRI.    Ventilation Mode: CMV/AC  (Continuous Mandatory Ventilation/ Assist Control)  FiO2 (%): 85 %  Rate Set (breaths/minute): 20 breaths/min  Tidal Volume Set (mL): 380 mL  PEEP (cm H2O): 12 cmH2O  Oxygen Concentration (%): 80 %  Resp: 21    Recent Labs   Lab 12/21/21  0502 12/20/21  0559 12/20/21  0040 12/19/21  0627   PH 7.32* 7.32* 7.30* 7.42   PCO2 48* 47* 49* 40   PO2 77* 81 122* 66*   HCO3 25 24 24 26   O2PER 75 80 100 100       Plan: Continue to monitor Pt's respiratory status.     Chiquis Peterson, RT

## 2021-12-22 NOTE — PLAN OF CARE
ICU End of Shift Summary.  For vital signs and complete assessments, please see documentation flowsheets.     Neuro: RASS -4.   Cardiac: SB-SR, still requiring levo to maintain maps > 65. Attempted to wean off, maps fell to 50s.  Resp: Vent supported, lungs coarse. Had to increase fio2 to 85% to maintain sats  GI: bs hypo/faint. LBM 12/27. Gave senna. Tolerating TF at 30.  : baxter in place, on the lower end of urine output.  Skin: scattered bruising/scabs, blanchable redness to sacrum- mepilex applied.  Lines: keofeed, picc, pivx2, baxter  Drips: levo, fent, prop    Major shift events  Required increase in fio2  Plan (Upcoming Events): tbd, continue supportive cares  Discharge/Transfer Needs: TBD

## 2021-12-22 NOTE — PLAN OF CARE
ICU End of Shift Summary.  For vital signs and complete assessments, please see documentation flowsheets.     Pertinent assessments:  LS course, No BM for days, scheduled BID lactulose started. Levo attempted to turn off, pt pressured went into the 80s and maps in the 40s/50s. Levo back on 0.01. P/F ratio 114 this AM. Cont. To keep pt supine. RASS -4. Tele SR.   Major Shift Events: Palliaiative spoke with family, pt is now DNR.   Plan (Upcoming Events): Cont. ICU cares  Discharge/Transfer Needs: TBD    Bedside Shift Report Completed : yes  Bedside Safety Check Completed:yes

## 2021-12-23 NOTE — PROGRESS NOTES
[facility] ICU VENTILATOR RESPIRATORY NOTE     Date of Admission: 12/17/2021  Date of Intubation (most recent): 12/19/2021  Reason for Mechanical Ventilation: Resp Failure   Number of Days on Mechanical Ventilation: 5  Met Criteria for Pressure Support Trial: NO  Reason for No Pressure Support Trial: Patient is still on PEEP+14 cm H2O, FiO2 90% , and Full strength VELETRI    Recent Labs   Lab 12/22/21  0756 12/21/21  0502 12/20/21  0559 12/20/21  0040   PH 7.34* 7.32* 7.32* 7.30*   PCO2 45 48* 47* 49*   PO2 97 77* 81 122*   HCO3 24 25 24 24   O2PER 85 75 80 100     Ventilation Mode: PCV PLUS  (Pressure Control Ventilation Plus (added secondary Mode)  FiO2 (%): 90 %  Rate Set (breaths/minute): 20 breaths/min  Tidal Volume Set (mL): 380 mL  PEEP (cm H2O): 14 cmH2O  Oxygen Concentration (%): 90 %  Peak Inspiratory Pressure (cm H2O) (Drager Jennifer): 32  Resp: 20    RT Brock on 12/23/2021 at 4:50 AM

## 2021-12-23 NOTE — PLAN OF CARE
ICU End of Shift Summary.  For vital signs and complete assessments, please see documentation flowsheets.     Neuro: RASS -3/-4  Cardiac: SB-SR, continues on levo  Resp: Vent supported, lungs coarse, minimal secretions  GI: Bowel sounds became more audible throughout the night, on scheduled lactulose, still no bm- lbm 12/17  : baxter in place, urine clear yellow  Skin: see flowsheets  Lines: keofeed, picc, baxter  Drips: levo, prop, fent    Plan (Upcoming Events): continue supportive cares  Discharge/Transfer Needs: TBD

## 2021-12-23 NOTE — PROGRESS NOTES
[facility]  Palliative Care Progress Note  Text Page     Assessment & Plan    Sunita Barajas is a 76 year old female who was admitted on 12/8/2021.   Consulted by Tamia Jordan MD  to assist with symptom management, goals of care, and development of plan of care     Recommendations:  1. Goals of Care- No CPR- Pre-arrest intubation OK  Hospitalization goals discussed  Phone call to son Sebastian Barajas  We discussed code status. Code status changed to no CPR/ pre arrest-intubation ok.  He is uncertain if he and his brother would decide on a trach, not knowing their mother's wishes.  Stated we have time to decide this next step.     Sebastian agrees with tube feedings and current medical management.    Decisional Capacity- Unreliable. Patient does not have an advance directive in . Per  informed consent policy next of kin should be involved if patient becomes unable.   Next of kin Sebastian Barajas. Health Care directive reviewed. Emailed 12/21/21 to honoring choices for urgent review. Will follow up in am on status of review.     POLST  Not in EMR, reasonable to complete prior to discharge     2. Pain history of chronic back pain. Chemical sedation for medical healing and pain    Patient's opioid use in past 24 hours:  Fentanyl 125-100 mcg/hr, lower dose since 0018 am =--- mg Daily Morphine Equivalent  [place] Board of Pharmacy Data Base Reviewed:    YES; As expected, no concern for misuse/abuse of controlled medications based on this report. No controlled substance is past 12 months   ORT  NA  ( add dot phrase .FRHORT if warranted)     3. Dyspnea acute respiratory failure secondary to C0V2 PNA, diagnosed 12/17/21 day 2 on mechanical ventilation    -mechanical ventilator  -diprivan/fentanyl for medical healing   -respiratory hygiene   -medical management      4. Spiritual Care  Oriented to Spiritual Health as part of Palliative Care team. appreciate input from Christina Mei, staff   M.Div  Spiritual Background:  undesiginated      5. Care Planning  to be determined   Medications for discharge      Medical Decision Making and Goals of Care:  Discussed on December 21, 2021 with Kalyani BERMUDEZ, CNP:  I talked with the patient's son Diego Barajas by phone from 15:00- 15:15.  I introduced my self and the purpose of my involvement on the medical team.  He tells me he dropped off the patient health care directive. He read from the directive stating she wants all measures done, unless she is in a persistent vegetative state, if she would have poor quality of life or if treatment options have been exhausted.  He also stated that he was her primary health car agent .  He is pleased and in agreement with present level of care.    I asked what the patient would want if her heart would stop while in the hospital here.  I did not feel this would happen imminently, however it is possible with the amount of stress she is under.  He did not think she would want CPR under this circumstance. However, it is a grey area. Asked if he had family to discuss this with.Yes, He has a brother. Encouraged him to discuss with his brother and reach consensus.    Diego will call back in am after talking to his brother. I informed him I would review the HCD, and facilitate review by honoring choices so the document can be entered in the the EMR       Kalyani Martell RN,PGMT- BC, APRN, CNP,ACHPN  Pain Management and Palliative Care  [facility]  Pgr: [phone]      Time Spent on this Encounter   Total unit/floor time 15 minutes, time consisted of the following, examination of the patient, reviewing the record and completing documentation. >50% of time spent in counseling and coordination of care, Bedside Nurse Padmini Foreman RN  and Hospitalist Hayley Duke MD , Tamia Jordan MD  Time spend counseling with family consisted of the following topics, goals of care and  symptom management.    Review of Systems    CONSTITUTIONAL: NEGATIVE for fever, chills, change in weight  ENT/MOUTH: NEGATIVE for ear, mouth and throat changes., POSITIVE  ET tube  RESP: full vent support   CV: NEGATIVE for peripheral edema    Palliative Symptom Review (0=no symptom/no concern, 1=mild, 2=moderate, 3=severe):  Unable chemical sedation        Physical Exam   Temp:  [97.8  F (36.6  C)-98.5  F (36.9  C)] 98.5  F (36.9  C)  Pulse:  [54-95] 62  Resp:  [9-29] 23  BP: ()/(38-64) 114/51  FiO2 (%):  [80 %-90 %] 90 %  SpO2:  [86 %-99 %] 95 %  186 lbs 1.09 oz  Exam:  GEN: sedated appears comfortable, occasional cough.  HEENT:  Normocephalic/atraumatic, no scleral icterus, no nasal discharge, mouth moist.  CV: normal rate no edema BLE.  RESP:   Full vent support   ABD:  Rounded, soft, non-tender/non-distended.      Psych:  Sedated chemically, no spontaneous movement     Medications     dextrose       epoprostenol (VELETRI) 20 mcg/mL in sterile water inhalation solution 20 ng/kg/min (12/22/21 1837)     fentaNYL 100 mcg/hr (12/22/21 1900)     propofol (DIPRIVAN) infusion 20 mcg/kg/min (12/22/21 1900)    And     - MEDICATION INSTRUCTIONS -       norepinephrine 0.01 mcg/kg/min (12/22/21 1900)       [Held by provider] aspirin  81 mg Oral Daily     baricitinib  2 mg Oral Daily     dexamethasone  12 mg Intravenous Daily     enoxaparin ANTICOAGULANT  40 mg Subcutaneous Q12H     [Held by provider] FLUoxetine  60 mg Oral Daily     insulin aspart  1-6 Units Subcutaneous Q4H     [Held by provider] ipratropium-albuterol  2 puff Inhalation 4x Daily     lactated ringers  1,000 mL Intravenous Once     lactulose  20 g Oral or Feeding Tube BID     levothyroxine  75 mcg Oral Daily     lidocaine (viscous)  5 mL Topical Once     multivitamins w/minerals  15 mL Per Feeding Tube Daily     pantoprazole  40 mg Per Feeding Tube QAM AC    Or     pantoprazole (PROTONIX) IV  40 mg Intravenous QAM AC     protein modular  1 packet Per  Feeding Tube TID     [Held by provider] simvastatin  40 mg Oral At Bedtime     sodium chloride (PF)  10-40 mL Intracatheter Q7 Days     sodium chloride (PF)  3 mL Intracatheter Q8H       Data   Results for orders placed or performed during the hospital encounter of 12/08/21 (from the past 24 hour(s))   Glucose by meter   Result Value Ref Range    GLUCOSE BY METER POCT 137 (H) 70 - 99 mg/dL   Glucose by meter   Result Value Ref Range    GLUCOSE BY METER POCT 121 (H) 70 - 99 mg/dL   Glucose by meter   Result Value Ref Range    GLUCOSE BY METER POCT 118 (H) 70 - 99 mg/dL   CBC with Platelets & Differential    Narrative    The following orders were created for panel order CBC with Platelets & Differential.  Procedure                               Abnormality         Status                     ---------                               -----------         ------                     CBC with platelets and d...[[id number]]  Abnormal            Final result                 Please view results for these tests on the individual orders.   Basic metabolic panel   Result Value Ref Range    Sodium 140 133 - 144 mmol/L    Potassium 4.5 3.4 - 5.3 mmol/L    Chloride 110 (H) 94 - 109 mmol/L    Carbon Dioxide (CO2) 25 20 - 32 mmol/L    Anion Gap 5 3 - 14 mmol/L    Urea Nitrogen 53 (H) 7 - 30 mg/dL    Creatinine 1.13 (H) 0.52 - 1.04 mg/dL    Calcium 7.8 (L) 8.5 - 10.1 mg/dL    Glucose 113 (H) 70 - 99 mg/dL    GFR Estimate 50 (L) >60 mL/min/1.73m2   Magnesium   Result Value Ref Range    Magnesium 2.3 1.6 - 2.3 mg/dL   Phosphorus   Result Value Ref Range    Phosphorus 3.4 2.5 - 4.5 mg/dL   CK total   Result Value Ref Range    CK 12 (L) 30 - 225 U/L   Triglycerides   Result Value Ref Range    Triglycerides 128 <150 mg/dL   CBC with platelets and differential   Result Value Ref Range    WBC Count 10.9 4.0 - 11.0 10e3/uL    RBC Count 3.24 (L) 3.80 - 5.20 10e6/uL    Hemoglobin 9.8 (L) 11.7 - 15.7 g/dL    Hematocrit 32.9 (L) 35.0 - 47.0 %    MCV  102 (H) 78 - 100 fL    MCH 30.2 26.5 - 33.0 pg    MCHC 29.8 (L) 31.5 - 36.5 g/dL    RDW 13.5 10.0 - 15.0 %    Platelet Count 190 150 - 450 10e3/uL    % Neutrophils 92 %    % Lymphocytes 4 %    % Monocytes 3 %    % Eosinophils 0 %    % Basophils 0 %    % Immature Granulocytes 1 %    NRBCs per 100 WBC 0 <1 /100    Absolute Neutrophils 10.0 (H) 1.6 - 8.3 10e3/uL    Absolute Lymphocytes 0.5 (L) 0.8 - 5.3 10e3/uL    Absolute Monocytes 0.3 0.0 - 1.3 10e3/uL    Absolute Eosinophils 0.0 0.0 - 0.7 10e3/uL    Absolute Basophils 0.0 0.0 - 0.2 10e3/uL    Absolute Immature Granulocytes 0.1 <=0.4 10e3/uL    Absolute NRBCs 0.0 10e3/uL   Glucose by meter   Result Value Ref Range    GLUCOSE BY METER POCT 98 70 - 99 mg/dL   Blood gas arterial with oxyhemoglobin   Result Value Ref Range    pH Arterial 7.34 (L) 7.35 - 7.45    pCO2 Arterial 45 35 - 45 mm Hg    pO2 Arterial 97 80 - 105 mm Hg    Bicarbonate Arterial 24 21 - 28 mmol/L    Oxyhemoglobin Arterial 97 92 - 100 %    Base Excess/Deficit (+/-) -1.7 -9.0 - 1.8 mmol/L    FIO2 85    Glucose by meter   Result Value Ref Range    GLUCOSE BY METER POCT 109 (H) 70 - 99 mg/dL   Glucose by meter   Result Value Ref Range    GLUCOSE BY METER POCT 118 (H) 70 - 99 mg/dL

## 2021-12-23 NOTE — PROGRESS NOTES
[facility]  Hospitalist Progress Note  Hayley Duke MD 12/23/2021    Reason for Stay (Diagnosis): Hypoxic respiratory failure         Assessment and Plan:      Summary of Stay: Sunita Barajas is a 76 year old female with a history of htn/hlp/hypothyroidism and depression. She was admitted on 12/8/2021 with hypoxic respiratory failure from COVID 19 pna    She is not vaccinated.     She required high level O2 on presentation to the ED.  Labs notable for VALE with creat 1.25, crp 154.  CT scan consistent with bilateral covid pna but negative for PE.     Her oxygen requirements continued to increase and ultimately she was intubated on 12/19.  Hospitalization has been complicated by ongoing respiratory failure/persistent hypotension necessitating pressor support        Problem List:   Acute hypoxic respiratory failure  COVID 19 pna  pneumomediastinum  Her sx began about a week pta with cough and generalized weakness.  She fell 2-3 days before presenting to our ER and had just been laying on the floor since.  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  On presentation to the ED she required 10 L FM to get her O2 sats into the 90's.  -Not vaccinated  -Date of sx onset late November is best guess  -Tested positive 12/8/2021  -had progressed to full bipap support for several days before getting intubated on 12/19  -s/p 5 days of remdesivir, completed on 12/12  -s/p 10 days dex at 6 mg, now on dex 12 mg started on 12/20  -baricitinib started on 12/13, on day 11/14  -prone positioning apparently did not help  -enox 40 mg bid  -CT scan on admission with bilateral confluent GGO consistent with COVID pna, No PE.  Repeat CT scan 12/16 with worsening bilateral GGO, still no PE, but notable for extensive pneumomediastinum extending into the neck bilaterally as well as the chest wall.   -MV with PEEP 14,  "increasing O2 needs now at 90 %      Septic shock from viral pna/ARDS and ESBL UTI  -continues on norepi,   -completed 6 day course of meropenem on 12/16    Urinary retention  Rojas placed 12/15    VLAE/Mild rhabdo with   Baseline creat 1.1-1.2, 1.54 on admit.  Currently creat 1.16  High and rising bun but hgb stable, not on diuretics  -received low level IVF and has now normalized   -daily bmps    Hyperglycemia hgb A1c 5.6 % so likely related to steroids/TF  -cont ISS, minimal requirements at the moment    htn   Home regimen is lisinopril 20 mg every day  -on hold in light of septic shock and need for pressor support    hlp  -back on home simva 40 mg qd    Hypothyroidism  -back on home replacement with levothyroxine     Depression   -back on home fluoxetine 60 mg qd    FEN net negative 3 L weights are stable.  On TF    Incidentally noted ovarian cysts on admission CT scan, increased in size from prior study  -nonurgent f/u US       DVT Prophylaxis: Enoxaparin (Lovenox) SQ  Code Status: Full Code  Functional Status:    Diet:NPO on TF  Rojas:in place for critical illness  Access PIV x 2 and a PICC    Disposition Plan   Expected discharge in tbd days to tbd once above sorted through.     Entered: Hayley Duke 12/23/2021, 12:15 PM         We are operating under sub optimal conditions in the setting of a world wide pandemic, hospitals are running at full capacity with limited ICU bed availability. We are providing the best possible patient care with limited resources.      Discussed with danette Diego by phone      Interval History (Subjective):      Tenuous respiratory status, needed increase in FiO2 overnight.  Also unable to wean off norepi this am.                   Physical Exam:      Last Vital Signs:  BP 97/43   Pulse 73   Temp 98.3  F (36.8  C) (Axillary)   Resp 18   Ht 1.549 m (5' 1\")   Wt 86.6 kg (190 lb 14.7 oz)   SpO2 (!) 88%   BMI 36.07 kg/m      I/O:    Puffy all over but not true edema lungs " coarse fair air movement rrr no mrg belly obese but otherwise s/nt/nd skin warm and dry no cyanosis or clubbing intubated and sedated           Medications:      All current medications were reviewed with changes reflected in problem list.         Data:      All new lab and imaging data was reviewed.   Labs:  Recent Labs   Lab 12/23/21  1153 12/23/21  0801 12/23/21  0443 12/22/21  0752 12/22/21 0452   NA  --   --   --   --  140   POTASSIUM  --   --  4.4  --  4.5   CHLORIDE  --   --   --   --  110*   CO2  --   --   --   --  25   ANIONGAP  --   --   --   --  5   *   < >  --    < > 113*   BUN  --   --   --   --  53*   CR  --   --  1.16*  --  1.13*   GFRESTIMATED  --   --  49*  --  50*   SEDA  --   --   --   --  7.8*    < > = values in this interval not displayed.     Recent Labs   Lab 12/22/21 0452   WBC 10.9   HGB 9.8*   HCT 32.9*   *        Recent Labs   Lab 12/23/21  1153 12/23/21  0801 12/23/21  0422 12/22/21  2359 12/22/21  1957   * 93 98 120* 137*     Recent Labs   Lab 12/20/21 0428   AST 31   ALT 15   ALKPHOS 53   BILITOTAL 0.2      Imaging:

## 2021-12-23 NOTE — PLAN OF CARE
ICU End of Shift Summary.  For vital signs and complete assessments, please see documentation flowsheets.      Pertinent assessments:   Neuro: RASS -3. Continues on sedation  Cardiac: BP Soft - levophed running. Tele: SR /SB  Resp: Continues on total vent support. LS dim, course.   GI: BS active. TF in place running at goal   : baxter catheter in place - minimally adequate outputs approx 30 - 45 ml / hr  Lines: PICC, a line.   Drips: fent, propofol, levophed.     Major Shift Events:   Decreased o2 sats, io2 increase to 100%.     Plan (Upcoming Events): Continue to attempt to ween sedation, vent as able.   Discharge/Transfer Needs:  TBD     Bedside Shift Report Completed : TBD   Bedside Safety Check Completed: TBD

## 2021-12-24 NOTE — PROGRESS NOTES
DATA:    VENT DAY#  6    CURRENT SETTINGS:  VENT SETTINGS  Ventilation Mode: (S) CMV/AC  (Continuous Mandatory Ventilation/ Assist Control) (changed by MD)  FiO2 (%): 100 %  Rate Set (breaths/minute): 20 breaths/min  Tidal Volume Set (mL): 380 mL  PEEP (cm H2O): 16 cmH2O  Oxygen Concentration (%): 100 %  Peak Inspiratory Pressure (cm H2O) (Drager Jennifer): 32  Resp: 21              FIO2:   100    SBT: n/a  SECRETIONS:  Small, cloudy, white, thin  02 SATS:  89-91%    ETT SIZE 7.0  Secured at  22 cm at teeth    Respiratory Medications: continuous FS veletri     ABG: Results for VIOLET CAO (MRN [id number]) as of 12/24/2021 10:14   Ref. Range 12/24/2021 08:23   pH Arterial Latest Ref Range: 7.35 - 7.45  7.31 (L)   pCO2 Arterial Latest Ref Range: 35 - 45 mm Hg 49 (H)   PO2 Arterial Latest Ref Range: 80 - 105 mm Hg 58 (L)   Bicarbonate Arterial Latest Ref Range: 21 - 28 mmol/L 24   Base Excess Art Latest Ref Range: -9.0 - 1.8 mmol/L -2.2   FIO2 Unknown 100   Oxyhemoglobin Arterial Latest Ref Range: 92 - 100 % 88 (L)       NOTE / PLAN:   Sputum sample sent today. Increased O2 and PEEP needs in last 24 hours. Decision made to prone. Proned at 1215.   Doretha Lux, LRT, BSRT-RRT

## 2021-12-24 NOTE — PROGRESS NOTES
0900  Pt hypoxix sats in low 80s on mech. Vent Fio2 100% & 16 PEEP. Also on full strength veletri.  ABG resulted 7.31/49/58/24. (P/F 58)  Discussed with  Dr. Jordan and Dr. Duke. Order to do CXR and possibly prone pt.   Sputum yellow thick, cultures sent.     1500  Pt oliguric & creat up in AM, baxter flushed not effective. Discussed with Dr. Duke. Order to increase free H2O flushes.     1825  ICU End of Shift Summary.  For vital signs and complete assessments, please see documentation flowsheets.     Pertinent assessments: Remains on full vent support. Sats in 80s. Suctioned for moderate thick yellow secretions. Veletri continued inline. Sedated at goal with prop and fent. Rhythm sinus. On levo to maintain good MAP. Tolerating TF. No BM, bowel regimen initiated. Baxter with low uop/olguric, water flushes updated. BG WNL. Pronated in AM. Family updated.   Major Shift Events: Desated to low 80s, CXR complete and Sputum cultured. Sedation and vasopressor increased.  Pronated @ 1130  Plan (Upcoming Events): Continue ICU level cares  Discharge/Transfer Needs: TBD    Bedside Shift Report Completed : Y  Bedside Safety Check Completed:Y

## 2021-12-24 NOTE — PROGRESS NOTES
[facility]  Hospitalist Progress Note  Hayley Duke MD 12/24/2021    Reason for Stay (Diagnosis): Hypoxic respiratory failure         Assessment and Plan:      Summary of Stay: Sunita Barajas is a 76 year old female with a history of htn/hlp/hypothyroidism and depression. She was admitted on 12/8/2021 with hypoxic respiratory failure from COVID 19 pna    She is not vaccinated.     She required high level O2 on presentation to the ED.  Labs notable for VALE with creat 1.25, crp 154.  CT scan consistent with bilateral covid pna but negative for PE.     Her oxygen requirements continued to increase and ultimately she was intubated on 12/19.  Hospitalization has been complicated by ongoing respiratory failure/persistent hypotension necessitating pressor support        Problem List:   Acute hypoxic respiratory failure  COVID 19 pna  pneumomediastinum  Her sx began about a week pta with cough and generalized weakness.  She fell 2-3 days before presenting to our ER and had just been laying on the floor since.  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  On presentation to the ED she required 10 L FM to get her O2 sats into the 90's.  -Not vaccinated  -Date of sx onset late November is best guess  -Tested positive 12/8/2021  -had progressed to full bipap support for several days before getting intubated on 12/19  -s/p 5 days of remdesivir, completed on 12/12  -s/p 10 days dex at 6 mg, now on dex 12 mg started on 12/20  -baricitinib started on 12/13, on day 11/14  -prone positioning apparently did not help  -enox 40 mg bid  -CT scan on admission with bilateral confluent GGO consistent with COVID pna, No PE.  Repeat CT scan 12/16 with worsening bilateral GGO, still no PE, but notable for extensive pneumomediastinum extending into the neck bilaterally as well as the chest wall.   -MV with PEEP 14->16, sats   %, was persistently in the 80-85 % range despite vent adjustments, ultimately proned and after several hours and with repositioning sats no 90%.  Discussed with danette Cortez by phone.  Her course is so tenuous that I'm not sure which direction she will go.  I recommended that he come in for a compassionate visit.  I also told him that I did not know if she might recover and hang on for a while or worsen and die.     Septic shock from viral pna/ARDS and ESBL UTI  -continues on norepi,   -completed 6 day course of meropenem on 12/16    VALE/Mild rhabdo with /urinary retention  Baseline creat 1.1-1.2, 1.54 on admit had returned to baseline but then jumped to 1.4 this am   -Rojas placed 12/15  -no IVF given tenuous respiratory status  -increased Free water to 250 q 4    Hyperglycemia hgb A1c 5.6 % so likely related to steroids/TF  -cont ISS, minimal requirements at the moment    htn   Home regimen is lisinopril 20 mg every day  -on hold in light of VALE/septic shock and need for pressor support    hlp  -back on home simva 40 mg qd    Hypothyroidism  -back on home replacement with levothyroxine     Depression   -back on home fluoxetine 60 mg qd    FEN net negative 3 L weights are stable.  On TF    Incidentally noted ovarian cysts on admission CT scan, increased in size from prior study  -nonurgent f/u US       DVT Prophylaxis: Enoxaparin (Lovenox) SQ  Code Status: Full Code  Functional Status:    Diet:NPO on TF  Rojas:in place for critical illness  Access PIV x 2 and a PICC    Disposition Plan   Expected discharge in tbd days to tbd once above sorted through.     Entered: Hayley Duke 12/24/2021, 3:56 PM         We are operating under sub optimal conditions in the setting of a world wide pandemic, hospitals are running at full capacity with limited ICU bed availability. We are providing the best possible patient care with limited resources.      Discussed with danette Cortez by phone, recommended he come in for a  "compassionate visit       Interval History (Subjective):      NE needs increasing, bun/creat worsening.  Increasing respiratory support.  All paint a worsening prognosis.                   Physical Exam:      Last Vital Signs:  /45   Pulse 93   Temp 99  F (37.2  C) (Axillary)   Resp 23   Ht 1.549 m (5' 1\")   Wt 85.4 kg (188 lb 4.4 oz)   SpO2 90%   BMI 35.57 kg/m      I/O: net negative 1.1 liter.  UO drifting down   UO 1400 yesterday     Tele NSR    Puffy all over but not true edema lungs coarse fair air movement rrr no mrg belly obese but otherwise s/nt/nd skin warm and dry no cyanosis or clubbing intubated and sedated           Medications:      All current medications were reviewed with changes reflected in problem list.         Data:      All new lab and imaging data was reviewed.   Labs:  Recent Labs   Lab 12/24/21  1213 12/24/21  0806 12/24/21  0525   NA  --   --  140   POTASSIUM  --   --  4.7   CHLORIDE  --   --  111*   CO2  --   --  23   ANIONGAP  --   --  6   *   < > 94   BUN  --   --  72*   CR  --   --  1.42*   GFRESTIMATED  --   --  38*   SEDA  --   --  8.0*    < > = values in this interval not displayed.     Recent Labs   Lab 12/24/21  0525   WBC 9.8   HGB 9.6*   HCT 32.3*   *        Recent Labs   Lab 12/24/21  1213 12/24/21  0806 12/24/21  0525 12/24/21  0351 12/24/21  0012   * 90 94 96 127*     Recent Labs   Lab 12/20/21  0428   AST 31   ALT 15   ALKPHOS 53   BILITOTAL 0.2      Imaging:   "

## 2021-12-24 NOTE — PROGRESS NOTES
[facility] ICU VENTILATOR RESPIRATORY NOTE     Date of Admission: 12/17/2021  Date of Intubation (most recent): 12/19/2021  Reason for Mechanical Ventilation: Resp Failure   Number of Days on Mechanical Ventilation: 6  Met Criteria for Pressure Support Trial: NO  Reason for No Pressure Support Trial: Patient is still on PEEP+16 cm H2O, FiO2 100% , and Full strength VELETRI    Recent Labs   Lab 12/22/21  0756 12/21/21  0502 12/20/21  0559 12/20/21  0040   PH 7.34* 7.32* 7.32* 7.30*   PCO2 45 48* 47* 49*   PO2 97 77* 81 122*   HCO3 24 25 24 24   O2PER 85 75 80 100     Ventilation Mode: PCV PLUS  (Pressure Control Ventilation Plus (added secondary Mode)  FiO2 (%): 100 %  Rate Set (breaths/minute): 20 breaths/min  PEEP (cm H2O): 16 cmH2O  Oxygen Concentration (%): 100 %  Peak Inspiratory Pressure (cm H2O) ([employer] Jennifer): 32  Resp: 20    RT to follow     RT Brock on 12/24/2021 at 5:32 AM

## 2021-12-24 NOTE — PLAN OF CARE
ICU End of Shift Summary.  For vital signs and complete assessments, please see documentation flowsheets.     Neuro: RASS -4.  Cardiac: SB-SR, still requiring levo to maintain maps>65  Resp: Vent supported, lungs coarse. Small ett/oral secretions  GI: abd soft, BS hypo, given scheduled lactulose w/ no results  : baxter w/ clear yellow output  Skin: see flowsheets  Lines: keofeed R nare, Picc, PIVx2, baxter  Drips: levo, fent, prop    Major Shift Events:   Episode of desaturation/ hypotension w/ chg bath, required bagging and increase of levo    Plan (Upcoming Events): continue supportive cares  Discharge/Transfer Needs: TBD

## 2021-12-24 NOTE — CONSULTS
[facility] Nurse Inpatient Wound Assessment   Reason for consultation: Evaluate and treat: Right knee wound    Assessment  Right knee wounds due to abrasion  Status: initial assessment  Small area of dry drainage  Treatment Plan  Right knee wound: Every 3 days   1. Cleanse with wound cleanser and dry  2. Apply Mepilex 4x4   Orders Written  Recommended provider order: None, at this time  WO Nurse follow-up plan:weekly  Nursing to notify the Provider(s) and re-consult the WO Nurse if wound(s) deteriorates or new skin concern.    Patient History  According to provider note(s):  Sunita Barajas is a 76 year old female with a history of htn/hlp/hypothyroidism and depression. She was admitted on 12/8/2021 with hypoxic respiratory failure from COVID 19 pna    Objective Data  Containment of urine/stool: Indwelling catheter    Active Diet Order  Orders Placed This Encounter      NPO for Medical/Clinical Reasons Except for: Meds      Output:   I/O last 3 completed shifts:  In: 1894.48 [I.V.:574.48; NG/GT:660]  Out: 1455 [Urine:1455]    Risk Assessment:   Sensory Perception: 1-->completely limited  Moisture: 3-->occasionally moist  Activity: 1-->bedfast  Mobility: 1-->completely immobile  Nutrition: 3-->adequate  Friction and Shear: 2-->potential problem  Adarsh Score: 11                          Labs:   Recent Labs   Lab 12/24/21  0525 12/21/21  0424 12/20/21  0428   ALBUMIN  --   --  1.5*   PREALB  --   --  7*   HGB 9.6*   < > 10.8*   INR  --   --  1.22*   WBC 9.8   < > 15.6*   CRP 91.3*  --   --     < > = values in this interval not displayed.       Physical Exam  Areas of skin assessed: focused Right knee    Wound Location:  Right knee  Date of last photo NA  Wound History: Abrasion from falling prior to admission.  Wound Base: 100 % dry drainage     Palpation of the wound bed: normal      Drainage: none     Description of drainage: none     Measurements (length x width x depth, in cm) 1  x 1.5 cm      Tunneling N/A      Undermining N/A  Periwound skin: intact      Color: normal and consistent with surrounding tissue      Temperature: normal   Odor: none  Pain: unable to assess due to  sedation     Interventions  Visual inspection and assessment completed   Wound Care Rationale Provide protection   Wound Care: done per plan of care  Supplies: at bedside  Current off-loading measures: Pillows under calves and Pillows  Current support surface: Standard  Low air loss mattress with pulsation   Education provided to: plan of care  Discussed plan of care with Patient and Nurse    Abran Peraza RN CWOCN

## 2021-12-24 NOTE — PROGRESS NOTES
[facility] Intensive Care Progress Note    Problem List  Acute hypoxic respiratory failure/ARDS related to  COVID-19 infection      Date of Service (when I saw the patient): 12/24/2021     Assessment & Plan   Sunita Barajas is a 76 year old female who was admitted on 12/8/2021. She was intubated late at night 12/19    Neurology:  sedated:   RASS goal -4 / -5  Sedatives: fentanyl, propofol  Paralytic: no  -    Cardiovascular:  Norepinephrine low dose to keep map above 65:   - low dose      Pulmonary:        Acute hypoxic respiratory failure due to Covid: Intubated late on 12/19 after 5 days on BiPAP and several more days on high flow nasal cannula  CXR:  today no change  Prone positioning: did not help  Inhaled epoprostenol  - continues on dexamethasone 12mg daily  - PF is 58 today, not proned for several days    Ventilation Mode: PCV PLUS  (Pressure Control Ventilation Plus (added secondary Mode)  FiO2 (%): 100 %  Rate Set (breaths/minute): 20 breaths/min  PEEP (cm H2O): 16 cmH2O  Oxygen Concentration (%): 100 %  Peak Inspiratory Pressure (cm H2O) (Drager Jennifer): 32  Resp: 23    Renal  Adequate renal function: Continue monitoring urine output and electrolytes  - adequate urine output, Improved after irrigation of Rojas  - Creat continues to increase several days, no     ID:         COVID-19 pneumonia: Symptoms started on 12/1, tested positive on 12/8  positive; last test on 12/8.  Hospitalization:  steroids, baricitinib + remdesivir  Ventilatory support: 6 days intubated  -    GI  OG tube in stomach: will place post-pyloric when able  -    Nutrition  Initiate enteral nutrition: okay to feed gastric when supine  - continue TPN    Endocrine:  Hyperglycemia: Subcutaneous insulin correctional  -    Heme/Onc:  Mild leukocytosis and anemia: Related to acute illness  -    DVT Prophylaxis: Enoxaparin (Lovenox) SQ  GI Prophylaxis: PPI    Restraints: Restraints for medical healing  needed: YES    Family update by me today: Yes  son Diego Cantrell MD Nikki    Time Spent on this Encounter   Billing:  I spent 30 minutes, exclusive of procedures and teaching, bedside and on the inpatient unit today managing the critical care of Sunita Barajas in relation to the issues listed in this note.    Main Plans for Today   Prone position  Arterial line    Interval History   Worse oxygenation, desats this morning to 80%    Patient was intubated 12/19 night after several days of high amounts of noninvasive support.  She developed symptoms around 12/1 including cough and diarrhea.  Presented to [facility] emergency department on 12/8.    Physical Exam   Temp: 98.7  F (37.1  C) Temp src: Axillary Temp  Min: 98  F (36.7  C)  Max: 99  F (37.2  C) BP: (!) 108/37 Pulse: 82   Resp: 23 SpO2: (!) 83 % O2 Device: Mechanical Ventilator    Vitals:    12/22/21 0445 12/23/21 0430 12/24/21 0530   Weight: 84.4 kg (186 lb 1.1 oz) 86.6 kg (190 lb 14.7 oz) 85.4 kg (188 lb 4.4 oz)     I/O last 3 completed shifts:  In: 1894.48 [I.V.:574.48; NG/GT:660]  Out: 1455 [Urine:1455]    Current Vitals:Temp:  [98  F (36.7  C)-99  F (37.2  C)] 98.7  F (37.1  C)  Pulse:  [58-87] 82  Resp:  [12-26] 23  BP: ()/(31-71) 108/37  FiO2 (%):  [90 %-100 %] 100 %  SpO2:  [69 %-97 %] 83 %   Sedated, unresponsive  PERRL and conjugate gaze  Orally intubated  Lungs clear  H-RR w/o murmur  Abdomen-soft, non tender, non distended  Extremities-warm and no edema  Lines: No erythema or discharge at entry site for PICC Line  Current lines are required for patient management    Medications     dextrose       epoprostenol (VELETRI) 20 mcg/mL in sterile water inhalation solution 20 ng/kg/min (12/24/21 0455)     fentaNYL 150 mcg/hr (12/24/21 0700)     propofol (DIPRIVAN) infusion 35 mcg/kg/min (12/24/21 0845)    And     - MEDICATION INSTRUCTIONS -       norepinephrine 0.1 mcg/kg/min (12/24/21 0930)       [Held by provider] aspirin  81 mg Oral Daily      dexamethasone  12 mg Intravenous Daily     enoxaparin ANTICOAGULANT  40 mg Subcutaneous Q12H     [Held by provider] FLUoxetine  60 mg Oral Daily     insulin aspart  1-6 Units Subcutaneous Q4H     [Held by provider] ipratropium-albuterol  2 puff Inhalation 4x Daily     lactated ringers  1,000 mL Intravenous Once     lactulose  20 g Oral or Feeding Tube BID     levothyroxine  75 mcg Oral or Feeding Tube Daily     lidocaine (viscous)  5 mL Topical Once     multivitamins w/minerals  15 mL Per Feeding Tube Daily     pantoprazole  40 mg Per Feeding Tube QAM AC    Or     pantoprazole (PROTONIX) IV  40 mg Intravenous QAM AC     protein modular  1 packet Per Feeding Tube TID     [Held by provider] simvastatin  40 mg Oral At Bedtime     sodium chloride (PF)  10-40 mL Intracatheter Q7 Days     sodium chloride (PF)  3 mL Intracatheter Q8H       Data   Recent Labs   Lab 12/24/21  0806 12/24/21  0525 12/24/21  0351 12/23/21  0801 12/23/21  0443 12/22/21  0752 12/22/21  0452 12/21/21  0429 12/21/21  0424 12/20/21  0435 12/20/21  0428   WBC  --  9.8  --   --   --   --  10.9  --  13.4*  --  15.6*   HGB  --  9.6*  --   --   --   --  9.8*  --  11.2*  --  10.8*   MCV  --  102*  --   --   --   --  102*  --  100  --  99   PLT  --  166  --   --   --   --  190  --  223  --  213   INR  --   --   --   --   --   --   --   --   --   --  1.22*   NA  --  140  --   --   --   --  140  --  142  --  141   POTASSIUM  --  4.7  --   --  4.4  --  4.5  --  4.8  --  4.3   CHLORIDE  --  111*  --   --   --   --  110*  --  112*  --  111*   CO2  --  23  --   --   --   --  25  --  25  --  24   BUN  --  72*  --   --   --   --  53*  --  46*  --  36*   CR  --  1.42*  --   --  1.16*  --  1.13*  --  0.92  --  0.79   ANIONGAP  --  6  --   --   --   --  5  --  5  --  6   SEDA  --  8.0*  --   --   --   --  7.8*  --  8.4*  --  8.0*   GLC 90 94 96   < >  --    < > 113*   < > 137*   < > 228*   ALBUMIN  --   --   --   --   --   --   --   --   --   --  1.5*   PROTTOTAL  --    --   --   --   --   --   --   --   --   --  5.8*   BILITOTAL  --   --   --   --   --   --   --   --   --   --  0.2   ALKPHOS  --   --   --   --   --   --   --   --   --   --  53   ALT  --   --   --   --   --   --   --   --   --   --  15   AST  --   --   --   --   --   --   --   --   --   --  31    < > = values in this interval not displayed.     Recent Results (from the past 24 hour(s))   XR Chest Port 1 View    Narrative    CHEST ONE VIEW PORTABLE   12/24/2021 9:38 AM     HISTORY: Hypoxia.    COMPARISON: 12/20/2021      Impression    IMPRESSION: Tip of the endotracheal tube is 4.9 cm above the conor.  Feeding tube extends into the abdomen and appears to be postpyloric.  Right-sided PICC line terminates in the proximal SVC. Bilateral  interstitial and groundglass opacities slightly worsened compared to  prior. No pneumothorax or pleural effusion.

## 2021-12-25 PROBLEM — N17.0 ACUTE KIDNEY FAILURE WITH TUBULAR NECROSIS (H): Status: ACTIVE | Noted: 2021-01-01

## 2021-12-25 NOTE — PROGRESS NOTES
DATA:    VENT DAY#  7    CURRENT SETTINGS:  VENT SETTINGS   Ventilation Mode: CMV/AC  (Continuous Mandatory Ventilation/ Assist Control)  FiO2 (%): 90 %  Rate Set (breaths/minute): 24 breaths/min  Tidal Volume Set (mL): 380 mL  PEEP (cm H2O): 16 cmH2O  Oxygen Concentration (%): 90 %  Peak Inspiratory Pressure (cm H2O) (Drager Jennifer): 32  Resp: 24            FIO2:   100    SBT: n/a  SECRETIONS:  Moderate, yellow/creamy, thick, purulent  02 SATS:  89-96%      Respiratory Medications: continuous FS veletri     NOTE / PLAN: pt supine at approx 1045, prone at 1650.  Patient remained intubated and respiratory status stable throughout shift. Please see all documented settings and patient parameters in flow sheets. RT will continue to monitor and provide support as needed.   Doretha Lux, LRT, BSRT-RRT

## 2021-12-25 NOTE — PROGRESS NOTES
[facility] Intensive Care Progress Note    Problem List  Acute hypoxic respiratory failure/ARDS related to  COVID-19 infection  Acute kidney injury    Date of Service (when I saw the patient): 12/25/2021     Assessment & Plan   Sunita Barajas is a 76 year old female who was admitted on 12/8/2021. She was intubated late at night 12/19    Neurology:  sedated:   RASS goal -4 / -5  Sedatives: fentanyl, propofol  Paralytic: no  -    Cardiovascular:  Norepinephrine low dose to keep map above 65:   - low dose      Pulmonary:        Acute hypoxic respiratory failure due to Covid: Intubated late on 12/19 after 5 days on BiPAP and several more days on high flow nasal cannula  CXR:  today no change  Prone positioning: seemed to help yesterday  Inhaled epoprostenol  - continues on dexamethasone 12mg daily, has been on steroids since 12/8      Ventilation Mode: CMV/AC  (Continuous Mandatory Ventilation/ Assist Control)  FiO2 (%): 90 %  Rate Set (breaths/minute): 24 breaths/min  Tidal Volume Set (mL): 380 mL  PEEP (cm H2O): 16 cmH2O  Oxygen Concentration (%): 90 %  Peak Inspiratory Pressure (cm H2O) (Drager Jennifer): 32  Resp: 24    Renal  Adequate renal function: Continue monitoring urine output and electrolytes  - adequate urine output, Improved after irrigation of Rojas  - Creat continues to increase several days, decreased urine output yesterday  - albumin today  - check FeNa    ID:         COVID-19 pneumonia: Symptoms started on 12/1, tested positive on 12/8  positive; last test on 12/8.  Hospitalization:  steroids, baricitinib + remdesivir  Ventilatory support: 7 days intubated  -Gram stain GPC in clusters  Empiric cefazolin - most VAP has been MSSA and given renal injury avoid vanc     GI  OG tube in stomach: will place post-pyloric when able  -    Nutrition  Initiate enteral nutrition: okay to feed gastric when supine  - continue TPN    Endocrine:  Hyperglycemia: Subcutaneous  insulin correctional  -    Heme/Onc:  Mild leukocytosis and anemia: Related to acute illness  -    DVT Prophylaxis: Enoxaparin (Lovenox) SQ  GI Prophylaxis: PPI    Restraints: Restraints for medical healing needed: YES    Family update by me today: No     Tamia Jordan MD    Time Spent on this Encounter   Billing:  I spent 30 minutes, exclusive of procedures and teaching, bedside and on the inpatient unit today managing the critical care of Sunita Barajas in relation to the issues listed in this note.    Main Plans for Today   Prone position 16 - 18 hours/day  Add antibiotics      Interval History   Arterial line attempt unsuccessful yesterday (left radial and brachial) - she has been prone overnight with improved oxygenation    Patient was intubated 12/19 night after several days of high amounts of noninvasive support.  She developed symptoms around 12/1 including cough and diarrhea.  Presented to [facility] emergency department on 12/8.    Physical Exam   Temp: 98  F (36.7  C) Temp src: Temporal Temp  Min: 96.9  F (36.1  C)  Max: 99.5  F (37.5  C) BP: 133/40 Pulse: 74   Resp: 24 SpO2: 99 % O2 Device: Mechanical Ventilator    Vitals:    12/23/21 0430 12/24/21 0530 12/25/21 0215   Weight: 86.6 kg (190 lb 14.7 oz) 85.4 kg (188 lb 4.4 oz) 85.6 kg (188 lb 11.4 oz)     I/O last 3 completed shifts:  In: 3849 [I.V.:1884; NG/GT:1275]  Out: 1635 [Urine:1635]    Current Vitals:Temp:  [96.9  F (36.1  C)-99.5  F (37.5  C)] 98  F (36.7  C)  Pulse:  [] 74  Resp:  [6-24] 24  BP: ()/(35-58) 133/40  FiO2 (%):  [90 %-100 %] 90 %  SpO2:  [82 %-100 %] 99 %   Sedated, unresponsive  PERRL and conjugate gaze  Orally intubated  Lungs clear  H-RR w/o murmur  Abdomen-soft, non tender, non distended  Extremities-warm and no edema  Lines: No erythema or discharge at entry site for PICC Line  Current lines are required for patient management    Medications     dextrose       epoprostenol (VELETRI) 20 mcg/mL in sterile water inhalation  solution 20 ng/kg/min (12/25/21 0552)     fentaNYL 200 mcg/hr (12/25/21 0600)     propofol (DIPRIVAN) infusion 75 mcg/kg/min (12/25/21 0850)    And     - MEDICATION INSTRUCTIONS -       norepinephrine 0.15 mcg/kg/min (12/25/21 0851)       [Held by provider] aspirin  81 mg Oral Daily     dexamethasone  12 mg Intravenous Daily     docusate  100 mg Oral or Feeding Tube BID    And     sennosides  15 mL Oral or Feeding Tube BID     enoxaparin ANTICOAGULANT  40 mg Subcutaneous Q12H     [Held by provider] FLUoxetine  60 mg Oral Daily     insulin aspart  1-6 Units Subcutaneous Q4H     [Held by provider] ipratropium-albuterol  2 puff Inhalation 4x Daily     lactated ringers  1,000 mL Intravenous Once     lactulose  20 g Oral or Feeding Tube BID     levothyroxine  75 mcg Oral or Feeding Tube Daily     lidocaine (viscous)  5 mL Topical Once     multivitamins w/minerals  15 mL Per Feeding Tube Daily     pantoprazole  40 mg Per Feeding Tube QAM AC    Or     pantoprazole (PROTONIX) IV  40 mg Intravenous QAM AC     protein modular  1 packet Per Feeding Tube TID     [Held by provider] simvastatin  40 mg Oral At Bedtime     sodium chloride (PF)  10-40 mL Intracatheter Q7 Days     sodium chloride (PF)  3 mL Intracatheter Q8H       Data   Recent Labs   Lab 12/25/21  0816 12/25/21  0422 12/25/21  0419 12/24/21  0806 12/24/21  0525 12/23/21  0801 12/23/21  0443 12/22/21  0752 12/22/21  0452 12/20/21  0435 12/20/21  0428   WBC  --   --  13.7*  --  9.8  --   --   --  10.9   < > 15.6*   HGB  --   --  10.2*  --  9.6*  --   --   --  9.8*   < > 10.8*   MCV  --   --  105*  --  102*  --   --   --  102*   < > 99   PLT  --   --  183  --  166  --   --   --  190   < > 213   INR  --   --   --   --   --   --   --   --   --   --  1.22*   NA  --   --  141  --  140  --   --   --  140   < > 141   POTASSIUM  --   --  4.9  --  4.7  --  4.4  --  4.5   < > 4.3   CHLORIDE  --   --  111*  --  111*  --   --   --  110*   < > 111*   CO2  --   --  24  --  23  --    --   --  25   < > 24   BUN  --   --  75*  --  72*  --   --   --  53*   < > 36*   CR  --   --  1.67*  --  1.42*  --  1.16*  --  1.13*   < > 0.79   ANIONGAP  --   --  6  --  6  --   --   --  5   < > 6   SEDA  --   --  7.8*  --  8.0*  --   --   --  7.8*   < > 8.0*   * 157* 164*   < > 94   < >  --    < > 113*   < > 228*   ALBUMIN  --   --  1.5*  --   --   --   --   --   --   --  1.5*   PROTTOTAL  --   --  6.1*  --   --   --   --   --   --   --  5.8*   BILITOTAL  --   --  0.3  --   --   --   --   --   --   --  0.2   ALKPHOS  --   --  63  --   --   --   --   --   --   --  53   ALT  --   --  21  --   --   --   --   --   --   --  15   AST  --   --  20  --   --   --   --   --   --   --  31    < > = values in this interval not displayed.     Recent Results (from the past 24 hour(s))   XR Chest Port 1 View    Narrative    CHEST ONE VIEW PORTABLE   12/24/2021 9:38 AM     HISTORY: Hypoxia.    COMPARISON: 12/20/2021      Impression    IMPRESSION: Tip of the endotracheal tube is 4.9 cm above the conor.  Feeding tube extends into the abdomen and appears to be postpyloric.  Right-sided PICC line terminates in the proximal SVC. Bilateral  interstitial and groundglass opacities slightly worsened compared to  prior. No pneumothorax or pleural effusion.    BALA REYNA MD         SYSTEM ID:  [id number]

## 2021-12-25 NOTE — PROGRESS NOTES
[facility]  Hospitalist Progress Note  Hayley Duke MD 12/25/2021    Reason for Stay (Diagnosis): Hypoxic respiratory failure         Assessment and Plan:      Summary of Stay: Sunita Barajas is a 76 year old female with a history of htn/hlp/hypothyroidism and depression. She was admitted on 12/8/2021 with hypoxic respiratory failure from COVID 19 pna    She is not vaccinated.     She required high level O2 on presentation to the ED.  Labs notable for VALE with creat 1.25, crp 154.  CT scan consistent with bilateral covid pna but negative for PE.     Her oxygen requirements continued to increase and ultimately she was intubated on 12/19.  Hospitalization has been complicated by ongoing respiratory failure/persistent hypotension necessitating pressor support    Over the course of 12.24 had rough day, ultimately proned with some improvement in her respiratory status.  Given her tenuous state at that time I did have her son Diego and his wife come in for a compassionate visit.     She remains tenuous.  She was supined with drop in sats to the mid and low 80's.  Had e/o emesis of gastric contents.  Given ondansetron.  Has continued to vomit so am evaluating with AXR and OG  Has NJ for feeding and free water flushes.  No BM for about a week    Renal function is also declining which is another poor prognostic sign.     Was having increasing secretions, and now sputum culture with 3 +GPC      Problem List:   Acute hypoxic respiratory failure  COVID 19 pna  pneumomediastinum  Her sx began about a week pta with cough and generalized weakness.  She fell 2-3 days before presenting to our ER and had just been laying on the floor since.  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  On presentation to the ED she required 10 L FM to get her O2 sats into the 90's.  -Not vaccinated  -Date of sx onset late November  is best guess but did test negative at some point during that time  -Tested positive 12/8/2021  -had progressed to full bipap support for several days before getting intubated on 12/19  -s/p 5 days of remdesivir, completed on 12/12  -s/p 10 days dex at 6 mg, now on dex 12 mg started on 12/20  -baricitinib started on 12/13, on day 11/14  -prone positioning apparently did not help  -enox 40 mg bid  -CT scan on admission with bilateral confluent GGO consistent with COVID pna, No PE.  Repeat CT scan 12/16 with worsening bilateral GGO, still no PE, but notable for extensive pneumomediastinum extending into the neck bilaterally as well as the chest wall-this appears to be improving.   -MV with PEEP 16, FiO2 90 %,satting mid 90 % range while proned.  Dropped to low 80's supine position.  Will prone again this afternoon. Discussed with son Diego by phone.  Her course remains tenuous.  So I have cleared a compassionate visit for her other son who has just flown in from [place] today    VAP  Sputum gram stain with 3 + GPC, given VALE and that most VAPs have been MSSA will hold off on vanco and use cefazolin instead, await further ID and S     Septic shock from viral pna/ARDS and ESBL UTI  -continues on norepi,   -completed 6 day course of meropenem on 12/16    VALE/Mild rhabdo with /urinary retention  Resolved and now recurred 2/2 ongoing critical illness concerning for evolving MSOF   Baseline creat 1.1-1.2, 1.54 on admit had returned to baseline but then jumped to 1.4 and is continuing to decline  -Rojas placed 12/15  -no IVF given tenuous respiratory status  -increased Free water to 250 q 4  -single dose of albumin today to improve renal perfusion   -UO picked up over the course of yesterday     Recurrent Emesis of gastric contents/no BM  -ck AXR, OG to LIS  _hold TF for the evening    Hyperglycemia hgb A1c 5.6 % so likely related to steroids/TF  -cont ISS, minimal requirements at the moment    htn   Home regimen is  "lisinopril 20 mg every day  -on hold in light of VALE/septic shock and need for pressor support    hlp  -back on home simva 40 mg qd    Hypothyroidism  -back on home replacement with levothyroxine     Depression   -back on home fluoxetine 60 mg qd    FEN net negative 3 L weights are stable.  On TF    Incidentally noted ovarian cysts on admission CT scan, increased in size from prior study  -nonurgent f/u US       DVT Prophylaxis: Enoxaparin (Lovenox) SQ  Code Status: Full Code  Functional Status:    Diet:NPO on TF  Rojas:in place for critical illness  Access PIV x 2 and a PICC    Disposition Plan   Expected discharge in tbd days to tbd once above sorted through.     Entered: Hayley Duke 12/25/2021, 2:35 PM         We are operating under sub optimal conditions in the setting of a world wide pandemic, hospitals are running at full capacity with limited ICU bed availability. We are providing the best possible patient care with limited resources.      Discussed with danette Cortez by phone      Interval History (Subjective):      NE needs increasing, bun/creat worsening.  Increasing respiratory support.  All paint a worsening prognosis.                   Physical Exam:      Last Vital Signs:  /50   Pulse 85   Temp 99.5  F (37.5  C) (Axillary)   Resp 28   Ht 1.549 m (5' 1\")   Wt 85.6 kg (188 lb 11.4 oz)   SpO2 96%   BMI 35.66 kg/m      I/O: net negative 1.6 liter.    UO 1600 yesterday     Tele NSR    Puffy all over but not true edema lungs coarse fair air movement rrr no mrg belly obese but otherwise s/nt/nd skin warm and dry no cyanosis or clubbing intubated and sedated           Medications:      All current medications were reviewed with changes reflected in problem list.         Data:      All new lab and imaging data was reviewed.   Labs:  Recent Labs   Lab 12/25/21  1219 12/25/21  0422 12/25/21  0419   NA  --   --  141   POTASSIUM  --   --  4.9   CHLORIDE  --   --  111*   CO2  --   --  24   ANIONGAP  --   " --  6   *   < > 164*   BUN  --   --  75*   CR  --   --  1.67*   GFRESTIMATED  --   --  31*   SEDA  --   --  7.8*    < > = values in this interval not displayed.     Recent Labs   Lab 12/25/21 0419   WBC 13.7*   HGB 10.2*   HCT 34.8*   *        Recent Labs   Lab 12/25/21  1219 12/25/21  0816 12/25/21  0422 12/25/21  0419 12/25/21  0020   * 123* 157* 164* 176*     Recent Labs   Lab 12/25/21  0419 12/20/21  0428   AST 20 31   ALT 21 15   ALKPHOS 63 53   BILITOTAL 0.3 0.2      Imaging:

## 2021-12-25 NOTE — PLAN OF CARE
ICU End of Shift Summary.  For vital signs and complete assessments, please see documentation flowsheets.     Pertinent assessments: RASS -5. Afebrile. Tele SR. BP titrated to maintain MAP >65. Continues on vent support. FIO2 weaned as tolerated. Mod amount yellow secretions. Keofeed in place, tolerating tube feed. Rojas in place with adequate urine output. No BM. Son updated at 2030.  Major Shift Events: levo titrated up/down  Plan (Upcoming Events): Wean FIO2 as able  Discharge/Transfer Needs: ICU cares continue    Bedside Shift Report Completed : Y  Bedside Safety Check Completed: Y

## 2021-12-25 NOTE — PROGRESS NOTES
CLINICAL NUTRITION SERVICES - REASSESSMENT NOTE    Recommendations Ordered by Registered Dietitian (RD):     Vital HP at 40 mL/hr    Prosource TID    Future/Additional Recommendations:     Fluid flush 250 mL q4 hours per MD    Bowel regimen    Total goal rate pending tolerance, kcal from propofol    Malnutrition :  % Intake: </= 50% for >/= 5 days (severe) -- improving with TF initiation  % Weight Loss: > 2% in 1 week (severe)  Subcutaneous Fat Loss: Unable to assess  Muscle Loss: Unable to assess  Fluid Accumulation/Edema: None noted    Malnutrition Diagnosis: Severe malnutrition in the context of acute illness     EVALUATION OF PROGRESS TOWARD GOALS/NEW FINDINGS   Progress towards goals will be monitored and evaluated per protocol and Practice Guidelines    Patient remains intubated, TF reliant:    Enteral Nutrition - Initiate- Vital HP @ goal rate of 30 ml/hr (720 ml) provides 720 kcal, 63 g protein, 80 g CHO, 0 g fiber, 602 ml free H2O + Prosource- 3 pkts/day- 120 kcal/33 g protein (96 g protein daily)    Free water flushes of 250 ml q 4 hrs per MD  Total EN volume received, 5 day average: 520 mL, 72% of prescribed volume --> average of 640 kcal, 78 g protein when modulars accounted for. Ongoing high propofol infusion contributing kcal from lipids (see below) -- aiming to meet ~80% of energy needs first week of critical illness      Last BM:  (smear) --> bowel regimen ongoing, BM  and     Wounds/WOCN:  R knee skin abrasion    Fluid: +1-2 BLE and BUE edema    Weight: 85 kg -  down from 90.7 kg admit (fluctuations in last week with fluid shifts)    Palliative: following for advanced directive    Adarsh nutrition score: 3; total score: 11    I/O last 3 completed shifts:    In: 3849 [I.V.:1884; NG/GT:1275]    Out: 1635 [Urine:1635]    OG to LIS placed . Moderate stool on xray.      Temp (24hrs), Av.5  F (36.9  C), Min:96.9  F (36.1  C), Max:99.5  F (37.5  C)     Ventilation Mode:  CMV/AC  (Continuous Mandatory Ventilation/ Assist Control)  FiO2 (%): 90 %  Rate Set (breaths/minute): 24 breaths/min  Tidal Volume Set (mL): 380 mL  PEEP (cm H2O): 16 cmH2O  Oxygen Concentration (%): 90 %  Peak Inspiratory Pressure (cm H2O) (Drager Jennifer): 32  Resp: 28    Labs:     Electrolytes  Potassium (mmol/L)   Date Value   12/25/2021 4.9   12/24/2021 4.7   12/23/2021 4.4     Phosphorus (mg/dL)   Date Value   12/24/2021 4.3   12/23/2021 3.8   12/22/2021 3.4   12/21/2021 3.9   12/20/2021 2.8    Blood Glucose  Glucose (mg/dL)   Date Value   12/25/2021 164 (H)   12/24/2021 94   12/22/2021 113 (H)   12/21/2021 137 (H)   12/20/2021 228 (H)     GLUCOSE BY METER POCT (mg/dL)   Date Value   12/25/2021 116 (H)   12/25/2021 123 (H)   12/25/2021 157 (H)   12/25/2021 176 (H)   12/24/2021 192 (H)     Hemoglobin A1C (%)   Date Value   12/16/2021 5.6    Inflammatory Markers  CRP Inflammation (mg/L)   Date Value   12/24/2021 91.3 (H)   12/19/2021 148.0 (H)   12/17/2021 81.0 (H)     WBC Count (10e3/uL)   Date Value   12/25/2021 13.7 (H)   12/24/2021 9.8   12/22/2021 10.9     Albumin (g/dL)   Date Value   12/25/2021 1.5 (L)   12/20/2021 1.5 (L)   12/12/2021 2.2 (L)      Magnesium (mg/dL)   Date Value   12/24/2021 2.5 (H)   12/23/2021 2.5 (H)   12/22/2021 2.3     Sodium (mmol/L)   Date Value   12/25/2021 141   12/24/2021 140   12/22/2021 140    Renal  Urea Nitrogen (mg/dL)   Date Value   12/25/2021 75 (H)   12/24/2021 72 (H)   12/22/2021 53 (H)     Creatinine (mg/dL)   Date Value   12/25/2021 1.67 (H)   12/24/2021 1.42 (H)   12/23/2021 1.16 (H)     Additional  Triglycerides (mg/dL)   Date Value   12/24/2021 105   12/22/2021 128   12/18/2021 168 (H)     Ketones Urine (mg/dL)   Date Value   12/18/2021 Negative      Meds:       [Held by provider] aspirin  81 mg Oral Daily     ceFAZolin  1 g Intravenous Q8H     dexamethasone  12 mg Intravenous Daily     docusate  100 mg Oral or Feeding Tube BID    And     sennosides  15 mL Oral or  Feeding Tube BID     enoxaparin ANTICOAGULANT  40 mg Subcutaneous Q12H     [Held by provider] FLUoxetine  60 mg Oral Daily     insulin aspart  1-6 Units Subcutaneous Q4H     [Held by provider] ipratropium-albuterol  2 puff Inhalation 4x Daily     lactated ringers  1,000 mL Intravenous Once     lactulose  20 g Oral or Feeding Tube BID     levothyroxine  75 mcg Oral or Feeding Tube Daily     lidocaine (viscous)  5 mL Topical Once     multivitamins w/minerals  15 mL Per Feeding Tube Daily     pantoprazole  40 mg Per Feeding Tube QAM AC    Or     pantoprazole (PROTONIX) IV  40 mg Intravenous QAM AC     protein modular  1 packet Per Feeding Tube TID     [Held by provider] simvastatin  40 mg Oral At Bedtime     sodium chloride (PF)  10-40 mL Intracatheter Q7 Days     sodium chloride (PF)  3 mL Intracatheter Q8H        dextrose       epoprostenol (VELETRI) 20 mcg/mL in sterile water inhalation solution 20 ng/kg/min (12/25/21 1254)     fentaNYL 200 mcg/hr (12/25/21 1222)     propofol (DIPRIVAN) infusion 75 mcg/kg/min (12/25/21 1132)    And     - MEDICATION INSTRUCTIONS -       norepinephrine 0.09 mcg/kg/min (12/25/21 1050)    Propofol gtt at 36.5 mL/hr to provide 964 kcal daily from lipids    ASSESSED NUTRITION NEEDS (PER APPROVED PRACTICE GUIDELINES; DW: 80 kg):  Estimated Energy Needs: 1655+ kcal/day (PSU 2010)  Justification: Obese and Vented for ~1 week  Estimated Protein Needs: >95 grams protein/day (> 2 g/kg based on IBW of 47.7 kg)  Justification: Hypercatabolism with critical illness, monitoring of renal function  Estimated Fluid Needs: Per provider pending fluid status    Difficult to accurately assess estimated needs with prolonged critical clinical course and hypermetabolic nature of COVID19 infection without access to metabolic cart data    Previous Goals:   Tube feeding + propofol to meet % of estimated needs  Evaluation: In progress    Previous Nutrition Diagnosis:   Inadequate oral intake related to  intubation as evidenced by need for nutrition support to meet needs    Evaluation: Ongoing, updated below     CURRENT NUTRITION DIAGNOSIS  Inadequate protein intake related to slow TF advancement, stoppages as evidenced by meeting average ~85% of protein needs in last week    INTERVENTIONS  Recommendations / Nutrition Prescription  Updated TF regimen to better match estimated needs:  Type of Feeding Tube: ND (12/20, confirmed 12/25)  Enteral Frequency:  Continuous  Enteral Regimen: Vital High Protein at 40 mL/hr  Total Enteral Provisions: 960 mL provides 960 kcal, 84 g protein, 107 g CHO, 0 g fiber, and 806 mL free water daily. Meets <100% of DRI's --> continue Certavite   Prosource TID for an additional 33 g protein   Free Water Flush: per MD  Total goal rate pending actual infusion, tolerance, kcal from propofol    Implementation  EN Composition, EN Schedule, Multivitamin/Minerals and Feeding Tube Flush: Entered orders to reflect regimen outlined above  Collaboration and Referral of care: Discussed patient during interdisciplinary care rounds this morning    Goals  TF at goal rate to meet % of estimated needs      MONITORING AND EVALUATION:  Progress towards goals will be monitored and evaluated per protocol and Practice Guidelines      Emmy Rodríguez, MS, RDN-AP, LD, CNSC  Pager - 3rd floor/ICU: [phone]  Pager - All other floors: [phone]  Pager - Weekend/holiday: [phone]  Office: [phone]

## 2021-12-25 NOTE — PROGRESS NOTES
[facility] ICU VENTILATOR RESPIRATORY NOTE    Date of Admission: 12/17/2021    Date of Intubation (most recent):12/19/2021    Reason for Mechanical Ventilation: Respiratory Failure    Number of Days on Mechanical Ventilation:7    Met Criteria for Pressure Support Trial: No    Reason for No Pressure Support Trial: PEEP +16, Fio2 95%    ABG Results:   Recent Labs   Lab 12/24/21  0823 12/22/21  0756 12/21/21  0502 12/20/21  0559   PH 7.31* 7.34* 7.32* 7.32*   PCO2 49* 45 48* 47*   PO2 58* 97 77* 81   HCO3 24 24 25 24   O2PER 100 85 75 80     Ventilation Mode: CMV/AC  (Continuous Mandatory Ventilation/ Assist Control)  FiO2 (%): 90 %  Rate Set (breaths/minute): 20 breaths/min  Tidal Volume Set (mL): 380 mL  PEEP (cm H2O): 16 cmH2O  Oxygen Concentration (%): 95 %  Peak Inspiratory Pressure (cm H2O) (Drager Jennifer): 32  Resp: 20    Bs coarse. Suctioned for small yellow thick secretions. Veletri continues to run in line. Pt proned, Q2 head turns through out shift. Will continue to assess and monitor.    Mart Rocha RT on 12/25/2021 at 3:55 AM

## 2021-12-26 NOTE — PROGRESS NOTES
Attempted to draw an ABG from pt's left radial artery. Unable to successfully draw sample, attempted twice.    Recommended a follow up VBG.    Leeann Monreal RT on 12/26/2021 at 3:49 PM

## 2021-12-26 NOTE — PROGRESS NOTES
[facility] ICU VENTILATOR RESPIRATORY NOTE     Date of Admission: 12/17/2021     Date of Intubation (most recent):12/19/2021     Reason for Mechanical Ventilation: Respiratory Failure     Number of Days on Mechanical Ventilation:8     Met Criteria for Pressure Support Trial: No     Reason for No Pressure Support Trial: PEEP +16, Fio2 90%     ABG Results:   Recent Labs   Lab 12/25/21  0621 12/25/21  0419 12/24/21  0823 12/22/21  0756 12/21/21  0502 12/20/21  0559   PH  --   --  7.31* 7.34* 7.32* 7.32*   PCO2  --   --  49* 45 48* 47*   PO2  --   --  58* 97 77* 81   HCO3  --   --  24 24 25 24   O2PER 90 90 100 85 75 80     Ventilation Mode: CMV/AC  (Continuous Mandatory Ventilation/ Assist Control)  FiO2 (%): 90 %  Rate Set (breaths/minute): 24 breaths/min  Tidal Volume Set (mL): 380 mL  PEEP (cm H2O): 16 cmH2O  Oxygen Concentration (%): 90 %  Resp: 24    Bs diminished. Suctioned for yellow/thick secretions. Pt proned, head turns Q2. Veletri continues to run in line.    Mart Rocha, RT on 12/26/2021 at 3:51 AM

## 2021-12-26 NOTE — PLAN OF CARE
ICU End of Shift Summary.  For vital signs and complete assessments, please see documentation flowsheets.     Pertinent assessments: On full vent support. L/S diminished with bibasal crackles. Veletri continues inline.  Neurologically sedated at goal with prop and fent. Rhythm sinus. Vasopressors continue to maintain MAP >65. Had an emesis when supined, OG placed and Abd XR done. TF resumed at goal. BM++ on bowel regimen. Rojas with adequate uop. BG wnl. Re-pronated this evening. Family updated.   Major Shift Events: Supinated @ 1200 and pronated @ 1700. Levo titrated down. Albumin given. ABX initiated for GPC in sputum.  Emesis x 2, XR done and OG to LIS.   Plan (Upcoming Events): Continue ICU level cares  Discharge/Transfer Needs: TBD    Bedside Shift Report Completed : Y  Bedside Safety Check Completed:Y

## 2021-12-26 NOTE — PROGRESS NOTES
[facility]  Hospitalist Progress Note  Hayley Duke MD 12/26/2021    Reason for Stay (Diagnosis): Hypoxic respiratory failure         Assessment and Plan:      Summary of Stay: Sunita Barajas is a 76 year old female with a history of htn/hlp/hypothyroidism and depression. She was admitted on 12/8/2021 with hypoxic respiratory failure from COVID 19 pna    She is not vaccinated.     She required high level O2 on presentation to the ED.  Labs notable for VALE with creat 1.25, crp 154.  CT scan consistent with bilateral covid pna but negative for PE.     Her oxygen requirements continued to increase and ultimately she was intubated on 12/19.  Hospitalization has been complicated by ongoing respiratory failure/persistent hypotension necessitating pressor support    Over the course of 12.24 had rough day, ultimately proned with some improvement in her respiratory status.  Given her tenuous state at that time I did have her son Diego and his wife come in for a compassionate visit.     She remains tenuous.  She was supined with drop in sats to the mid and low 80's.  Had e/o emesis of gastric contents.  Given ondansetron.  Has continued to vomit so am evaluating with AXR and OG  Has NJ for feeding and free water flushes.  No BM for about a week    Renal function is also declining which is another poor prognostic sign.     Was having increasing secretions, and now sputum culture with 3 +GPC      Problem List:   Acute hypoxic respiratory failure  COVID 19 pna  pneumomediastinum  Her sx began about a week pta with cough and generalized weakness.  She fell 2-3 days before presenting to our ER and had just been laying on the floor since.  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  On presentation to the ED she required 10 L FM to get her O2 sats into the 90's.  -Not vaccinated  -Date of sx onset late November  is best guess but did test negative at some point during that time  -Tested positive 12/8/2021  -had progressed to full bipap support for several days before getting intubated on 12/19  -s/p 5 days of remdesivir, completed on 12/12  -s/p 10 days dex at 6 mg, now on dex 12 mg started on 12/20  -baricitinib started on 12/13-discontinued on 12/23  -initially prone positioning not helpful, but has definitely lead to some improvement over the past several day s  -enox 40 mg bid  -CT scan on admission with bilateral confluent GGO consistent with COVID pna, No PE.  Repeat CT scan 12/16 with worsening bilateral GGO, still no PE, but notable for extensive pneumomediastinum extending into the neck bilaterally as well as the chest wall-this appears to be improving.   -MV with PEEP 16, FiO2 80 %,satting mid 90 % range while proned.  Cont to prone daily- will re-evaluate tomorrow  -still with respiratory acidosis by VBG-recheck after proning    VAP  Sputum gram stain with 3 + GPC, given VALE and that most VAPs have been MSSA will hold off on vanco and use cefazolin instead, await further ID and S   -on day 2 /10 cefazolin, still awaiting sensitivities    Septic shock from viral pna/ARDS and ESBL UTI  -continues on norepi,   -completed 6 day course of meropenem on 12/16, now back on abx for VAP-see above    VALE/Mild rhabdo with /urinary retention  Resolved and now recurred 2/2 ongoing critical illness concerning for evolving MSOF   Baseline creat 1.1-1.2, had VALE on admission that resolved then recurred.  Appears tob e improving and is now down to 1.55 from 1.67.  UO is good. No culprit medications, CT with dye too long ago to be cause.  -Rojas placed 12/15  -no IVF given tenuous respiratory status  -increased Free water to 250 q 4  -single dose of albumin 12/25 with improvement in creat  -UO continues to improve, will see how she does without albumin    Recurrent Emesis of gastric contents/no BM  -ck AXR, OG to LIS  _hold  "TF for the evening    Hyperglycemia hgb A1c 5.6 % so likely related to steroids/TF  -cont ISS, minimal requirements at the moment    htn   Home regimen is lisinopril 20 mg every day  -on hold in light of VALE/septic shock and need for pressor support    hlp  -back on home simva 40 mg qd    Hypothyroidism  -back on home replacement with levothyroxine     Depression   -back on home fluoxetine 60 mg qd    FEN net negative 3 L weights are stable.  On TF    Incidentally noted ovarian cysts on admission CT scan, increased in size from prior study  -nonurgent f/u US       DVT Prophylaxis: Enoxaparin (Lovenox) SQ  Code Status: Full Code  Functional Status:    Diet:NPO on TF  Rojas:in place for critical illness  Access PIV x 2 and a PICC    Disposition Plan   Expected discharge in tbd days to tbd once above sorted through.     Entered: Hayley Duke 12/26/2021, 4:26 PM         We are operating under sub optimal conditions in the setting of a world wide pandemic, hospitals are running at full capacity with limited ICU bed availability. We are providing the best possible patient care with limited resources.      Discussed with danette Cortez by phone      Interval History (Subjective):      NE needs increasing, bun/creat worsening.  Increasing respiratory support.  All paint a worsening prognosis.                   Physical Exam:      Last Vital Signs:  /55   Pulse 71   Temp 96.9  F (36.1  C) (Axillary)   Resp (!) 0   Ht 1.549 m (5' 1\")   Wt 85.9 kg (189 lb 6 oz)   SpO2 93%   BMI 35.78 kg/m      I/O: netincrease 1.8 L    UO 2.2 yesterday     Tele NSR    Puffy all over but not true edema lungs coarse fair air movement rrr no mrg belly obese but otherwise s/nt/nd skin warm and dry no cyanosis or clubbing intubated and sedated           Medications:      All current medications were reviewed with changes reflected in problem list.         Data:      All new lab and imaging data was reviewed.   Labs:  Recent Labs   Lab " 12/26/21  1539 12/26/21  0749 12/26/21  0420   NA  --   --  140   POTASSIUM  --   --  5.0   CHLORIDE  --   --  111*   CO2  --   --  22   ANIONGAP  --   --  7   *   < > 121*   BUN  --   --  71*   CR  --   --  1.55*   GFRESTIMATED  --   --  34*   SEDA  --   --  8.1*    < > = values in this interval not displayed.     Recent Labs   Lab 12/26/21 0420   WBC 9.0   HGB 8.6*   HCT 28.2*   *   *     Recent Labs   Lab 12/26/21  1539 12/26/21  1209 12/26/21  0749 12/26/21  0420 12/26/21  0352   * 98 102* 121* 122*     Recent Labs   Lab 12/26/21  0420 12/25/21  0419 12/20/21  0428   AST 19 20 31   ALT 20 21 15   ALKPHOS 55 63 53   BILITOTAL 0.3 0.3 0.2      Imaging:

## 2021-12-27 NOTE — PROGRESS NOTES
[facility] ICU VENTILATOR RESPIRATORY NOTE  Date of Admission: 12/17/21  Date of Intubation (most recent): 12/19/21  Reason for Mechanical Ventilation: Respiratory Failure  Number of Days on Mechanical Ventilation: 9  Met Criteria for Pressure Support Trial: No  Reason for No Pressure Support Trial: pt on PEEP +16  ETT appearance on chest x-ray: In appropriate place       Amber Robles, RT

## 2021-12-27 NOTE — PLAN OF CARE
ICU End of Shift Summary.  For vital signs and complete assessments, please see documentation flowsheets.     Pertinent assessments: Pt on vent support, sedated, rass at goal -5, on fentyl, prop, veletri, levo  to maintain a map over 65, weaned fio2 70,harini feed in place,lung sound are course, thick discharge from the nose, faint BS, N normal SR, edema hands and ankle, good urine output with baxter, no BM this shift,    Major Shift Events:  weaned fio2 from 90 to 70 and titrated levo to maintain map over 65,Plan   (Upcoming Events): To wean down FIo2  Discharge/Transfer Needs: TBD    Bedside Shift Report Completed : y  Bedside Safety Check Completed:y

## 2021-12-27 NOTE — PLAN OF CARE
ICU End of Shift Summary.  For vital signs and complete assessments, please see documentation flowsheets.     Pertinent assessments: On full vent support. Suctioned for yellow thick secretions. Veletri continues to run inline. Sedated at goal with prop and fent. Rhythm sinus, no ectopy. Vasopressors continued to maintain MAP>65. Tolerating TF. OG to LIS, no emesis during shift. BM+. Rojas with adequate uop. Re-proned this evening. Family updated via phone.   Major Shift Events: Supinated @ 0930 and pronated @ 1500. CXR done, ETT tube repositoned.   Plan (Upcoming Events): Continue ICU level cares.   Discharge/Transfer Needs: TBD    Bedside Shift Report Completed : y  Bedside Safety Check Completed:y

## 2021-12-27 NOTE — PROGRESS NOTES
[facility] ICU VENTILATOR RESPIRATORY NOTE  Date of Admission: 12/17/21  Date of Intubation (most recent): 12/19/21  Reason for Mechanical Ventilation: Respiratory Failure  Number of Days on Mechanical Ventilation: 8  Met Criteria for Pressure Support Trial: No  Reason for No Pressure Support Trial: pt on PEEP +16  ETT appearance on chest x-ray: In appropriate place    Respiratory Therapy  Patient remains intubated on mechanical ventilator with the following settings:    Ventilation Mode: CMV/AC  (Continuous Mandatory Ventilation/ Assist Control)  FiO2 (%): 90 %  Rate Set (breaths/minute): 24 breaths/min  Tidal Volume Set (mL): 380 mL  PEEP (cm H2O): 16 cmH2O  Oxygen Concentration (%): 90 %  Resp: 24    Temp: 96.9  F (36.1  C) Temp src: Axillary BP: 101/49 Pulse: 73   Resp: 24 SpO2: 94 % O2 Device: Mechanical Ventilator      BS were diminished. Suctioned a small amount of cloudy thick secretions through the ETT.  Will continue to monitor and support the patient's respiratory needs.    RT Michael  12/26/2021 6:59 PM

## 2021-12-27 NOTE — PROGRESS NOTES
Respiratory Therapy Note    [facility] ICU VENTILATOR RESPIRATORY NOTE  Date of Admission: 12/08/21  Date of Intubation (most recent): 12/19/21  Reason for Mechanical Ventilation: Respiratory Failure  Number of Days on Mechanical Ventilation: 9  Met Criteria for Pressure Support Trial: No  Reason for No Pressure Support Trial: Patient's PEEP +16 cmH20 and FiO2 90%  Significant Events Today: Patient supined this AM and proned this evening at 1650  VBG Results: 7.30/52/45/25    Plan:  Continue to monitor patient's respiratory status.    Ventilation Mode: CMV/AC  (Continuous Mandatory Ventilation/ Assist Control)  FiO2 (%): (S) 90 %  Rate Set (breaths/minute): 24 breaths/min  Tidal Volume Set (mL): 380 mL  PEEP (cm H2O): 16 cmH2O  Oxygen Concentration (%): 70 %  Resp: 24    RT to follow.    Renee Christiansen, RT  December 27, 2021

## 2021-12-27 NOTE — PLAN OF CARE
ICU End of Shift Summary.  For vital signs and complete assessments, please see documentation flowsheets.   [id number]    0800 cares completed, report handed off to KENYON Carrion

## 2021-12-27 NOTE — PROGRESS NOTES
[facility]  Palliative Care Progress Note  Text Page     Assessment & Plan    Sunita Barajas is a 76 year old female who was admitted on 12/8/2021.   Consulted by Tamia Jordan MD  to assist with symptom management, goals of care, and development of plan of care     Recommendations:  1. Goals of Care- No CPR- Pre-arrest intubation OK  Hospitalization goals discussed  Phone call to son Sebastian Barajas  We discussed code status.  Updated today and goals of care unchanged. He is uncertain if he and his brother would decide on a trachestomy, not knowing their mother's wishes.  QoL and meaningful recovery important to family and patient Stated we still  have time to decide this next step.     Sebastian agrees with tube feedings and current medical management.    Decisional Capacity- Unreliable. Patient does not have an advance directive in . Per  informed consent policy next of kin should be involved if patient becomes unable.   Next of kin Sebastian Barajas. Health Care directive reviewed. Emailed 12/21/21 to honoring choices for urgent review. Will follow up in am on status of review.     POLST  Not in EMR, reasonable to complete prior to discharge     2. Pain history of chronic back pain. Chemical sedation for medical healing and pain    Patient's opioid use in past 24 hours:  Fentanyl 125-100 mcg/hr, lower dose since 0018 am =--- mg Daily Morphine Equivalent  [place] Board of Pharmacy Data Base Reviewed:    YES; As expected, no concern for misuse/abuse of controlled medications based on this report. No controlled substance is past 12 months   ORT  NA  ( add dot phrase .FRHORT if warranted)     3. Dyspnea acute respiratory failure secondary to C0V2 PNA, diagnosed 12/17/21 day 2 on mechanical ventilation    -mechanical ventilator  -diprivan/fentanyl for medical healing   -respiratory hygiene   -medical management      4. Spiritual Care  Oriented to Spiritual Health as part of Palliative Care team.  appreciate input from Christina Mei, staff chaplain Yates  Spiritual Background:  undesiginated      5. Care Planning  to be determined   Medications for discharge      Medical Decision Making and Goals of Care:  Discussed on December 27, 2021:  Contacted Sebastian Barajas by phone updated on medical status, making some small improvements. He realizes patient wants all medical care if reasonable out come and QoL could occur, which is consistent with patient's advanced directive.   Still would like to give her more time.  Son interested in having home  give prayer via ipad.     Discussed on December 21, 2021 with Kalyani BERMUDEZ, CNP:  I talked with the patient's son Diego Barajas by phone from 15:00- 15:15.  I introduced my self and the purpose of my involvement on the medical team.  He tells me he dropped off the patient health care directive. He read from the directive stating she wants all measures done, unless she is in a persistent vegetative state, if she would have poor quality of life or if treatment options have been exhausted.  He also stated that he was her primary health car agent .  He is pleased and in agreement with present level of care.    I asked what the patient would want if her heart would stop while in the hospital here.  I did not feel this would happen imminently, however it is possible with the amount of stress she is under.  He did not think she would want CPR under this circumstance. However, it is a grey area. Asked if he had family to discuss this with.Yes, He has a brother. Encouraged him to discuss with his brother and reach consensus.    Diego will call back in am after talking to his brother. I informed him I would review the HCD, and facilitate review by honoring choices so the document can be entered in the the EMR       Kalyani Martell RN,PGMT- BC, APRN, CNP,ACHPN  Pain Management and Palliative Care  [facility]  Pgr:  [phone]      Time Spent on this Encounter   Total unit/floor time 15 minutes, time consisted of the following, examination of the patient, reviewing the record and completing documentation. >50% of time spent in counseling and coordination of care, Bedside Nurse Sheyla Blair RN  and Hospitalist Hayley Duke MD , Capo Resendiz MD  Time spend counseling with family consisted of the following topics, goals of care and symptom management.    Review of Systems    CONSTITUTIONAL: NEGATIVE for fever, chills, change in weight  ENT/MOUTH: NEGATIVE for ear, mouth and throat changes., POSITIVE  ET tube  RESP: full vent support   CV: NEGATIVE for peripheral edema    Palliative Symptom Review (0=no symptom/no concern, 1=mild, 2=moderate, 3=severe):  Unable chemical sedation        Physical Exam   Temp:  [97.3  F (36.3  C)-98.3  F (36.8  C)] 98.1  F (36.7  C)  Pulse:  [63-79] 66  Resp:  [21-30] 29  BP: ()/(37-70) 130/56  FiO2 (%):  [60 %-100 %] (P) 90 %  SpO2:  [87 %-99 %] 92 %  186 lbs 4.62 oz  Exam:  GEN: sedated appears comfortable, occasional cough.  HEENT:  Normocephalic/atraumatic, no scleral icterus, no nasal discharge, mouth moist.  CV: normal rate no edema BLE.  RESP:   Full vent support   ABD:  Rounded, soft, non-tender/non-distended.      Psych:  Sedated chemically, no spontaneous movement     Medications     dextrose       epoprostenol (VELETRI) 20 mcg/mL in sterile water inhalation solution 20 ng/kg/min (12/27/21 0746)     fentaNYL 200 mcg/hr (12/27/21 1200)     propofol (DIPRIVAN) infusion 60 mcg/kg/min (12/27/21 1325)    And     - MEDICATION INSTRUCTIONS -       norepinephrine 0.06 mcg/kg/min (12/27/21 1200)       artificial tears   Both Eyes 8x Daily     [Held by provider] aspirin  81 mg Oral Daily     ceFAZolin  1 g Intravenous Q8H     chlorhexidine  15 mL Swish & Spit BID     dexamethasone  12 mg Intravenous Daily     docusate  100 mg Oral or Feeding Tube BID    And     sennosides  15 mL Oral or  Feeding Tube BID     enoxaparin ANTICOAGULANT  40 mg Subcutaneous Q12H     [Held by provider] FLUoxetine  60 mg Oral Daily     insulin aspart  1-6 Units Subcutaneous Q4H     [Held by provider] ipratropium-albuterol  2 puff Inhalation 4x Daily     lactated ringers  1,000 mL Intravenous Once     lactulose  20 g Oral or Feeding Tube BID     levothyroxine  75 mcg Oral or Feeding Tube Daily     lidocaine (viscous)  5 mL Topical Once     multivitamins w/minerals  15 mL Per Feeding Tube Daily     pantoprazole  40 mg Per Feeding Tube QAM AC    Or     pantoprazole (PROTONIX) IV  40 mg Intravenous QAM AC     protein modular  1 packet Per Feeding Tube TID     [Held by provider] simvastatin  40 mg Oral At Bedtime     sodium chloride (PF)  10-40 mL Intracatheter Q7 Days     sodium chloride (PF)  3 mL Intracatheter Q8H       Data   Results for orders placed or performed during the hospital encounter of 12/08/21 (from the past 24 hour(s))   Blood gas venous and oxyhgb   Result Value Ref Range    pH Venous 7.25 (L) 7.32 - 7.43    pCO2 Venous 50 40 - 50 mm Hg    pO2 Venous 54 (H) 25 - 47 mm Hg    Bicarbonate Venous 22 21 - 28 mmol/L    FIO2 90     Oxyhemoglobin Venous 85 (H) 70 - 75 %    Base Excess/Deficit (+/-) -5.2 -7.7 - 1.9 mmol/L   Glucose by meter   Result Value Ref Range    GLUCOSE BY METER POCT 181 (H) 70 - 99 mg/dL   Glucose by meter   Result Value Ref Range    GLUCOSE BY METER POCT 144 (H) 70 - 99 mg/dL   Glucose by meter   Result Value Ref Range    GLUCOSE BY METER POCT 121 (H) 70 - 99 mg/dL   Basic metabolic panel   Result Value Ref Range    Sodium 141 133 - 144 mmol/L    Potassium 5.1 3.4 - 5.3 mmol/L    Chloride 112 (H) 94 - 109 mmol/L    Carbon Dioxide (CO2) 23 20 - 32 mmol/L    Anion Gap 6 3 - 14 mmol/L    Urea Nitrogen 66 (H) 7 - 30 mg/dL    Creatinine 1.28 (H) 0.52 - 1.04 mg/dL    Calcium 8.0 (L) 8.5 - 10.1 mg/dL    Glucose 110 (H) 70 - 99 mg/dL    GFR Estimate 43 (L) >60 mL/min/1.73m2   Magnesium   Result Value  Ref Range    Magnesium 2.8 (H) 1.6 - 2.3 mg/dL   Phosphorus   Result Value Ref Range    Phosphorus 3.7 2.5 - 4.5 mg/dL   CBC with platelets   Result Value Ref Range    WBC Count 9.3 4.0 - 11.0 10e3/uL    RBC Count 2.86 (L) 3.80 - 5.20 10e6/uL    Hemoglobin 8.7 (L) 11.7 - 15.7 g/dL    Hematocrit 29.5 (L) 35.0 - 47.0 %     (H) 78 - 100 fL    MCH 30.4 26.5 - 33.0 pg    MCHC 29.5 (L) 31.5 - 36.5 g/dL    RDW 14.3 10.0 - 15.0 %    Platelet Count 166 150 - 450 10e3/uL   CK total   Result Value Ref Range    CK 52 30 - 225 U/L   Blood gas venous and oxyhgb   Result Value Ref Range    pH Venous 7.30 (L) 7.32 - 7.43    pCO2 Venous 52 (H) 40 - 50 mm Hg    pO2 Venous 45 25 - 47 mm Hg    Bicarbonate Venous 25 21 - 28 mmol/L    FIO2 70     Oxyhemoglobin Venous 80 (H) 70 - 75 %    Base Excess/Deficit (+/-) -1.5 -7.7 - 1.9 mmol/L   Glucose by meter   Result Value Ref Range    GLUCOSE BY METER POCT 104 (H) 70 - 99 mg/dL   Glucose by meter   Result Value Ref Range    GLUCOSE BY METER POCT 113 (H) 70 - 99 mg/dL

## 2021-12-27 NOTE — PROGRESS NOTES
[facility]  Hospitalist Progress Note  Hayley Duke MD 12/27/2021    Reason for Stay (Diagnosis): Hypoxic respiratory failure         Assessment and Plan:      Summary of Stay: Sunita Barajas is a 76 year old female with a history of htn/hlp/hypothyroidism and depression. She was admitted on 12/8/2021 with hypoxic respiratory failure from COVID 19 pna    She is not vaccinated.     She required high level O2 on presentation to the ED.  Labs notable for VALE with creat 1.25, crp 154.  CT scan consistent with bilateral covid pna but negative for PE.     Her oxygen requirements continued to increase and ultimately she was intubated on 12/19.  Hospitalization has been complicated by ongoing respiratory failure/persistent hypotension necessitating pressor support    Over the course of 12.24 had rough day, ultimately proned with some improvement in her respiratory status.  Given her tenuous state at that time I did have her son Diego and his wife come in for a compassionate visit.     She remains tenuous.  She was supined with drop in sats to the mid and low 80's.  Had e/o emesis of gastric contents.  Given ondansetron.  Has continued to vomit so am evaluating with AXR and OG  Has NJ for feeding and free water flushes.  No BM for about a week    Hospital course notable for VALE on admission, resolved, recurred, and now resolving again     Was having increasing secretions, and now sputum culture with 2 strains of MSSA.  Placed on appropriate abx with decreasing secretions.    Overnight into 12/28 had PEA arrest from ptx, s/p medications and emergent CT placement with ROSC.  This has obviously changed her prognosis and so plans are to switch to comfort measures. Compassionate extubation is planned for this evening      Problem List:   Acute hypoxic respiratory failure  COVID 19 pna  pneumomediastinum  Her sx began about a week pta with cough and generalized weakness.  She fell 2-3 days before presenting to our ER and  had just been laying on the floor since.  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  On presentation to the ED she required 10 L FM to get her O2 sats into the 90's.  -Not vaccinated  -Date of sx onset late November is best guess but did test negative at some point during that time  -Tested positive 12/8/2021  -had progressed to full bipap support for several days before getting intubated on 12/19  -s/p 5 days of remdesivir, completed on 12/12  -s/p 10 days dex at 6 mg, now on dex 12 mg started on 12/20 to finish on 12/29  -baricitinib started on 12/13-discontinued on 12/23  -initially prone positioning not helpful, but has definitely lead to some improvement over the past several days, will prone again today   -enox 40 mg bid  -CT scan on admission with bilateral confluent GGO consistent with COVID pna, No PE.  Repeat CT scan 12/16 with worsening bilateral GGO, still no PE, but notable for extensive pneumomediastinum extending into the neck bilaterally as well as the chest wall-this appears to be improving.   -MV with PEEP 16, FiO2 80 % - > 75 % satting mid 90 % range while proned.  Cont to prone daily- will re-evaluate tomorrow  -still with respiratory acidosis by VBG-recheck after proning  -on epoprostenol since 12/19    Transitioning to COMFORT MEASURES    VAP  Sputum gram stain with 2 strains MSSA  -on day 4 /10 cefazolin,  -discontinue abx    Septic shock from viral pna/ARDS and ESBL UTI  -continues on norepi,   -completed 6 day course of meropenem on 12/16, now back on abx for VAP-see above    VALE/Mild rhabdo with /urinary retention  Resolved and now recurred 2/2 ongoing critical illness concerning for evolving MSOF   Baseline creat 1.1-1.2, had VALE on admission that resolved then recurred.  Appears tob e improving, creat declining and UO is good. No culprit medications, CT with dye too long  ago to be cause.  -Rojas placed 12/15  -no IVF given tenuous respiratory status  -increased Free water to 250 q 4  -single dose of albumin 12/25 with improvement in creat  -creat and UO continue to improve  No more lab draws no more interventions    Recurrent Emesis of gastric contents/no BM  -resolved    Hyperglycemia hgb A1c 5.6 % so likely related to steroids/TF  -cont ISS, minimal requirements at the moment  -will not continue to check     htn   Home regimen is lisinopril 20 mg every day  -on hold in light of VALE/septic shock and need for pressor support  -will not measure or treat    hlp  -back on home simva 40 mg every day  -discontinue due to change in goals of care    Hypothyroidism  -back on home replacement with levothyroxine   -discontinue due to change in goals of care    Depression   -back on home fluoxetine 60 mg every day  -discontinue due to change in goals of care    FEN   On TF  Free water  Discontinued due to change in goals of care    Incidentally noted ovarian cysts on admission CT scan, increased in size from prior study  -nonurgent f/u US       DVT Prophylaxis: Enoxaparin (Lovenox) SQ  Code Status: DNR, she has been very clear in the past that she wants a meaningful quality of life.  Diego is very aware of this and looks to us for guidance on when we should really consider stopping therapy.  We've discussed it a few times, I think somewhere in the 2-3 week time window if she's really not showing some significant improvement it might be reasonable to discuss extubation/comfort care at that time.  I get the impression that she would not want prolonged rehab, being dependent on others, she seems to be one who valued her independence a lot.   Functional Status:    Diet:NPO on TF  Rojas:in place for critical illness  Access PIV x 2 and a PICC    Disposition Plan   Expected discharge in tbd days to tbd once above sorted through.     Entered: Hayley Duke 12/27/2021, 1:50 PM         We are operating  "under sub optimal conditions in the setting of a world wide pandemic, hospitals are running at full capacity with limited ICU bed availability. We are providing the best possible patient care with limited resources.      Left VM for Diego with brief update and how to reach me if he has any questions      Interval History (Subjective):      Overnight into 12/28 had PEA arrest from ptx, s/p medications and emergent CT placement with ROSC.  This has obviously changed her prognosis and so plans are to switch to comfort measures. Compassionate extubation is planned for this evening                  Physical Exam:      Last Vital Signs:  /52   Pulse 65   Temp 98.1  F (36.7  C) (Axillary)   Resp 24   Ht 1.549 m (5' 1\")   Wt 84.5 kg (186 lb 4.6 oz)   SpO2 98%   BMI 35.20 kg/m      Proned, has crepitus over right back            Medications:      All current medications were reviewed with changes reflected in problem list.         Data:      All new lab and imaging data was reviewed.   Labs:  Recent Labs   Lab 12/27/21  1204 12/27/21  0802 12/27/21  0509   NA  --   --  141   POTASSIUM  --   --  5.1   CHLORIDE  --   --  112*   CO2  --   --  23   ANIONGAP  --   --  6   *   < > 110*   BUN  --   --  66*   CR  --   --  1.28*   GFRESTIMATED  --   --  43*   SEDA  --   --  8.0*    < > = values in this interval not displayed.     Recent Labs   Lab 12/27/21  0509   WBC 9.3   HGB 8.7*   HCT 29.5*   *        Recent Labs   Lab 12/27/21  1204 12/27/21  0802 12/27/21  0509 12/27/21  0337 12/26/21  2357   * 104* 110* 121* 144*     Recent Labs   Lab 12/26/21  0420 12/25/21  0419   AST 19 20   ALT 20 21   ALKPHOS 55 63   BILITOTAL 0.3 0.3      Imaging:   "

## 2021-12-28 NOTE — PROGRESS NOTES
[facility]  Hospitalist Progress Note  Hayley Duke MD 12/28/2021    Reason for Stay (Diagnosis): Hypoxic respiratory failure         Assessment and Plan:      Summary of Stay: Sunita Barajas is a 76 year old female with a history of htn/hlp/hypothyroidism and depression. She was admitted on 12/8/2021 with hypoxic respiratory failure from COVID 19 pna    She is not vaccinated.     She required high level O2 on presentation to the ED.  Labs notable for VALE with creat 1.25, crp 154.  CT scan consistent with bilateral covid pna but negative for PE.     Her oxygen requirements continued to increase and ultimately she was intubated on 12/19.  Hospitalization has been complicated by ongoing respiratory failure/persistent hypotension necessitating pressor support    Over the course of 12.24 had rough day, ultimately proned with some improvement in her respiratory status.  Given her tenuous state at that time I did have her son Diego and his wife come in for a compassionate visit.     She remains tenuous.  She was supined with drop in sats to the mid and low 80's.  Had e/o emesis of gastric contents.  Given ondansetron.  Has continued to vomit so am evaluating with AXR and OG  Has NJ for feeding and free water flushes.  No BM for about a week    Hospital course notable for VALE on admission, resolved, recurred, and now resolving again     Was having increasing secretions, and now sputum culture with 2 strains of MSSA.  Placed on appropriate abx with decreasing secretions.      Problem List:   Acute hypoxic respiratory failure  COVID 19 pna  pneumomediastinum  Her sx began about a week pta with cough and generalized weakness.  She fell 2-3 days before presenting to our ER and had just been laying on the floor since.  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  On presentation to the ED she required 10 L FM  to get her O2 sats into the 90's.  -Not vaccinated  -Date of sx onset late November is best guess but did test negative at some point during that time  -Tested positive 12/8/2021  -had progressed to full bipap support for several days before getting intubated on 12/19  -s/p 5 days of remdesivir, completed on 12/12  -s/p 10 days dex at 6 mg, now on dex 12 mg started on 12/20 to finish on 12/29  -baricitinib started on 12/13-discontinued on 12/23  -initially prone positioning not helpful, but has definitely lead to some improvement over the past several days, will prone again today   -enox 40 mg bid  -CT scan on admission with bilateral confluent GGO consistent with COVID pna, No PE.  Repeat CT scan 12/16 with worsening bilateral GGO, still no PE, but notable for extensive pneumomediastinum extending into the neck bilaterally as well as the chest wall-this appears to be improving.   -MV with PEEP 16, FiO2 80 % - > 75 % satting mid 90 % range while proned.  Cont to prone daily- will re-evaluate tomorrow  -still with respiratory acidosis by VBG-recheck after proning  -on epoprostenol since 12/19    VAP  Sputum gram stain with 2 strains MSSA  -on day 4 /10 cefazolin,    Septic shock from viral pna/ARDS and ESBL UTI  -continues on norepi,   -completed 6 day course of meropenem on 12/16, now back on abx for VAP-see above    VALE/Mild rhabdo with /urinary retention  Resolved and now recurred 2/2 ongoing critical illness concerning for evolving MSOF   Baseline creat 1.1-1.2, had VALE on admission that resolved then recurred.  Appears tob e improving, creat declining and UO is good. No culprit medications, CT with dye too long ago to be cause.  -Rojas placed 12/15  -no IVF given tenuous respiratory status  -increased Free water to 250 q 4  -single dose of albumin 12/25 with improvement in creat  -creat and UO continue to improve    Recurrent Emesis of gastric contents/no BM  -resolved    Hyperglycemia hgb A1c 5.6 % so  "likely related to steroids/TF  -cont ISS, minimal requirements at the moment    htn   Home regimen is lisinopril 20 mg every day  -on hold in light of VALE/septic shock and need for pressor support    hlp  -back on home simva 40 mg qd    Hypothyroidism  -back on home replacement with levothyroxine     Depression   -back on home fluoxetine 60 mg qd    FEN   On TF  Free water    Incidentally noted ovarian cysts on admission CT scan, increased in size from prior study  -nonurgent f/u US       DVT Prophylaxis: Enoxaparin (Lovenox) SQ  Code Status: DNR, she has been very clear in the past that she wants a meaningful quality of life.  Diego is very aware of this and looks to us for guidance on when we should really consider stopping therapy.  We've discussed it a few times, I think somewhere in the 2-3 week time window if she's really not showing some significant improvement it might be reasonable to discuss extubation/comfort care at that time.  I get the impression that she would not want prolonged rehab, being dependent on others, she seems to be one who valued her independence a lot.   Functional Status:    Diet:NPO on TF  Rojas:in place for critical illness  Access PIV x 2 and a PICC    Disposition Plan   Expected discharge in tbd days to tbd once above sorted through.     Entered: Hayley Duke 12/28/2021, 4:59 PM         We are operating under sub optimal conditions in the setting of a world wide pandemic, hospitals are running at full capacity with limited ICU bed availability. We are providing the best possible patient care with limited resources.      Left VM for Diego with brief update and how to reach me if he has any questions      Interval History (Subjective):      Fairly stable overnight, continues to prone.                    Physical Exam:      Last Vital Signs:  /56   Pulse 87   Temp 99.8  F (37.7  C) (Axillary)   Resp (!) 39   Ht 1.549 m (5' 1\")   Wt 84.5 kg (186 lb 4.6 oz)   SpO2 (!) 87%   " BMI 35.20 kg/m      I/O: even  UO 2.5 L yesterday     Tele NSR    Puffy all over has 1 + bilateral foot edema  lungs coarse fair air movement rrr no mrg belly obese but otherwise s/nt/nd skin warm and dry no cyanosis or clubbing intubated and sedated.           Medications:      All current medications were reviewed with changes reflected in problem list.         Data:      All new lab and imaging data was reviewed.   Labs:  Recent Labs   Lab 12/28/21  1552 12/28/21  0753 12/28/21  0512   NA  --   --  142   POTASSIUM  --   --  4.9   CHLORIDE  --   --  111*   CO2  --   --  26   ANIONGAP  --   --  5   *   < > 96   BUN  --   --  63*   CR  --   --  0.97   GFRESTIMATED  --   --  60*   SEDA  --   --  8.1*    < > = values in this interval not displayed.     Recent Labs   Lab 12/28/21  0512   WBC 9.8   HGB 9.1*   HCT 29.2*           Recent Labs   Lab 12/28/21  1552 12/28/21  1206 12/28/21  0753 12/28/21  0512 12/28/21  0410   * 131* 99 96 86     Recent Labs   Lab 12/28/21  0512 12/26/21  0420 12/25/21  0419   AST 28 19 20   ALT 13 20 21   ALKPHOS 58 55 63   BILITOTAL 0.3 0.3 0.3      Imaging:

## 2021-12-28 NOTE — PROGRESS NOTES
[facility]  Palliative Care Progress Note  Text Page     Assessment & Plan   Sunita Barajas is a 76 year old female who was admitted on 12/8/2021.   Consulted by Tamia Jordan MD  to assist with symptom management, goals of care, and development of plan of care     Recommendations:  1. Goals of Care- No CPR- Do NOT Intubate- comfort cares and terminal extubation at 430pm  Hospitalization goals discussed  Care conference with patients two sons and their wives. Discussed and reviewed the prognosis and events over the last 24 hours. Sebastian stated that he and his brother have talked about the goals and what she would have wanted in these circumstances, they elected to transition to comfort cares after the family is able to come in and say their good byes around 430pm today. Ordered placed   Decisional Capacity- Unreliable.Patient has a health care directive and primary agent is Sebastian Barajas and alternate is Sherman Barajas      2. Pain history of chronic back pain  Continue with fentanyl for comfort and sedation  -OK to turn wean oxygenation to 21 % while on fentanyl     3. Dyspnea  -robinol prior to turning off the ventilator   Continue with vent support, wean to 21% so family is able to be at the bedside for her death.     4. Spiritual Care  Oriented to Spiritual Health as part of Palliative Care team. appreciate input from Christina Mei, staff chaplain Yates  Spiritual Background:  undesiginated      5. Care Planning  to be determined   Medications for discharge     Medical Decision Making and Goals of Care:  Discussed on December 28, 2021 with Mallika BERMUDEZ, CNP: Phone conference with sons Sebastian and Sherman. Discussed and reviewed the above. Discussed what comadan [place] would look like and discussed the visitor policy of up to 6 people, 2 at a time. Discussed her covid isolation and safe way to make her comfort cares and allow the family to be at the bedside and limiting the risk of  exposure. Discussed that we will continue to keep her sedated as this would be the best symptom management and allow her to pass comfortably. Family will let the staff know when they are ready. Discussed not escalating cares in the mean time. The family is in agreement with this plan of care. Questions asked and answered. Medications ordered and care coordinated with the bedside nurse and the medical team.      Mallika BERMUDEZ CNP  Pain Management and Palliative Care  [facility]  Pgr: [phone]    Time Spent on this Encounter   Total unit/floor time 75 minutes, time consisted of the following, examination of the patient, reviewing the record and completing documentation. >50% of time spent in counseling and coordination of care, Bedside Nurse Alis and Hospitalist Dr Duke, Dr Resendiz.  Time spend counseling with family consisted of the following topics, goals of care, medical decision making regarding comfort cares, symptom management and education, education about diagnosis, education about prognosis and symptom management.    Total Visit Time: 75 mins  o For Outpatient, 'Total Visit Time' = Face-to-Face time only.  o For Inpatient, 'Total Visit Time' = Unit Floor + Face-to-Face time.    Total Face-to-Face Prolonged Service Time: 75    Content of the Prolonged Time: care coordination, goals of care conversations, medical management at EOL.    Review of Systems   Unable to assess due to intubation and sedated    Physical Exam   Temp:  [97.6  F (36.4  C)-100.7  F (38.2  C)] 100.7  F (38.2  C)  Pulse:  [] 100  Resp:  [11-41] 32  BP: ()/(37-97) 120/48  FiO2 (%):  [70 %-100 %] 100 %  SpO2:  [61 %-99 %] 87 %  186 lbs 4.62 oz  Exam:  GENERAL APPEARANCE:  intubated and sedated  RESP:  full vent support, proned  CV:  rate-normal  PSYCH:  intubated and sedated    Medications     dextrose       EPINEPHrine Stopped (12/27/21 2108)     epoprostenol (VELETRI) 20 mcg/mL in sterile water  inhalation solution 20 ng/kg/min (12/28/21 0102)     fentaNYL 150 mcg/hr (12/28/21 0830)     propofol (DIPRIVAN) infusion 40 mcg/kg/min (12/28/21 0805)    And     - MEDICATION INSTRUCTIONS -       norepinephrine 0.14 mcg/kg/min (12/28/21 0820)       artificial tears   Both Eyes 8x Daily     [Held by provider] aspirin  81 mg Oral Daily     calcium chloride  1 g Intravenous Once     ceFAZolin  1 g Intravenous Q8H     chlorhexidine  15 mL Swish & Spit BID     dexamethasone  12 mg Intravenous Daily     docusate  100 mg Oral or Feeding Tube BID    And     sennosides  15 mL Oral or Feeding Tube BID     enoxaparin ANTICOAGULANT  40 mg Subcutaneous Q12H     [Held by provider] FLUoxetine  60 mg Oral Daily     insulin aspart  1-6 Units Subcutaneous Q4H     [Held by provider] ipratropium-albuterol  2 puff Inhalation 4x Daily     lactated ringers  1,000 mL Intravenous Once     lactulose  20 g Oral or Feeding Tube BID     levothyroxine  75 mcg Oral or Feeding Tube Daily     lidocaine (viscous)  5 mL Topical Once     multivitamins w/minerals  15 mL Per Feeding Tube Daily     pantoprazole  40 mg Per Feeding Tube QAM AC    Or     pantoprazole (PROTONIX) IV  40 mg Intravenous QAM AC     protein modular  1 packet Per Feeding Tube TID     [Held by provider] simvastatin  40 mg Oral At Bedtime     sodium chloride (PF)  10-40 mL Intracatheter Q7 Days     sodium chloride (PF)  3 mL Intracatheter Q8H       Data   Results for orders placed or performed during the hospital encounter of 12/08/21 (from the past 24 hour(s))   Glucose by meter   Result Value Ref Range    GLUCOSE BY METER POCT 113 (H) 70 - 99 mg/dL   Glucose by meter   Result Value Ref Range    GLUCOSE BY METER POCT 148 (H) 70 - 99 mg/dL   XR Chest Port 1 View    Narrative    EXAM: XR CHEST PORT 1 VIEW  LOCATION: [facility]  DATE/TIME: 12/27/2021 8:20 PM    INDICATION: ETT/Chest Tube.  COMPARISON: 12/27/2021.      Impression    IMPRESSION: Endotracheal tube  tip 4 cm above the conor. Enteric tubes with tips below the diaphragm. Right PICC line tip over the mid SVC. Left chest tube is new since the prior study. Previously seen left pneumothorax is no longer present. Normal heart   size. Hazy diffuse bilateral pulmonary infiltrates most marked in the lower lungs. Extensive diffuse subcutaneous emphysema, increased from previous. Calcified breast implants.   XR Chest Port 1 View    Narrative    EXAM: XR CHEST PORT 1 VIEW  LOCATION: [facility]  DATE/TIME: 12/27/2021 8:21 PM    INDICATION: Desaturation.  COMPARISON: 12/26/2021.      Impression    IMPRESSION: Endotracheal tube tip 3 cm above the conor. Enteric tubes with tips below the diaphragm. Right PICC line tip over the mid SVC. Large left pneumothorax is new with shift of mediastinal structures to the right indicative of underlying tension   pneumothorax. Diffuse opacification of the collapsed left lung. Diffuse hazy infiltrate in the right lung. Subcutaneous emphysema over the chest and neck. No significant bony abnormalities. Calcified breast implants.    At the time of this reading, a chest tube had already been placed with resolution of the left pneumothorax. 12/27/2021 8:28 PM.   Glucose by meter   Result Value Ref Range    GLUCOSE BY METER POCT 179 (H) 70 - 99 mg/dL   Blood gas venous and oxyhgb   Result Value Ref Range    pH Venous 7.34 7.32 - 7.43    pCO2 Venous 52 (H) 40 - 50 mm Hg    pO2 Venous 42 25 - 47 mm Hg    Bicarbonate Venous 28 21 - 28 mmol/L    FIO2 100     Oxyhemoglobin Venous 76 (H) 70 - 75 %    Base Excess/Deficit (+/-) 2.0 (H) -7.7 - 1.9 mmol/L   Glucose by meter   Result Value Ref Range    GLUCOSE BY METER POCT 140 (H) 70 - 99 mg/dL   Glucose by meter   Result Value Ref Range    GLUCOSE BY METER POCT 86 70 - 99 mg/dL   Magnesium   Result Value Ref Range    Magnesium 2.4 (H) 1.6 - 2.3 mg/dL   Phosphorus   Result Value Ref Range    Phosphorus 3.2 2.5 - 4.5 mg/dL    Triglycerides   Result Value Ref Range    Triglycerides 476 (H) <150 mg/dL   Comprehensive metabolic panel   Result Value Ref Range    Sodium 142 133 - 144 mmol/L    Potassium 4.9 3.4 - 5.3 mmol/L    Chloride 111 (H) 94 - 109 mmol/L    Carbon Dioxide (CO2) 26 20 - 32 mmol/L    Anion Gap 5 3 - 14 mmol/L    Urea Nitrogen 63 (H) 7 - 30 mg/dL    Creatinine 0.97 0.52 - 1.04 mg/dL    Calcium 8.1 (L) 8.5 - 10.1 mg/dL    Glucose 96 70 - 99 mg/dL    Alkaline Phosphatase 58 40 - 150 U/L    AST 28 0 - 45 U/L    ALT 13 0 - 50 U/L    Protein Total 5.6 (L) 6.8 - 8.8 g/dL    Albumin 1.4 (L) 3.4 - 5.0 g/dL    Bilirubin Total 0.3 0.2 - 1.3 mg/dL    GFR Estimate 60 (L) >60 mL/min/1.73m2   CBC with platelets   Result Value Ref Range    WBC Count 9.8 4.0 - 11.0 10e3/uL    RBC Count 2.93 (L) 3.80 - 5.20 10e6/uL    Hemoglobin 9.1 (L) 11.7 - 15.7 g/dL    Hematocrit 29.2 (L) 35.0 - 47.0 %     78 - 100 fL    MCH 31.1 26.5 - 33.0 pg    MCHC 31.2 (L) 31.5 - 36.5 g/dL    RDW 14.6 10.0 - 15.0 %    Platelet Count 178 150 - 450 10e3/uL   CRP inflammation   Result Value Ref Range    CRP Inflammation 152.0 (H) 0.0 - 8.0 mg/L   Glucose by meter   Result Value Ref Range    GLUCOSE BY METER POCT 99 70 - 99 mg/dL

## 2021-12-28 NOTE — PROGRESS NOTES
"ICU Staff:     I discussed the ICU supportive care of the patient with the members of the patient's family today using the visual bedside camera system.  All of the patient's family members now agree that the patient would not have wanted the advanced ICU supportive care that has been used to artificially prolong the patient's life such as using the ventilator support or continue to receive the medications used to artifically increase the patient's blood pressure. The patient has an advanced directive and according to the family member the patient's advanced directive clearly states the circumstances in which the patient's family members should make the recommendation to change the goals of care to \"comfort measures.\" The patient's family members understands the \"end of life\" nature of this change and they wish to make this change today. The patient's family members plans to be at the patient's bedside at the time that the changes are made in the patient's goals of care. The palliative care staff was also present during the family meeting today.    Capo Resendiz M.D., Ph.D.  "

## 2021-12-28 NOTE — PROGRESS NOTES
[facility] ICU VENTILATOR RESPIRATORY NOTE  Date of Admission: 12/08/21  Date of Intubation (most recent): 12/19/21  Reason for Mechanical Ventilation: Respiratory Failure  Number of Days on Mechanical Ventilation: 10  Met Criteria for Pressure Support Trial: No  Reason for No Pressure Support Trial: Patient's PEEP +16 cmH20, FiO2 100% and full strength veletri  Significant Events Today: called to bedside due to SpO2 in the low to mid 70's.  RT suctioned and lavage x2 with small amount of thick tan.  High peak pressure in the 50's.  Diminished lung sounds on the left.  RN/RT turned head with no improvement.  Sat dropped to 60's, bagged patient.  MD at bedside.  Pt supined per MD.  CXR showed pneumothorax, MD emergently placed left chest tube at bedside.  ETT pulled back to 23cm at teeth by CRNA, repeat CXR: ETT is 3 cm above conor.    Current vent settings  Ventilation Mode: CMV/AC  (Continuous Mandatory Ventilation/ Assist Control)  FiO2 (%): (S) 100 %  Rate Set (breaths/minute): 24 breaths/min  Tidal Volume Set (mL): 380 mL  PEEP (cm H2O): (S) 16 cmH2O (changed back to 16 due to Pneumothorax)  Oxygen Concentration (%): 100 %  Resp: 24    Plan:  Continue to monitor patient's respiratory status.    RT Brock on 12/28/2021 at 4:36 AM

## 2021-12-28 NOTE — PLAN OF CARE
ICU End of Shift Summary.  For vital signs and complete assessments, please see documentation flowsheets.      Pertinent assessments:   Neuro: RASS -4 to -5  Cardiac: SR. Continues on Levophed for BP support.   Resp: Continues on total vent support. See flow sheet.   GI: BS +, BM x 1 today. TF in place running at goal. OG to LIS  : Rojas cath n place draining clear yellow urine.   Lines: PICC, PIV  Drips: Prop, Fent, Levo    Major Shift Events:     Supined at 11 am    Prone at 1700.    Plan (Upcoming Events): Continues on ancef, decadron and lovenox. Attempt to wean sedation and vent as able.   Discharge/Transfer Needs: TBD     Bedside Shift Report Completed : yes  Bedside Safety Check Completed: yes      Pt oxygen stat dropped to mid 70's at 1900. RT to bedside. Pt sats continued to drop. New crepitus to left chest and lung.  Tele hub called - recommend stat chest xray and 1 amp of Bicarb. See MAR. Pt Supined per MD Resendiz.  Chest xray showed new pneumothorax to left lung. Chest tube placed at bedside.  Pt oxygen status improved to mid 80's. See flow sheets. Family called and now at bedside for compassionate visit.

## 2021-12-28 NOTE — PROGRESS NOTES
ICU Staff:    I examined the patient today.  I reviewed the patient's medical record and the progress notes today. The patient is a 76-year-old woman with a history of hypertension and hypothyroidism who was admitted to the intensive care unit for acute hypoxic respiratory failure due to COVID-19 pneumonia and ARDS.  The patient's sputum culture now demonstrates 3+ gram-positive cocci.     MEDICATIONS:  Current Facility-Administered Medications   Medication     acetaminophen (TYLENOL) solution 650 mg    Or     acetaminophen (TYLENOL) Suppository 650 mg     artificial tears ophthalmic ointment     [Held by provider] aspirin EC tablet 81 mg     bisacodyl (DULCOLAX) Suppository 10 mg     calcium chloride 1 g in sodium chloride 0.9 % 100 mL intermittent infusion     carboxymethylcellulose PF (REFRESH PLUS) 0.5 % ophthalmic solution 1 drop     ceFAZolin (ANCEF) intermittent infusion 1 g     chlorhexidine (PERIDEX) 0.12 % solution 15 mL     dexamethasone PF (DECADRON) injection 12 mg     dextrose 10% infusion     glucose gel 15-30 g    Or     dextrose 50 % injection 25-50 mL    Or     glucagon injection 1 mg     docusate (COLACE) 50 MG/5ML liquid 100 mg    And     sennosides (SENOKOT) syrup 15 mL     enoxaparin ANTICOAGULANT (LOVENOX) injection 40 mg     EPINEPHrine (ADRENALIN) 1 MG/10ML injection     EPINEPHrine (ADRENALIN) 5 mg in  mL infusion     epoprostenol (VELETRI) 20 mcg/mL in sterile water inhalation solution     fentaNYL (PF) (SUBLIMAZE) injection  mcg     fentaNYL (SUBLIMAZE) infusion     [Held by provider] FLUoxetine (PROzac) capsule 60 mg     guaiFENesin-dextromethorphan (ROBITUSSIN DM) 100-10 MG/5ML syrup 10 mL     hydrOXYzine (ATARAX) tablet 10 mg     insulin aspart (NovoLOG) injection (RAPID ACTING)     [Held by provider] ipratropium-albuterol (COMBIVENT RESPIMAT) inhaler 2 puff     lactated ringers BOLUS 1,000 mL     lactulose (CHRONULAC) solution 20 g     levothyroxine (SYNTHROID/LEVOTHROID)  tablet 75 mcg     lidocaine (viscous) (XYLOCAINE) 2 % solution 5 mL     propofol (DIPRIVAN) infusion    And     propofol (DIPRIVAN) bolus from infusion pump 10-20 mg    And     Medication Instruction     midazolam (VERSED) injection 2 mg     multivitamins w/minerals liquid 15 mL     naloxone (NARCAN) injection 0.2 mg    Or     naloxone (NARCAN) injection 0.4 mg    Or     naloxone (NARCAN) injection 0.2 mg    Or     naloxone (NARCAN) injection 0.4 mg     norepinephrine (LEVOPHED) 4 mg in  mL infusion PREMIX     ondansetron (ZOFRAN-ODT) ODT tab 4 mg    Or     ondansetron (ZOFRAN) injection 4 mg     oxyCODONE (ROXICODONE) solution 2.5 mg     pantoprazole (PROTONIX) 2 mg/mL suspension 40 mg    Or     pantoprazole (PROTONIX) IV push injection 40 mg     prochlorperazine (COMPAZINE) injection 5 mg    Or     prochlorperazine (COMPAZINE) tablet 5 mg    Or     prochlorperazine (COMPAZINE) suppository 12.5 mg     protein modular (PROSOURCE TF) 1 packet     [Held by provider] simvastatin (ZOCOR) tablet 40 mg     sodium bicarbonate 8.4 % injection 50 mEq     sodium bicarbonate 8.4 % injection 50 mEq     sodium bicarbonate 8.4 % injection     sodium chloride (PF) 0.9% PF flush 10-20 mL     sodium chloride (PF) 0.9% PF flush 10-40 mL     sodium chloride (PF) 0.9% PF flush 10-40 mL     sodium chloride (PF) 0.9% PF flush 3 mL     sodium chloride (PF) 0.9% PF flush 3 mL        ALLERGIES:  Allergies   Allergen Reactions     Sulfa Drugs         SOCIAL HISTORY:  Social History     Socioeconomic History     Marital status: Single     Spouse name: None     Number of children: None     Years of education: None     Highest education level: None   Occupational History     None   Tobacco Use     Smoking status: None     Smokeless tobacco: None   Substance and Sexual Activity     Alcohol use: None     Drug use: None     Sexual activity: None   Other Topics Concern     None   Social History Narrative     None     Social Determinants of  "Health     Financial Resource Strain: Not on file   Food Insecurity: Not on file   Transportation Needs: Not on file   Physical Activity: Not on file   Stress: Not on file   Social Connections: Not on file   Intimate Partner Violence: Not on file   Housing Stability: Not on file     PHYSICAL EXAM:  Vital Signs: BP (!) 141/63   Pulse 72   Temp 98.2  F (36.8  C) (Axillary)   Resp 24   Ht 1.549 m (5' 1\")   Wt 84.5 kg (186 lb 4.6 oz)   SpO2 (!) 84%   BMI 35.20 kg/m    HEENT: PERRLA. pupils 2 mm bilaterally  Cor: RR no murmur  Pulm: Coarse BS bilaterally  ABD: soft and non-tender; no masses; Rojas in place.  EXT: warm and well perfused.     A/P: The patient is a 76-year-old woman with a history of hypertension and hypothyroidism who was admitted to the intensive care unit for acute hypoxic respiratory failure due to COVID-19 pneumonia and ARDS.  The patient's sputum culture now demonstrates 3+ gram-positive cocci.    Neuro: History of depression on fluoxetine 60 mg every day    Cardiac: Maintain ICU cardiac monitor    PULM: COVID-19 pneumonia and ARDS.  The patient's sputum culture now demonstrates 3+ gram-positive cocci.  Acute hypoxic respiratory failure due to COVID 19 pneumonia.  The patient has also had pneumomediastinum.  Her symptoms began a week before her presentation.  The patient had fallen 2-3 days before presenting to the ED. The patient was initially prone positioning today but now is supine after requiring a left CT placement for a large left pneumothorax.    ID: Not vaccinated COVID-19 pneumonia and ARDS.  The patient's sputum culture now demonstrates 3+ gram-positive cocci. The patient appears to be in septic shock associated with a and ESBL UTI treated with a six day course of meropenem starting on 12/16; Rojas placed 12/15. Completed 5 days of remdesivir, 10 days Dex at 6 mg, and now on Dex 12 mg started on 12/20; baricitinib started on 12/13-discontinued on 12/23. The patient was initially " prone positioning today but now is supine after requiring a left CT placement for a large pneumothorax.     GI: history of recurrent emesis of gastric contents.      ENDO: Hyperglycemia is likely related to steroids.  Hypothyroidism on replacement with levothyroxine.     I personally examined and evaluated the patient today in the [facility] ICU. The patient remains critically ill today. All of the ICU patient care orders and treatments were placed at my direction. I personally managed the patient's ICU supportive measures that were required as the patient's critical illness creates a continuous threat for loss of life for the patient. Key critical care decisions were made by me today to maintain life-saving effective ICU supportive care. I spent 30 minutes providing critical care services at the bedside and on the critical care unit, evaluating the patient, directing care, and reviewing the patient's laboratory values and the patient's radiologic imaging reports associated with the patient's critical illness. I have evaluated all laboratory values and imaging studies from the past 24 hours.. I actively assessed this acute critically ill patient and I personally provided supportive interventions that were required because the patient has acute and persistent imminently life-threatening organ system failure. The critical care provided required high complexity decision making and clinical evaluation as the patient has an acute critical illness that impairs one or more vital organs. The patient has a high probability of imminent or life-threatening deterioration. I personally spent 30 minutes of Critical Care Time which was exclusive of any time required to perform any bedside procedures. The Critical Care Time was required to personally provide and direct critical care services at the bedside and on the critical care unit for this acutely critically ill patient. I personally managed the patient's  sedation, pain control and analgesia, metabolic abnormalities, nutritional status, and vasoactive medications.    Capo Resendiz MD, PhD

## 2021-12-28 NOTE — PROGRESS NOTES
Respiratory Therapy Note    [facility] ICU VENTILATOR RESPIRATORY NOTE  Date of Admission: 12/08/21  Date of Intubation (most recent): 12/19/21  Reason for Mechanical Ventilation: Respiratory Failure  Number of Days on Mechanical Ventilation: 10  Met Criteria for Pressure Support Trial: No  Reason for No Pressure Support Trial: Patient's PEEP +16 cmH20, FiO2 100%, and patient proned  Significant Events Today: Patient proned all shift. Q2 hr head turns done. Patient's oxygen saturations 75-86%.  VBG Results: 7.28/52/40/25     Plan:  Continue to monitor patient's respiratory status.     Ventilation Mode: CMV/AC  (Continuous Mandatory Ventilation/ Assist Control)  FiO2 (%): (S) 100 %  Rate Set (breaths/minute): 24 breaths/min  Tidal Volume Set (mL): 380 mL  PEEP (cm H2O): 16 cmH2O  Oxygen Concentration (%): 100 %  Resp: 24     RT to follow.    Renee Christiansen, RT  December 28, 2021

## 2021-12-28 NOTE — PROGRESS NOTES
SPIRITUAL HEALTH SERVICES Progress Note    On call page to patient's room and conference room off ICU.    I was told on the phone with ICU staff that patient's medical record states undesignated   as her [employer] tradition; but the family are Christians and that they want to see a .    Upon arrival to family conference room, I received a brief update on the patient.    I clarified their [employer] tradition.  Son Diego said they are [employer].    Since this [employer] denomination does not recognize women clergy, I asked the family if it would be better for them if I left; since I respect the fact that they do not approve of women clergy.  They said yes. I wished them well and made my departure.      Rev. Christina Mei, Trudy.  Staff   Phone  [phone]

## 2021-12-28 NOTE — PLAN OF CARE
ICU End of Shift Summary.  For vital signs and complete assessments, please see documentation flowsheets.     Pertinent assessments: Sedated on ventilator, LS coarse, crepeteis noted to upper torso, prominent between shoulder blades.  Pt tele SR/ST, BP laibile on norepinephrine.  Pt without BM, good urine output this shift.  Major Shift Events: Chest tube inserted by Dr. Resendiz, family wanting full cares at this time, proned at 2230, Sats maintaining in 80s, Tele ICU aware, no further treatments available  Plan (Upcoming Events): Monitor respiratory status, telemetry.  Establish further goals of care  Discharge/Transfer Needs: Requires ICU cares at this time    Bedside Shift Report Completed : y  Bedside Safety Check Completed:y

## 2021-12-28 NOTE — PLAN OF CARE
ICU End of Shift Summary.  For vital signs and complete assessments, please see documentation flowsheets.     Pertinent assessments: On full vent support. Suctioned for yellow thick secretions. Sating 80s all day. Veletri continues to run inline. Sedated at goal with prop and fent. Rhythm sinus, no ectopy. Vasopressors continued to maintain MAP>65. Supinated for cares @ 1600. Plan to transition to comfort cares once fam is ready. Family is at bedside.   Major Shift Events: Proned all day.   Plan (Upcoming Events): Extubated terminally.   Discharge/Transfer Needs:      Bedside Shift Report Completed : Y  Bedside Safety Check Completed:Y

## 2021-12-28 NOTE — PROGRESS NOTES
ICU Staff:   I examined the patient in the [facility] ICU. I reviewed the patient's medical record and the progress notes. I participated in the Multidisciplinary ICU Care Rounds; and I agree with the assessment and the plans documented by the other members of the [facility] ICU team. I discussed the patient's history, physical findings, labs, and imaging studies with the other members of the [facility] ICU care team.  The patient is a 76-year-old woman with a history of hypertension and hypothyroidism who was admitted to the intensive care unit for acute hypoxic respiratory failure due to COVID-19 pneumonia and ARDS.  The patient's sputum culture now demonstrates 3+ gram-positive cocci.     MEDICATIONS:  Current Facility-Administered Medications   Medication     acetaminophen (TYLENOL) solution 650 mg    Or     acetaminophen (TYLENOL) Suppository 650 mg     artificial tears ophthalmic ointment     [Held by provider] aspirin EC tablet 81 mg     atropine 1 % ophthalmic solution 1 drop     bisacodyl (DULCOLAX) Suppository 10 mg     calcium chloride 1 g in sodium chloride 0.9 % 100 mL intermittent infusion     carboxymethylcellulose PF (REFRESH PLUS) 0.5 % ophthalmic solution 1 drop     ceFAZolin (ANCEF) intermittent infusion 1 g     chlorhexidine (PERIDEX) 0.12 % solution 15 mL     dexamethasone PF (DECADRON) injection 12 mg     dextrose 10% infusion     glucose gel 15-30 g    Or     dextrose 50 % injection 25-50 mL    Or     glucagon injection 1 mg     docusate (COLACE) 50 MG/5ML liquid 100 mg    And     sennosides (SENOKOT) syrup 15 mL     enoxaparin ANTICOAGULANT (LOVENOX) injection 40 mg     epoprostenol (VELETRI) 20 mcg/mL in sterile water inhalation solution     fentaNYL (PF) (SUBLIMAZE) injection 50 mcg    And     fentaNYL (PF) (SUBLIMAZE) injection 50 mcg     fentaNYL (SUBLIMAZE) infusion     [Held by provider] FLUoxetine (PROzac) capsule 60 mg     glycopyrrolate (ROBINUL) injection 0.2 mg      guaiFENesin-dextromethorphan (ROBITUSSIN DM) 100-10 MG/5ML syrup 10 mL     hydrOXYzine (ATARAX) tablet 10 mg     insulin aspart (NovoLOG) injection (RAPID ACTING)     [Held by provider] ipratropium-albuterol (COMBIVENT RESPIMAT) inhaler 2 puff     lactated ringers BOLUS 1,000 mL     lactulose (CHRONULAC) solution 20 g     levothyroxine (SYNTHROID/LEVOTHROID) tablet 75 mcg     lidocaine (viscous) (XYLOCAINE) 2 % solution 5 mL     LORazepam (ATIVAN) injection 1 mg     midazolam (VERSED) injection 2 mg     multivitamins w/minerals liquid 15 mL     naloxone (NARCAN) injection 0.2 mg    Or     naloxone (NARCAN) injection 0.4 mg    Or     naloxone (NARCAN) injection 0.2 mg    Or     naloxone (NARCAN) injection 0.4 mg     norepinephrine (LEVOPHED) 4 mg in  mL infusion PREMIX     ondansetron (ZOFRAN-ODT) ODT tab 4 mg    Or     ondansetron (ZOFRAN) injection 4 mg     oxyCODONE (ROXICODONE) solution 2.5 mg     pantoprazole (PROTONIX) 2 mg/mL suspension 40 mg    Or     pantoprazole (PROTONIX) IV push injection 40 mg     prochlorperazine (COMPAZINE) injection 5 mg    Or     prochlorperazine (COMPAZINE) tablet 5 mg    Or     prochlorperazine (COMPAZINE) suppository 12.5 mg     propofol (DIPRIVAN) infusion     protein modular (PROSOURCE TF) 1 packet     [Held by provider] simvastatin (ZOCOR) tablet 40 mg     sodium chloride (PF) 0.9% PF flush 10-20 mL     sodium chloride (PF) 0.9% PF flush 10-40 mL     sodium chloride (PF) 0.9% PF flush 10-40 mL     sodium chloride (PF) 0.9% PF flush 3 mL     sodium chloride (PF) 0.9% PF flush 3 mL     ALLERGIES:  Allergies   Allergen Reactions     Sulfa Drugs      SOCIAL HISTORY:  Social History     Socioeconomic History     Marital status: Single     Spouse name: None     Number of children: None     Years of education: None     Highest education level: None   Occupational History     None   Tobacco Use     Smoking status: None     Smokeless tobacco: None   Substance and Sexual Activity  "    Alcohol use: None     Drug use: None     Sexual activity: None   Other Topics Concern     None   Social History Narrative     None     Social Determinants of Health     Financial Resource Strain: Not on file   Food Insecurity: Not on file   Transportation Needs: Not on file   Physical Activity: Not on file   Stress: Not on file   Social Connections: Not on file   Intimate Partner Violence: Not on file   Housing Stability: Not on file      PHYSICAL EXAM:  Vital Signs: /59   Pulse 108   Temp (!) 100.7  F (38.2  C) (Oral)   Resp (!) 31   Ht 1.549 m (5' 1\")   Wt 84.5 kg (186 lb 4.6 oz)   SpO2 (!) 85%   BMI 35.20 kg/m    HEENT: PERRLA. pupils 2 mm bilaterally  Cor: patient in prone position.    Pulm: Coarse BS bilaterally  ABD: soft and non-tender; no masses; Rojas in place.  EXT: warm and well perfused.     A/P: The patient is a 76-year-old woman with a history of hypertension and hypothyroidism who was admitted to the intensive care unit for acute hypoxic respiratory failure due to COVID-19 pneumonia and ARDS.  The patient's sputum culture now demonstrates 3+ gram-positive cocci.     Neuro: No acute changes.    Cardiac: Maintain ICU cardiac monitor     PULM: COVID-19 pneumonia and ARDS.  The patient's sputum culture now demonstrates 3+ gram-positive cocci.  Acute hypoxic respiratory failure due to COVID 19 pneumonia.  The patient has also had CT place yesterday for a large left pneumothorax.  Her symptoms began a week before her presentation.  The patient had fallen 2-3 days before presenting to the ED. The patient was initially prone positioning today but now is supine after requiring a left CT placement for a large left pneumothorax.     ID: Not vaccinated COVID-19 pneumonia and ARDS.  The patient's sputum culture now demonstrates 3+ gram-positive cocci. The patient appears to be in septic shock associated with a and ESBL UTI treated with a six day course of meropenem starting on 12/16; Rojas placed " 12/15. Completed 5 days of remdesivir, 10 days Dex at 6 mg, and now on Dex 12 mg started on 12/20; baricitinib started on 12/13-discontinued on 12/23. The patient was initially prone positioning today but now is supine after requiring a left CT placement for a large pneumothorax.      GI: History of recurrent emesis of gastric contents.      ENDO: Hypothyroidism on replacement with levothyroxine.     I personally examined and evaluated the patient today in the [facility] ICU. The patient remains critically ill today. All of the ICU patient care orders and treatments were placed at my direction. I personally managed the patient's ICU supportive measures that were required as the patient's critical illness creates a continuous threat for loss of life for the patient. Key critical care decisions were made by me today to maintain life-saving effective ICU supportive care. I spent 35 minutes providing critical care services at the bedside and on the critical care unit, evaluating the patient, directing care, and reviewing the patient's laboratory values and the patient's radiologic imaging reports associated with the patient's critical illness. I have evaluated all laboratory values and imaging studies from the past 24 hours.. I actively assessed this acute critically ill patient and I personally provided supportive interventions that were required because the patient has acute and persistent imminently life-threatening organ system failure. The critical care provided required high complexity decision making and clinical evaluation as the patient has an acute critical illness that impairs one or more vital organs. The patient has a high probability of imminent or life-threatening deterioration. I personally spent 35 minutes of Critical Care Time which was exclusive of any time required to perform any bedside procedures. The Critical Care Time was required to personally provide and direct critical care  services at the bedside and on the critical care unit for this acutely critically ill patient. I personally managed the patient's sedation, pain control and analgesia, metabolic abnormalities, nutritional status, and vasoactive medications.     Capo Resendiz MD, PhD

## 2021-12-28 NOTE — PROCEDURES
The patient suddenly desaturated and became hemodynamically unstable.  A chest X-ray demonstrated a large left pneumothorax.      An emergency open left 20 F chest tube was placed.   The patient was prepped and draped.  A 2 cm incision was made along the left chest wall using the #10 blade.  The chest was enter using the curved [place] clamp.  Air emerged and a 20 F chest was placed in the left chest.  The CT was secured with 0 Silk suture.  A sterile dressing was applied.  The CT was placed to 20 cm of suction.  The patient tolerated the placement of the CT and the SATs and the vital signs improved. The patient was left in the supine and the family was called to bedside.  The patient's family was called to the bedside and they understood the need to perform the emergency CT. CXR confirmed proper placement of the ET tube and proper placement the new CT with resolution of the patient left pneumatothorax.   No complication identified.     Capo Resendiz MD, PhD

## 2021-12-29 NOTE — DEATH PRONOUNCEMENT
MD DEATH PRONOUNCEMENT    Called to pronounce Sunita martinez.    Physical Exam: 2021.    Patient was pronounced dead at: 1808 2021.    Active Problems:    Acute respiratory failure with hypoxia (H)    Pneumonia due to 2019 novel coronavirus    Acute kidney failure with tubular necrosis (H)       Infectious disease present?: Yes, COVID-19 infection.      Communicable disease present? Yes, COVID-19 infection.      Multi-drug resistant organism present? Yes, COVID-19 infection.      Please consider an autopsy if any of the following exist:  No Unexpected or unexplained death during or following any dental, medical, or surgical diagnostic treatment procedures.   No Death of mother at or up to seven days after delivery.     No All  and pediatric deaths.     No Death where the cause is sufficiently obscure to delay completion of the death certificate.   No Deaths in which autopsy would confirm a suspected illness/condition that would affect surviving family members or recipients of transplanted organs.     The following deaths must be reported to the 's Office:  No A death that may be due entirely or in part to any factors other than natural disease (recent surgery, recent trauma, suspected abuse/neglect).   No A death that may be an accident, suicide, or homicide.     No Any sudden, unexpected death in which there is no prior history of significant heart disease or any other condition associated with sudden death.   No A death under suspicious, unusual, or unexpected circumstances.    No Any death which is apparently due to natural causes but in which the  does not have a personal physician familiar with the patient s medical history, social, or environmental situation or the circumstances of the terminal event.   No Any death apparently due to Sudden Infant Death Syndrome.     No Deaths that occur during, in association with, or as consequences of a diagnostic,  therapeutic, or anesthetic procedure.   No Any death in which a fracture of a major bone has occurred within the past (6) six months.   No A death of persons note seen by their physician within 120 days of demise.     No Any death in which the  was an inmate of a public institution or was in the custody of Law Enforcement personnel.   No  All unexpected deaths of children   No Solid organ donors   No Unidentified bodies   No Deaths of persons whose bodies are to be cremated or otherwise disposed of so that the bodies will later be unavailable for examination;   No Deaths unattended by a physician outside of a licensed healthcare facility or licensed residential hospice program   No Deaths occurring within 24 hours of arrival to a health care facility if death is unexpected.    No Deaths associated with the decedent s employment.   No Deaths attributed to acts of terrorism.   No Any death in which there is uncertainty as to whether it is a medical examiner s care should be discussed with the medical investigator.        Body disposition: Trevor

## 2021-12-30 NOTE — DISCHARGE SUMMARY
[facility] Discharge Summary    Date of Admission: 8 Dec. 2021    Date of Death: 2021.     Sunita Barajas MRN# [id number]   Age: 76 year old YOB: 1945     Date of Admission:  2021  Date of Discharge::  2021.  Admitting Physician:  Sarmad Hernandez MD  Discharge Physician:  Capo Resendiz MD     Procedure: Open, left, chest tube placement (open left thoracostomy tube placement) 27 Dec, 2021    Diagnoses:      COVID-19 bilateral, multifocal pneumonia and ARDS  Acute rhabdomyolysis mild, resolved   Acute Kidney Injury, resolved  Systemic hypertension  Hypothyroidism  History of depression  Dyslipidemia    Hospital Course:    Sunita Barajas is a 76 year old woman who lives in a senior apartment who was found down in her apartment was admitted on 8 Dec. 2021.to the [facility] for COVID 19 pneumonia and ARDS.  The patient had mild rhabdomyolysis and associated VALE that resolved.  The patient was unvaccinated against COVID-19.  The patient failed to respond to ICU support  care.  The patient had a past medical history for History of prior pancreatitis, stress incontinence, prior pericarditis, and cataracts. The patient failed to respond to ICU supportive care for the patients COVID-19 pneumonia and associated ARDS,  The patient had PEA arrest and was found to have developed a left pneumothorax and an emergency left open chest tube was placed on 27 Dec, 2021.  The patients responded to the chest tube placement and the pneumothorax resolved; however, the patient continued to be severely hypoxic due to the ARDS and pneumonia.  The patient's family members all stated that they knew about her advanced directive that she had written to express her wishes regarding ICU supportive care and they stated that  it would be her wish to change the goal of care to comfort measured.  The patient was made comfort measures, and the patient  on 28 Dec.  2021.  Patient was pronounced dead at: 1808 December 28, 2021.     Discharge Disposition: Tori Resendiz MD, PhD

## 2024-01-26 NOTE — PROGRESS NOTES
[facility]  Hospitalist Progress Note  Patient Name: Sunita Barajas    MRN: [id number]  Provider: Fred Eaton MD  12/15/2021             Assessment and Plan:      Summary of Stay: Sunita Barajas is a 76 year old female with history of dyslipidemia, hypertension, hypothyroidism and depression.  She has not been vaccinated for COVID-19.  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 She had been having spells of intermittent coughing spells and intermittent loose stooling at least a week prior to this presentation. She presented to the [facility] ED on 12/8/21 with worsening generalized weakness. She had had a fall event possibly 2 to 3 days prior to presentation and had been laying on her bathroom floor since.  She did not report to work so her employer contacted the family and they checked on her at home and found her on the floor.  She mentioned that she had not been eating any food for at least 2 days. ED work up showed temperature of 97.6, heart rate 98, blood pressure 143/77, respiratory 20 and oxygen saturation 95% on 10 L oxygen mask.  Labs show elevated creatinine of 1.5, , .  She tested positive for COVID-19.  CT showed pulmonary infiltrates consistent with COVID-19 pneumonia but no PE.  There was also a left ovarian cyst. She was admitted for management of COVID-19 pneumonia. She was treated with dexamethasone, remdesivir and enoxaparin. Baricitnib was added later.       Problem list:     Acute hypoxic respiratory failure secondary to COVID-19 pneumonia    - Not vaccinated    - Date of onset of symptoms: unclear maybe about 11/23/21     - Date of positive test:  12/8/21     - Continue oxygen: currently on HFNC 90% at 40 lpm; wean as tolerated: turning down to 85% today    - Continue dexamethasone (day 8 of 10)    - Completed remdesivir on 12/12    - Continue baricitinib (Day #6/14 or until discharge)    - No PE on CT chest    - Encouraged incentive  Health Maintenance Due   Topic Date Due    Shingles Vaccine (1 of 2) Never done    COVID-19 Vaccine (4 - 2023-24 season) 09/01/2023    DM/CKD Microalbumin  02/03/2024    Diabetes A1C  02/11/2024    Diabetes Foot Exam  02/17/2024    Traditional Medicare- Medicare Wellness Visit  02/17/2024       Patient is due for topics as listed above but is not proceeding with Immunization(s) COVID-19 and Shingles at this time.    "spirometry    - OOB with nursing    - patient desats with exertion. I believe this is why she ended up on BiPAP overnight. Overall she seems to be improving. Off BiPAP this am back onto HFNC.     ESBL E. Coli UTI    - Plan on 5 days of Meropenem (day 5): stop after today (12/15)     Urinary retention    - required baxter placement last night (12/15)    - >1500 out with baxter placement    Acute on chronic renal failure    - per Care Everywhere her baseline Cr is about 1.1-1.2    - Cr today is 1.16     Acute rhabdomyolysis    - received IVF    - improved     Ovarian cyst     - seen on CT    - will need outpatient ultrasound follow-up (TV and abdominal)     HTN    - not on meds at home    - BPs stable here    - previously was on lisinopril 20 mg daily- restarted here     Hypothyroidism    - not on meds    - previously was on synthroid 75mcg which was started here    - check TSH/Free T4     Depression    - not on meds     HLP    - not currently on meds    - previously on simvastatin 40mg daily- restarted here     Called and updated her son Diego      Code Status: Full Code  DVT Prophylaxis: Enoxaparin (Lovenox) SQ  Disposition: Still on high O2 levels. Likely here for several more days        Interval History:      Patient on BiPAP from overnight. Denies any new symptoms except dry mouth and being hungry. No chest pain, abdo pain, n/v/d.        Physical Exam:      Last Vital Signs:  /66 (BP Location: Right arm)   Pulse 66   Temp 97.1  F (36.2  C) (Axillary)   Resp 20   Ht 1.549 m (5' 1\")   Wt 90.7 kg (200 lb)   SpO2 90%   BMI 37.79 kg/m      Intake/Output Summary (Last 24 hours) at 12/13/2021 1502  Last data filed at 12/12/2021 1812  Gross per 24 hour   Intake 810 ml   Output --   Net 810 ml       GENERAL:  Comfortable at rest. Cooperative.  PSYCH: pleasant, oriented, No acute distress.  EYES: PERRLA, Normal conjunctiva.  HEART:  Regular rate and rhythm. No JVD. Pulses normal. No edema.  LUNGS:  Occasional " crackles in bases  ABDOMEN:  Soft, no hepatosplenomegaly, normal bowel sounds.  EXTREMETIES: No clubbing, cyanosis or ischemia  SKIN:  Dry to touch, No rash.           Medications:      All current medications were reviewed.         Data:      All new lab and imaging data was reviewed.   Labs:       Lab Results   Component Value Date     12/12/2021     12/11/2021     12/10/2021    Lab Results   Component Value Date    CHLORIDE 112 12/12/2021    CHLORIDE 109 12/11/2021    CHLORIDE 108 12/10/2021    Lab Results   Component Value Date    BUN 36 12/12/2021    BUN 40 12/11/2021    BUN 35 12/10/2021      Lab Results   Component Value Date    POTASSIUM 4.3 12/12/2021    POTASSIUM 4.0 12/11/2021    POTASSIUM 4.0 12/10/2021    Lab Results   Component Value Date    CO2 20 12/12/2021    CO2 20 12/11/2021    CO2 19 12/10/2021    Lab Results   Component Value Date    CR 1.12 12/13/2021    CR 1.24 12/12/2021    CR 1.33 12/11/2021        Recent Labs   Lab 12/12/21  0606 12/11/21  0602 12/10/21  0706   WBC 4.9 4.0 5.5   HGB 11.7 13.1 12.7   HCT 35.9 38.9 40.4   MCV 94 94 98    175 146*

## 2024-04-20 NOTE — PLAN OF CARE
"A&Ox3, disoriented to situation. VSS on 40L O2 FiO2 of 85% except HTN. Desats to 70s with ambulation. LS diminished/coarse. Infrequent, nonproductive cough. IS encouraged. Up with SBA/IV pole. +BS, last BM on 12/12/2021. Continues on IV abx for UTI. IV x 2 SL. Regular diet, tolerating. Incontinent of bladder, refusing purewick. Perineum with redness, barrier cream applied. C/o bladder pain. Given PRN Tylenol. COVID precautions maintained. Continues on decadron, inhalers, baricitinib. Nursing will continue to monitor.     BP (!) 140/65 (BP Location: Right arm)   Pulse 93   Temp (!) 96.7  F (35.9  C) (Oral)   Resp 24   Ht 1.549 m (5' 1\")   Wt 90.7 kg (200 lb)   SpO2 94%   BMI 37.79 kg/m      " Yes

## (undated) RX ORDER — GLYCOPYRROLATE 0.2 MG/ML
INJECTION INTRAMUSCULAR; INTRAVENOUS
Status: DISPENSED
Start: 2021-01-01

## (undated) RX ORDER — PROPOFOL 10 MG/ML
INJECTION, EMULSION INTRAVENOUS
Status: DISPENSED
Start: 2021-01-01

## (undated) RX ORDER — ONDANSETRON 2 MG/ML
INJECTION INTRAMUSCULAR; INTRAVENOUS
Status: DISPENSED
Start: 2021-01-01

## (undated) RX ORDER — EPHEDRINE SULFATE 50 MG/ML
INJECTION, SOLUTION INTRAMUSCULAR; INTRAVENOUS; SUBCUTANEOUS
Status: DISPENSED
Start: 2021-01-01

## (undated) RX ORDER — FENTANYL CITRATE-0.9 % NACL/PF 10 MCG/ML
PLASTIC BAG, INJECTION (ML) INTRAVENOUS
Status: DISPENSED
Start: 2021-01-01